# Patient Record
Sex: MALE | Race: WHITE | NOT HISPANIC OR LATINO | Employment: OTHER | URBAN - METROPOLITAN AREA
[De-identification: names, ages, dates, MRNs, and addresses within clinical notes are randomized per-mention and may not be internally consistent; named-entity substitution may affect disease eponyms.]

---

## 2019-06-15 ENCOUNTER — APPOINTMENT (EMERGENCY)
Dept: RADIOLOGY | Facility: HOSPITAL | Age: 74
End: 2019-06-15
Payer: OTHER GOVERNMENT

## 2019-06-15 ENCOUNTER — ANESTHESIA EVENT (OUTPATIENT)
Dept: SURGERY | Facility: HOSPITAL | Age: 74
End: 2019-06-15

## 2019-06-15 ENCOUNTER — HOSPITAL ENCOUNTER (EMERGENCY)
Dept: PERIOP | Facility: HOSPITAL | Age: 74
End: 2019-06-15
Attending: INTERNAL MEDICINE
Payer: OTHER GOVERNMENT

## 2019-06-15 ENCOUNTER — ANESTHESIA EVENT (EMERGENCY)
Dept: PERIOP | Facility: HOSPITAL | Age: 74
End: 2019-06-15
Payer: OTHER GOVERNMENT

## 2019-06-15 ENCOUNTER — HOSPITAL ENCOUNTER (EMERGENCY)
Facility: HOSPITAL | Age: 74
Discharge: HOME | End: 2019-06-15
Attending: EMERGENCY MEDICINE | Admitting: EMERGENCY MEDICINE
Payer: OTHER GOVERNMENT

## 2019-06-15 ENCOUNTER — ANESTHESIA (EMERGENCY)
Dept: PERIOP | Facility: HOSPITAL | Age: 74
End: 2019-06-15
Payer: OTHER GOVERNMENT

## 2019-06-15 ENCOUNTER — ANESTHESIA (OUTPATIENT)
Dept: SURGERY | Facility: HOSPITAL | Age: 74
End: 2019-06-15

## 2019-06-15 VITALS
SYSTOLIC BLOOD PRESSURE: 126 MMHG | HEART RATE: 80 BPM | TEMPERATURE: 98.2 F | DIASTOLIC BLOOD PRESSURE: 65 MMHG | OXYGEN SATURATION: 92 % | RESPIRATION RATE: 21 BRPM

## 2019-06-15 DIAGNOSIS — K22.2 ESOPHAGEAL OBSTRUCTION DUE TO FOOD IMPACTION: Primary | ICD-10-CM

## 2019-06-15 DIAGNOSIS — T18.128A FOOD IMPACTION OF ESOPHAGUS, INITIAL ENCOUNTER: ICD-10-CM

## 2019-06-15 DIAGNOSIS — T18.128A ESOPHAGEAL OBSTRUCTION DUE TO FOOD IMPACTION: Primary | ICD-10-CM

## 2019-06-15 LAB
ANION GAP SERPL CALCULATED.3IONS-SCNC: 7 MMOL/L (ref 4–13)
BASOPHILS # BLD AUTO: 0.03 THOUSANDS/ΜL (ref 0–0.1)
BASOPHILS NFR BLD AUTO: 0 % (ref 0–1)
BUN SERPL-MCNC: 20 MG/DL (ref 5–25)
CALCIUM SERPL-MCNC: 9.2 MG/DL (ref 8.3–10.1)
CHLORIDE SERPL-SCNC: 108 MMOL/L (ref 100–108)
CO2 SERPL-SCNC: 30 MMOL/L (ref 21–32)
CREAT SERPL-MCNC: 1.02 MG/DL (ref 0.6–1.3)
EOSINOPHIL # BLD AUTO: 0.38 THOUSAND/ΜL (ref 0–0.61)
EOSINOPHIL NFR BLD AUTO: 5 % (ref 0–6)
ERYTHROCYTE [DISTWIDTH] IN BLOOD BY AUTOMATED COUNT: 12.8 % (ref 11.6–15.1)
GFR SERPL CREATININE-BSD FRML MDRD: 71 ML/MIN/1.73SQ M
GLUCOSE SERPL-MCNC: 91 MG/DL (ref 65–140)
HCT VFR BLD AUTO: 48.2 % (ref 36.5–49.3)
HGB BLD-MCNC: 15.8 G/DL (ref 12–17)
IMM GRANULOCYTES # BLD AUTO: 0.02 THOUSAND/UL (ref 0–0.2)
IMM GRANULOCYTES NFR BLD AUTO: 0 % (ref 0–2)
LYMPHOCYTES # BLD AUTO: 2.76 THOUSANDS/ΜL (ref 0.6–4.47)
LYMPHOCYTES NFR BLD AUTO: 33 % (ref 14–44)
MCH RBC QN AUTO: 32.7 PG (ref 26.8–34.3)
MCHC RBC AUTO-ENTMCNC: 32.8 G/DL (ref 31.4–37.4)
MCV RBC AUTO: 100 FL (ref 82–98)
MONOCYTES # BLD AUTO: 0.65 THOUSAND/ΜL (ref 0.17–1.22)
MONOCYTES NFR BLD AUTO: 8 % (ref 4–12)
NEUTROPHILS # BLD AUTO: 4.43 THOUSANDS/ΜL (ref 1.85–7.62)
NEUTS SEG NFR BLD AUTO: 54 % (ref 43–75)
NRBC BLD AUTO-RTO: 0 /100 WBCS
PLATELET # BLD AUTO: 214 THOUSANDS/UL (ref 149–390)
PMV BLD AUTO: 10.2 FL (ref 8.9–12.7)
POTASSIUM SERPL-SCNC: 3.6 MMOL/L (ref 3.5–5.3)
RBC # BLD AUTO: 4.83 MILLION/UL (ref 3.88–5.62)
SODIUM SERPL-SCNC: 145 MMOL/L (ref 136–145)
WBC # BLD AUTO: 8.27 THOUSAND/UL (ref 4.31–10.16)

## 2019-06-15 PROCEDURE — 93005 ELECTROCARDIOGRAM TRACING: CPT

## 2019-06-15 PROCEDURE — C9113 INJ PANTOPRAZOLE SODIUM, VIA: HCPCS | Performed by: INTERNAL MEDICINE

## 2019-06-15 PROCEDURE — 71046 X-RAY EXAM CHEST 2 VIEWS: CPT

## 2019-06-15 PROCEDURE — 99285 EMERGENCY DEPT VISIT HI MDM: CPT

## 2019-06-15 PROCEDURE — 36415 COLL VENOUS BLD VENIPUNCTURE: CPT | Performed by: EMERGENCY MEDICINE

## 2019-06-15 PROCEDURE — 80048 BASIC METABOLIC PNL TOTAL CA: CPT | Performed by: EMERGENCY MEDICINE

## 2019-06-15 PROCEDURE — 96375 TX/PRO/DX INJ NEW DRUG ADDON: CPT

## 2019-06-15 PROCEDURE — 96361 HYDRATE IV INFUSION ADD-ON: CPT

## 2019-06-15 PROCEDURE — 99284 EMERGENCY DEPT VISIT MOD MDM: CPT | Performed by: EMERGENCY MEDICINE

## 2019-06-15 PROCEDURE — 85025 COMPLETE CBC W/AUTO DIFF WBC: CPT | Performed by: EMERGENCY MEDICINE

## 2019-06-15 PROCEDURE — 43235 EGD DIAGNOSTIC BRUSH WASH: CPT | Performed by: INTERNAL MEDICINE

## 2019-06-15 PROCEDURE — 93010 ELECTROCARDIOGRAM REPORT: CPT | Performed by: INTERNAL MEDICINE

## 2019-06-15 PROCEDURE — 96374 THER/PROPH/DIAG INJ IV PUSH: CPT

## 2019-06-15 PROCEDURE — 99243 OFF/OP CNSLTJ NEW/EST LOW 30: CPT | Performed by: INTERNAL MEDICINE

## 2019-06-15 RX ORDER — FENTANYL CITRATE/PF 50 MCG/ML
25 SYRINGE (ML) INJECTION
Status: DISCONTINUED | OUTPATIENT
Start: 2019-06-15 | End: 2019-06-15 | Stop reason: HOSPADM

## 2019-06-15 RX ORDER — PANTOPRAZOLE SODIUM 40 MG/1
40 INJECTION, POWDER, FOR SOLUTION INTRAVENOUS ONCE
Status: COMPLETED | OUTPATIENT
Start: 2019-06-15 | End: 2019-06-15

## 2019-06-15 RX ORDER — LABETALOL 20 MG/4 ML (5 MG/ML) INTRAVENOUS SYRINGE
5 AS NEEDED
Status: DISCONTINUED | OUTPATIENT
Start: 2019-06-15 | End: 2019-06-15 | Stop reason: HOSPADM

## 2019-06-15 RX ORDER — OMEPRAZOLE 20 MG/1
20 CAPSULE, DELAYED RELEASE ORAL DAILY
COMMUNITY

## 2019-06-15 RX ORDER — PROMETHAZINE HYDROCHLORIDE 25 MG/ML
6.25 INJECTION, SOLUTION INTRAMUSCULAR; INTRAVENOUS ONCE AS NEEDED
Status: DISCONTINUED | OUTPATIENT
Start: 2019-06-15 | End: 2019-06-15 | Stop reason: HOSPADM

## 2019-06-15 RX ORDER — METOCLOPRAMIDE HYDROCHLORIDE 5 MG/ML
10 INJECTION INTRAMUSCULAR; INTRAVENOUS ONCE AS NEEDED
Status: DISCONTINUED | OUTPATIENT
Start: 2019-06-15 | End: 2019-06-15 | Stop reason: HOSPADM

## 2019-06-15 RX ORDER — MEPERIDINE HYDROCHLORIDE 25 MG/ML
12.5 INJECTION INTRAMUSCULAR; INTRAVENOUS; SUBCUTANEOUS
Status: DISCONTINUED | OUTPATIENT
Start: 2019-06-15 | End: 2019-06-15 | Stop reason: HOSPADM

## 2019-06-15 RX ORDER — ONDANSETRON 2 MG/ML
INJECTION INTRAMUSCULAR; INTRAVENOUS AS NEEDED
Status: DISCONTINUED | OUTPATIENT
Start: 2019-06-15 | End: 2019-06-15 | Stop reason: SURG

## 2019-06-15 RX ORDER — HYDRALAZINE HYDROCHLORIDE 20 MG/ML
5 INJECTION INTRAMUSCULAR; INTRAVENOUS AS NEEDED
Status: DISCONTINUED | OUTPATIENT
Start: 2019-06-15 | End: 2019-06-15 | Stop reason: HOSPADM

## 2019-06-15 RX ORDER — ASPIRIN 81 MG/1
81 TABLET ORAL DAILY
COMMUNITY
End: 2021-02-11 | Stop reason: HOSPADM

## 2019-06-15 RX ORDER — SODIUM CHLORIDE, SODIUM LACTATE, POTASSIUM CHLORIDE, CALCIUM CHLORIDE 600; 310; 30; 20 MG/100ML; MG/100ML; MG/100ML; MG/100ML
INJECTION, SOLUTION INTRAVENOUS CONTINUOUS PRN
Status: DISCONTINUED | OUTPATIENT
Start: 2019-06-15 | End: 2019-06-15 | Stop reason: SURG

## 2019-06-15 RX ORDER — PROPOFOL 10 MG/ML
INJECTION, EMULSION INTRAVENOUS AS NEEDED
Status: DISCONTINUED | OUTPATIENT
Start: 2019-06-15 | End: 2019-06-15 | Stop reason: SURG

## 2019-06-15 RX ORDER — ESMOLOL HYDROCHLORIDE 10 MG/ML
INJECTION INTRAVENOUS AS NEEDED
Status: DISCONTINUED | OUTPATIENT
Start: 2019-06-15 | End: 2019-06-15 | Stop reason: SURG

## 2019-06-15 RX ORDER — FENTANYL CITRATE 50 UG/ML
INJECTION, SOLUTION INTRAMUSCULAR; INTRAVENOUS AS NEEDED
Status: DISCONTINUED | OUTPATIENT
Start: 2019-06-15 | End: 2019-06-15 | Stop reason: SURG

## 2019-06-15 RX ORDER — ROCURONIUM BROMIDE 10 MG/ML
INJECTION, SOLUTION INTRAVENOUS AS NEEDED
Status: DISCONTINUED | OUTPATIENT
Start: 2019-06-15 | End: 2019-06-15 | Stop reason: SURG

## 2019-06-15 RX ORDER — SUCCINYLCHOLINE/SOD CL,ISO/PF 100 MG/5ML
SYRINGE (ML) INTRAVENOUS AS NEEDED
Status: DISCONTINUED | OUTPATIENT
Start: 2019-06-15 | End: 2019-06-15 | Stop reason: SURG

## 2019-06-15 RX ORDER — ONDANSETRON 2 MG/ML
4 INJECTION INTRAMUSCULAR; INTRAVENOUS ONCE AS NEEDED
Status: DISCONTINUED | OUTPATIENT
Start: 2019-06-15 | End: 2019-06-15 | Stop reason: HOSPADM

## 2019-06-15 RX ADMIN — PROPOFOL 30 MG: 10 INJECTION, EMULSION INTRAVENOUS at 15:06

## 2019-06-15 RX ADMIN — PANTOPRAZOLE SODIUM 40 MG: 40 INJECTION, POWDER, FOR SOLUTION INTRAVENOUS at 13:34

## 2019-06-15 RX ADMIN — SODIUM CHLORIDE, SODIUM LACTATE, POTASSIUM CHLORIDE, AND CALCIUM CHLORIDE: .6; .31; .03; .02 INJECTION, SOLUTION INTRAVENOUS at 14:30

## 2019-06-15 RX ADMIN — ONDANSETRON 4 MG: 2 INJECTION INTRAMUSCULAR; INTRAVENOUS at 15:07

## 2019-06-15 RX ADMIN — ROCURONIUM BROMIDE 5 MG: 10 INJECTION, SOLUTION INTRAVENOUS at 14:52

## 2019-06-15 RX ADMIN — GLUCAGON HYDROCHLORIDE 2 MG: KIT at 08:42

## 2019-06-15 RX ADMIN — FENTANYL CITRATE 50 MCG: 50 INJECTION, SOLUTION INTRAMUSCULAR; INTRAVENOUS at 15:03

## 2019-06-15 RX ADMIN — ESMOLOL HYDROCHLORIDE 30 MG: 10 INJECTION, SOLUTION INTRAVENOUS at 14:57

## 2019-06-15 RX ADMIN — SODIUM CHLORIDE 1000 ML: 0.9 INJECTION, SOLUTION INTRAVENOUS at 08:42

## 2019-06-15 RX ADMIN — Medication 100 MG: at 14:57

## 2019-06-15 RX ADMIN — PROPOFOL 150 MG: 10 INJECTION, EMULSION INTRAVENOUS at 14:56

## 2019-06-17 LAB
ATRIAL RATE: 72 BPM
P AXIS: 62 DEGREES
PR INTERVAL: 172 MS
QRS AXIS: 71 DEGREES
QRSD INTERVAL: 150 MS
QT INTERVAL: 472 MS
QTC INTERVAL: 516 MS
T WAVE AXIS: 14 DEGREES
VENTRICULAR RATE: 72 BPM

## 2021-02-05 ENCOUNTER — APPOINTMENT (INPATIENT)
Dept: ULTRASOUND IMAGING | Facility: HOSPITAL | Age: 76
DRG: 854 | End: 2021-02-05
Payer: COMMERCIAL

## 2021-02-05 ENCOUNTER — APPOINTMENT (EMERGENCY)
Dept: CT IMAGING | Facility: HOSPITAL | Age: 76
DRG: 854 | End: 2021-02-05
Payer: COMMERCIAL

## 2021-02-05 ENCOUNTER — HOSPITAL ENCOUNTER (INPATIENT)
Facility: HOSPITAL | Age: 76
LOS: 6 days | Discharge: HOME | DRG: 854 | End: 2021-02-11
Attending: EMERGENCY MEDICINE | Admitting: INTERNAL MEDICINE
Payer: COMMERCIAL

## 2021-02-05 ENCOUNTER — APPOINTMENT (EMERGENCY)
Dept: RADIOLOGY | Facility: HOSPITAL | Age: 76
DRG: 854 | End: 2021-02-05
Payer: COMMERCIAL

## 2021-02-05 DIAGNOSIS — R10.9 ABDOMINAL PAIN: Primary | ICD-10-CM

## 2021-02-05 DIAGNOSIS — K82.9 GALLBLADDER DISEASE: ICD-10-CM

## 2021-02-05 DIAGNOSIS — N40.0 ENLARGED PROSTATE: ICD-10-CM

## 2021-02-05 DIAGNOSIS — R65.10 SIRS (SYSTEMIC INFLAMMATORY RESPONSE SYNDROME) (HCC): ICD-10-CM

## 2021-02-05 DIAGNOSIS — R10.11 RIGHT UPPER QUADRANT ABDOMINAL PAIN: ICD-10-CM

## 2021-02-05 DIAGNOSIS — I48.0 PAF (PAROXYSMAL ATRIAL FIBRILLATION) (HCC): ICD-10-CM

## 2021-02-05 DIAGNOSIS — R78.81 GRAM-NEGATIVE BACTEREMIA: ICD-10-CM

## 2021-02-05 DIAGNOSIS — I48.91 NEW ONSET ATRIAL FIBRILLATION (HCC): ICD-10-CM

## 2021-02-05 DIAGNOSIS — I10 HYPERTENSION, UNSPECIFIED TYPE: Chronic | ICD-10-CM

## 2021-02-05 DIAGNOSIS — R59.0 ABDOMINAL LYMPHADENOPATHY: ICD-10-CM

## 2021-02-05 LAB
ALBUMIN SERPL BCP-MCNC: 3.6 G/DL (ref 3.5–5)
ALP SERPL-CCNC: 74 U/L (ref 46–116)
ALT SERPL W P-5'-P-CCNC: 22 U/L (ref 12–78)
ANION GAP SERPL CALCULATED.3IONS-SCNC: 11 MMOL/L (ref 4–13)
APTT PPP: 36 SECONDS (ref 23–37)
APTT PPP: >210 SECONDS (ref 23–37)
AST SERPL W P-5'-P-CCNC: 19 U/L (ref 5–45)
ATRIAL RATE: 123 BPM
ATRIAL RATE: 144 BPM
ATRIAL RATE: 75 BPM
BACTERIA UR QL AUTO: NORMAL /HPF
BASOPHILS # BLD AUTO: 0.04 THOUSANDS/ΜL (ref 0–0.1)
BASOPHILS NFR BLD AUTO: 0 % (ref 0–1)
BILIRUB SERPL-MCNC: 2.9 MG/DL (ref 0.2–1)
BILIRUB UR QL STRIP: NEGATIVE
BUN SERPL-MCNC: 23 MG/DL (ref 5–25)
CALCIUM SERPL-MCNC: 8.9 MG/DL (ref 8.3–10.1)
CHLORIDE SERPL-SCNC: 96 MMOL/L (ref 100–108)
CLARITY UR: CLEAR
CO2 SERPL-SCNC: 25 MMOL/L (ref 21–32)
COLOR UR: YELLOW
CREAT SERPL-MCNC: 1.13 MG/DL (ref 0.6–1.3)
CRP SERPL QL: 229.8 MG/L
D DIMER PPP FEU-MCNC: 2.4 UG/ML FEU
EOSINOPHIL # BLD AUTO: 0.04 THOUSAND/ΜL (ref 0–0.61)
EOSINOPHIL NFR BLD AUTO: 0 % (ref 0–6)
ERYTHROCYTE [DISTWIDTH] IN BLOOD BY AUTOMATED COUNT: 13 % (ref 11.6–15.1)
FERRITIN SERPL-MCNC: 217 NG/ML (ref 8–388)
FLUAV RNA RESP QL NAA+PROBE: NEGATIVE
FLUBV RNA RESP QL NAA+PROBE: NEGATIVE
GFR SERPL CREATININE-BSD FRML MDRD: 62 ML/MIN/1.73SQ M
GLUCOSE SERPL-MCNC: 136 MG/DL (ref 65–140)
GLUCOSE UR STRIP-MCNC: NEGATIVE MG/DL
HCT VFR BLD AUTO: 49.2 % (ref 36.5–49.3)
HGB BLD-MCNC: 16.1 G/DL (ref 12–17)
HGB UR QL STRIP.AUTO: ABNORMAL
IMM GRANULOCYTES # BLD AUTO: 0.05 THOUSAND/UL (ref 0–0.2)
IMM GRANULOCYTES NFR BLD AUTO: 0 % (ref 0–2)
INR PPP: 1.19 (ref 0.84–1.19)
KETONES UR STRIP-MCNC: ABNORMAL MG/DL
LACTATE SERPL-SCNC: 1.6 MMOL/L (ref 0.5–2)
LEUKOCYTE ESTERASE UR QL STRIP: NEGATIVE
LYMPHOCYTES # BLD AUTO: 1.82 THOUSANDS/ΜL (ref 0.6–4.47)
LYMPHOCYTES NFR BLD AUTO: 13 % (ref 14–44)
MCH RBC QN AUTO: 32 PG (ref 26.8–34.3)
MCHC RBC AUTO-ENTMCNC: 32.7 G/DL (ref 31.4–37.4)
MCV RBC AUTO: 98 FL (ref 82–98)
MONOCYTES # BLD AUTO: 0.83 THOUSAND/ΜL (ref 0.17–1.22)
MONOCYTES NFR BLD AUTO: 6 % (ref 4–12)
NEUTROPHILS # BLD AUTO: 11.22 THOUSANDS/ΜL (ref 1.85–7.62)
NEUTS SEG NFR BLD AUTO: 81 % (ref 43–75)
NITRITE UR QL STRIP: NEGATIVE
NON-SQ EPI CELLS URNS QL MICRO: NORMAL /HPF
NRBC BLD AUTO-RTO: 0 /100 WBCS
NT-PROBNP SERPL-MCNC: 1311 PG/ML
P AXIS: 75 DEGREES
PH UR STRIP.AUTO: 5.5 [PH]
PLATELET # BLD AUTO: 186 THOUSANDS/UL (ref 149–390)
PMV BLD AUTO: 10.8 FL (ref 8.9–12.7)
POTASSIUM SERPL-SCNC: 3.7 MMOL/L (ref 3.5–5.3)
PR INTERVAL: 162 MS
PROCALCITONIN SERPL-MCNC: 2.33 NG/ML
PROT SERPL-MCNC: 7.8 G/DL (ref 6.4–8.2)
PROT UR STRIP-MCNC: NEGATIVE MG/DL
PROTHROMBIN TIME: 15.1 SECONDS (ref 11.6–14.5)
QRS AXIS: 116 DEGREES
QRS AXIS: 133 DEGREES
QRS AXIS: 136 DEGREES
QRSD INTERVAL: 144 MS
QRSD INTERVAL: 146 MS
QRSD INTERVAL: 148 MS
QT INTERVAL: 316 MS
QT INTERVAL: 336 MS
QT INTERVAL: 346 MS
QTC INTERVAL: 495 MS
QTC INTERVAL: 499 MS
QTC INTERVAL: 534 MS
RBC # BLD AUTO: 5.03 MILLION/UL (ref 3.88–5.62)
RBC #/AREA URNS AUTO: NORMAL /HPF
RSV RNA RESP QL NAA+PROBE: NEGATIVE
SARS-COV-2 RNA RESP QL NAA+PROBE: NEGATIVE
SODIUM SERPL-SCNC: 132 MMOL/L (ref 136–145)
SP GR UR STRIP.AUTO: <=1.005 (ref 1–1.03)
T WAVE AXIS: -10 DEGREES
T WAVE AXIS: -7 DEGREES
T WAVE AXIS: 24 DEGREES
TROPONIN I SERPL-MCNC: <0.02 NG/ML
TSH SERPL DL<=0.05 MIU/L-ACNC: 2.99 UIU/ML (ref 0.36–3.74)
UROBILINOGEN UR QL STRIP.AUTO: 0.2 E.U./DL
VENTRICULAR RATE: 123 BPM
VENTRICULAR RATE: 150 BPM
VENTRICULAR RATE: 152 BPM
WBC # BLD AUTO: 14 THOUSAND/UL (ref 4.31–10.16)
WBC #/AREA URNS AUTO: NORMAL /HPF

## 2021-02-05 PROCEDURE — 85025 COMPLETE CBC W/AUTO DIFF WBC: CPT | Performed by: PHYSICIAN ASSISTANT

## 2021-02-05 PROCEDURE — 76705 ECHO EXAM OF ABDOMEN: CPT

## 2021-02-05 PROCEDURE — 99223 1ST HOSP IP/OBS HIGH 75: CPT | Performed by: INTERNAL MEDICINE

## 2021-02-05 PROCEDURE — 84443 ASSAY THYROID STIM HORMONE: CPT | Performed by: NURSE PRACTITIONER

## 2021-02-05 PROCEDURE — 85379 FIBRIN DEGRADATION QUANT: CPT | Performed by: PHYSICIAN ASSISTANT

## 2021-02-05 PROCEDURE — 87077 CULTURE AEROBIC IDENTIFY: CPT | Performed by: PHYSICIAN ASSISTANT

## 2021-02-05 PROCEDURE — 94760 N-INVAS EAR/PLS OXIMETRY 1: CPT

## 2021-02-05 PROCEDURE — 83605 ASSAY OF LACTIC ACID: CPT | Performed by: PHYSICIAN ASSISTANT

## 2021-02-05 PROCEDURE — 71045 X-RAY EXAM CHEST 1 VIEW: CPT

## 2021-02-05 PROCEDURE — 0241U HB NFCT DS VIR RESP RNA 4 TRGT: CPT | Performed by: PHYSICIAN ASSISTANT

## 2021-02-05 PROCEDURE — 94664 DEMO&/EVAL PT USE INHALER: CPT

## 2021-02-05 PROCEDURE — 99285 EMERGENCY DEPT VISIT HI MDM: CPT | Performed by: PHYSICIAN ASSISTANT

## 2021-02-05 PROCEDURE — 85610 PROTHROMBIN TIME: CPT | Performed by: PHYSICIAN ASSISTANT

## 2021-02-05 PROCEDURE — 96361 HYDRATE IV INFUSION ADD-ON: CPT

## 2021-02-05 PROCEDURE — 87186 SC STD MICRODIL/AGAR DIL: CPT | Performed by: PHYSICIAN ASSISTANT

## 2021-02-05 PROCEDURE — 83880 ASSAY OF NATRIURETIC PEPTIDE: CPT | Performed by: PHYSICIAN ASSISTANT

## 2021-02-05 PROCEDURE — 84145 PROCALCITONIN (PCT): CPT | Performed by: PHYSICIAN ASSISTANT

## 2021-02-05 PROCEDURE — 96365 THER/PROPH/DIAG IV INF INIT: CPT

## 2021-02-05 PROCEDURE — 74177 CT ABD & PELVIS W/CONTRAST: CPT

## 2021-02-05 PROCEDURE — 93010 ELECTROCARDIOGRAM REPORT: CPT | Performed by: INTERNAL MEDICINE

## 2021-02-05 PROCEDURE — 81001 URINALYSIS AUTO W/SCOPE: CPT | Performed by: NURSE PRACTITIONER

## 2021-02-05 PROCEDURE — 99223 1ST HOSP IP/OBS HIGH 75: CPT | Performed by: NURSE PRACTITIONER

## 2021-02-05 PROCEDURE — 94640 AIRWAY INHALATION TREATMENT: CPT

## 2021-02-05 PROCEDURE — 87040 BLOOD CULTURE FOR BACTERIA: CPT | Performed by: PHYSICIAN ASSISTANT

## 2021-02-05 PROCEDURE — G1004 CDSM NDSC: HCPCS

## 2021-02-05 PROCEDURE — 36415 COLL VENOUS BLD VENIPUNCTURE: CPT | Performed by: PHYSICIAN ASSISTANT

## 2021-02-05 PROCEDURE — 85730 THROMBOPLASTIN TIME PARTIAL: CPT | Performed by: INTERNAL MEDICINE

## 2021-02-05 PROCEDURE — 85730 THROMBOPLASTIN TIME PARTIAL: CPT | Performed by: PHYSICIAN ASSISTANT

## 2021-02-05 PROCEDURE — 82728 ASSAY OF FERRITIN: CPT | Performed by: PHYSICIAN ASSISTANT

## 2021-02-05 PROCEDURE — 96375 TX/PRO/DX INJ NEW DRUG ADDON: CPT

## 2021-02-05 PROCEDURE — 99285 EMERGENCY DEPT VISIT HI MDM: CPT

## 2021-02-05 PROCEDURE — 86140 C-REACTIVE PROTEIN: CPT | Performed by: PHYSICIAN ASSISTANT

## 2021-02-05 PROCEDURE — 93005 ELECTROCARDIOGRAM TRACING: CPT

## 2021-02-05 PROCEDURE — 84484 ASSAY OF TROPONIN QUANT: CPT | Performed by: PHYSICIAN ASSISTANT

## 2021-02-05 PROCEDURE — 80053 COMPREHEN METABOLIC PANEL: CPT | Performed by: PHYSICIAN ASSISTANT

## 2021-02-05 RX ORDER — DILTIAZEM HYDROCHLORIDE 5 MG/ML
15 INJECTION INTRAVENOUS ONCE
Status: COMPLETED | OUTPATIENT
Start: 2021-02-05 | End: 2021-02-05

## 2021-02-05 RX ORDER — ACETAMINOPHEN 325 MG/1
650 TABLET ORAL ONCE
Status: COMPLETED | OUTPATIENT
Start: 2021-02-05 | End: 2021-02-05

## 2021-02-05 RX ORDER — IPRATROPIUM BROMIDE AND ALBUTEROL SULFATE 2.5; .5 MG/3ML; MG/3ML
3 SOLUTION RESPIRATORY (INHALATION) EVERY 6 HOURS PRN
Status: DISCONTINUED | OUTPATIENT
Start: 2021-02-05 | End: 2021-02-05

## 2021-02-05 RX ORDER — HEPARIN SODIUM 1000 [USP'U]/ML
3600 INJECTION, SOLUTION INTRAVENOUS; SUBCUTANEOUS
Status: ACTIVE | OUTPATIENT
Start: 2021-02-05 | End: 2021-02-07

## 2021-02-05 RX ORDER — SODIUM CHLORIDE FOR INHALATION 0.9 %
3 VIAL, NEBULIZER (ML) INHALATION EVERY 6 HOURS PRN
Status: DISCONTINUED | OUTPATIENT
Start: 2021-02-05 | End: 2021-02-11 | Stop reason: HOSPADM

## 2021-02-05 RX ORDER — METOPROLOL TARTRATE 5 MG/5ML
5 INJECTION INTRAVENOUS EVERY 6 HOURS PRN
Status: DISCONTINUED | OUTPATIENT
Start: 2021-02-05 | End: 2021-02-11 | Stop reason: HOSPADM

## 2021-02-05 RX ORDER — HEPARIN SODIUM 10000 [USP'U]/100ML
18 INJECTION, SOLUTION INTRAVENOUS
Status: DISPENSED | OUTPATIENT
Start: 2021-02-05 | End: 2021-02-07

## 2021-02-05 RX ORDER — HEPARIN SODIUM 1000 [USP'U]/ML
7200 INJECTION, SOLUTION INTRAVENOUS; SUBCUTANEOUS
Status: ACTIVE | OUTPATIENT
Start: 2021-02-05 | End: 2021-02-07

## 2021-02-05 RX ORDER — HEPARIN SODIUM 1000 [USP'U]/ML
7200 INJECTION, SOLUTION INTRAVENOUS; SUBCUTANEOUS ONCE
Status: COMPLETED | OUTPATIENT
Start: 2021-02-05 | End: 2021-02-05

## 2021-02-05 RX ORDER — ACETAMINOPHEN 325 MG/1
650 TABLET ORAL EVERY 6 HOURS PRN
Status: DISCONTINUED | OUTPATIENT
Start: 2021-02-05 | End: 2021-02-11 | Stop reason: HOSPADM

## 2021-02-05 RX ORDER — METOPROLOL TARTRATE 5 MG/5ML
5 INJECTION INTRAVENOUS ONCE
Status: COMPLETED | OUTPATIENT
Start: 2021-02-05 | End: 2021-02-05

## 2021-02-05 RX ORDER — LEVALBUTEROL 1.25 MG/.5ML
1.25 SOLUTION, CONCENTRATE RESPIRATORY (INHALATION) EVERY 6 HOURS PRN
Status: DISCONTINUED | OUTPATIENT
Start: 2021-02-05 | End: 2021-02-11 | Stop reason: HOSPADM

## 2021-02-05 RX ORDER — SODIUM CHLORIDE, SODIUM GLUCONATE, SODIUM ACETATE, POTASSIUM CHLORIDE, MAGNESIUM CHLORIDE, SODIUM PHOSPHATE, DIBASIC, AND POTASSIUM PHOSPHATE .53; .5; .37; .037; .03; .012; .00082 G/100ML; G/100ML; G/100ML; G/100ML; G/100ML; G/100ML; G/100ML
500 INJECTION, SOLUTION INTRAVENOUS ONCE
Status: COMPLETED | OUTPATIENT
Start: 2021-02-05 | End: 2021-02-05

## 2021-02-05 RX ORDER — MAGNESIUM SULFATE HEPTAHYDRATE 40 MG/ML
2 INJECTION, SOLUTION INTRAVENOUS ONCE
Status: COMPLETED | OUTPATIENT
Start: 2021-02-05 | End: 2021-02-05

## 2021-02-05 RX ADMIN — PIPERACILLIN AND TAZOBACTAM 3.38 G: 3; .375 INJECTION, POWDER, LYOPHILIZED, FOR SOLUTION INTRAVENOUS at 20:47

## 2021-02-05 RX ADMIN — SODIUM CHLORIDE 1000 ML: 0.9 INJECTION, SOLUTION INTRAVENOUS at 14:27

## 2021-02-05 RX ADMIN — HEPARIN SODIUM 18 UNITS/KG/HR: 10000 INJECTION, SOLUTION INTRAVENOUS at 16:26

## 2021-02-05 RX ADMIN — DILTIAZEM HYDROCHLORIDE 15 MG: 5 INJECTION INTRAVENOUS at 14:19

## 2021-02-05 RX ADMIN — IOHEXOL 100 ML: 350 INJECTION, SOLUTION INTRAVENOUS at 13:54

## 2021-02-05 RX ADMIN — SODIUM CHLORIDE 500 ML: 0.9 INJECTION, SOLUTION INTRAVENOUS at 13:02

## 2021-02-05 RX ADMIN — LEVALBUTEROL HYDROCHLORIDE 1.25 MG: 1.25 SOLUTION, CONCENTRATE RESPIRATORY (INHALATION) at 17:27

## 2021-02-05 RX ADMIN — METOROPROLOL TARTRATE 5 MG: 5 INJECTION, SOLUTION INTRAVENOUS at 17:28

## 2021-02-05 RX ADMIN — SODIUM CHLORIDE, SODIUM GLUCONATE, SODIUM ACETATE, POTASSIUM CHLORIDE, MAGNESIUM CHLORIDE, SODIUM PHOSPHATE, DIBASIC, AND POTASSIUM PHOSPHATE 500 ML: .53; .5; .37; .037; .03; .012; .00082 INJECTION, SOLUTION INTRAVENOUS at 21:29

## 2021-02-05 RX ADMIN — ISODIUM CHLORIDE 3 ML: 0.03 SOLUTION RESPIRATORY (INHALATION) at 17:27

## 2021-02-05 RX ADMIN — DILTIAZEM HYDROCHLORIDE 15 MG/HR: 5 INJECTION INTRAVENOUS at 18:38

## 2021-02-05 RX ADMIN — HEPARIN SODIUM 7200 UNITS: 1000 INJECTION INTRAVENOUS; SUBCUTANEOUS at 16:31

## 2021-02-05 RX ADMIN — DILTIAZEM HYDROCHLORIDE 5 MG/HR: 5 INJECTION INTRAVENOUS at 15:15

## 2021-02-05 RX ADMIN — DILTIAZEM HYDROCHLORIDE 15 MG: 5 INJECTION INTRAVENOUS at 16:14

## 2021-02-05 RX ADMIN — ACETAMINOPHEN 650 MG: 325 TABLET, FILM COATED ORAL at 17:20

## 2021-02-05 RX ADMIN — ACETAMINOPHEN 650 MG: 325 TABLET, FILM COATED ORAL at 12:24

## 2021-02-05 RX ADMIN — PIPERACILLIN AND TAZOBACTAM 3.38 G: 3; .375 INJECTION, POWDER, LYOPHILIZED, FOR SOLUTION INTRAVENOUS at 14:32

## 2021-02-05 RX ADMIN — MAGNESIUM SULFATE HEPTAHYDRATE 2 G: 40 INJECTION, SOLUTION INTRAVENOUS at 16:12

## 2021-02-05 NOTE — UTILIZATION REVIEW
Notification of Inpatient Admission/Inpatient Authorization Request   This is a Notification of Inpatient Admission for [address]  Be advised that this patient was admitted to our facility under Inpatient Status  Contact Samm Hernandez at [phone] for additional admission information  [facility] DEPT DEDICATED [place] [phone]  Patient Name:   Kristie Hayden   YOB: 1945       [address]   [address]:   [address]   Tax ID: [id number]  NPI: [id number] Attending Provider/NPI:  Phone:  Address: Aurea Gant [[id number]]  [phone]  Same as SHAYY/Karen Mehta 1106 of Service Code: 24     Place of Service Name:  [address] [place] [address]   Start Date: 2/5/21 1504 Discharge Date & Time: No discharge date for patient encounter  Type of Admission: Inpatient Status Discharge Disposition   (if discharged): Home/Self Care   Patient Diagnoses: SOB (shortness of breath) [R06 02]     Orders: Admission Orders (From admission, onward)     Ordered        02/05/21 1504  Inpatient Admission  Once                    Assigned Utilization Review Contact: Samm Hernandez  Utilization   Network Utilization Review Department  Phone: [phone]; Fax [phone]  Email: Brandon Krabbe [email or web address]   ATTENTION PAYERS: Please call the assigned Utilization  directly with any questions or concerns ALL voicemails in the department are confidential  Send all requests for admission clinical reviews, approved or denied determinations and any other requests to dedicated fax number belonging to the campus where the patient is receiving treatment

## 2021-02-05 NOTE — CONSULTS
Consultation - Cardiology Team One  Tommy John 66 y o  male MRN: [id number]  Unit/Bed#: ED 02 Encounter: [id number]    Consults    Physician Requesting Consult: Colt Frye MD  Reason for Consult / Principal Problem: afib with RVR    Assessment:    Afib with RVR  · Patient comes to ED with c/o upper abdominal/epigastric pain and chills  · EKG on admit by my review appears to be sinus tachycardia with frequent PVC's  · Around 1356 patient noted to go into rapid afib with rates in the 160's  · Patient is asymptomatic and denies any palpitations/SOB/chest discomfort  · IV Cardizem 15 mg bolus x 1 given in ED with minimal decrease in rate  · IV Cardizem drip started at 5mg/hr with no response, now up-titrated to 7 5 mg/hr  · Will give additional IV Cardizem 15 mg bolus now and continue to up-titrate Cardizem drip with goal rate 100 or less at rest  · Presumed new diagnosis for patient; patient is unaware of his past medical history  · K+ 3 7, mag and TSH not drawn; will order  · Will given 2 gm IV mag now   · [id number] score of 4 (based on what we know at this time); will start heparin drip for anticoagulation given findings of possible cholecystitis on CT of abdomen  · If no surgical intervention is needed then can start Eliquis 5 mg BID for [address] and d/c heparin drip at that time  ·  at this time would attempt to continue IV Cardizem drip for rate control and likely start PO CCB tomorrow based on response to IV drip  · Patient meets SIRS criteria on admit with evidence of distended gallbladder; consider possible infectious vs  Inflammatory etiology driving afib with RVR    SIRS  · Meets SIRS criteria on admit with fever 100 6, sinus tachycardia and leukocytosis  · CT scan showed distended gallbladder - right upper quadrant ultrasound pending  · Started on Zosyn in the ED  · Blood cultures pending  · Management per primary team  · mireyasathya driving afib with RVR    Hx of CAD  ·  patient states he had stents placed in 2018 in [place]  ·  he does not remember what vessel/s  ·  patient is unaware of past medical history; poor historian  ·  he states he takes 81 mg of aspirin daily  ·  he states he follows with a cardiologist within the [facility]    Essential HTN  ·  currently hemodynamically stable  ·  patient thinks that he takes metoprolol tartrate at home but he is unsure of dose  ·  would hold off on all antihypertensive medications at this time and continue IV Cardizem drip for rate control of Afib    Right upper quadrant pain  · Patient notes RUQ pain that began Wednesday   · Also notes fever/chills and fatigue today  · CT scan showed distended gallbladder - right upper quadrant ultrasound pending        Plan/Recommendations:  · will give additional IV Cardizem bolus 15 mg now  ·  continue IV Cardizem drip and up titrate as needed for rate control  ·  would begin heparin drip for anticoagulation at this time; once it is determined that surgical intervention is not necessary for gallbladder then can start Eliquis 5 mg b i d  for [address] and would DC heparin drip at that time  ·  will order echo        __________________________________________________________________________      History of Present Illness   HPI: Mitchell Crump is a 66y o  year old male with a PMH of CAD and HTN  The patient is a poor historian largely unaware of his past medical history and states that he usually sees the [facility] of which we do not have records  Patient states that he began to notice upper abdominal/epigastric pain on Wednesday afternoon into the evening states he took Tylenol without relief  He denies any type of nausea or vomiting at that time but states that he was fatigued  He states that he went to sleep woke up yesterday overall feeling pretty well yesterday  He states that he was able to help his significant other clean the house today and visit with her daughter    However, the patient states that when his significant other's daughter left today in the morning he began to feel extreme chills noted a fever excessive fatigue and continued abdominal pain  The patient states this prompted him coming to the ED for evaluation  At time of admission to the ED the patient was noted to be in sinus tachycardia with frequent PVCs  He was also normotensive  The patient tells me that the only cardiac history he is aware of is that he had stents placed somewhere in [place] in 2018  He states that he follows with a cardiologist within the [facility] in [place] but he does not know the doctor's name and states that he saw him early last year " and I was told everything was fine"  The patient states that he knows he takes high blood pressure medication and he takes 181 mg aspirin daily  The patient is unable to tell me if he has any other cardiac history other than stent placement  Cardiology is asked to evaluate the patient secondary to the fact that around [phone] the patient was noted to go in to Afib with RVR with rates in the 160's  IV Cardizem bolus 15 mg given once in the ED and drip started  The patient is largely asymptomatic stating that he has no complaints of shortness of breath at rest, palpitations, chest discomfort or orthopnea  Please see plan/ recommendation as listed above  EKG reviewed personally: afib with RVR        Telemetry reviewed personally: afib with RVR rates in the 160's      Review of Systems   Constitution: Positive for chills, fever and malaise/fatigue  HENT: Negative for congestion  Cardiovascular: Negative for chest pain, dyspnea on exertion, leg swelling, orthopnea and palpitations  Respiratory: Negative for cough, shortness of breath (no SOB at rest) and wheezing  Endocrine: Negative  Hematologic/Lymphatic: Negative  Skin: Negative  Musculoskeletal: Negative  Gastrointestinal: Positive for bloating and abdominal pain   Negative for nausea and vomiting  Genitourinary: Negative  Neurological: Negative for dizziness and light-headedness  Psychiatric/Behavioral: Negative  All other systems reviewed and are negative  Historical Information   Past Medical History:   Diagnosis Date    Dysphagia     Hypertension      Past Surgical History:   Procedure Laterality Date    CORONARY STENT PLACEMENT       Social History     Substance and Sexual Activity   Alcohol Use Yes    Comment: occasionally     Social History     Substance and Sexual Activity   Drug Use Never     Social History     Tobacco Use   Smoking Status Never Smoker   Smokeless Tobacco Never Used     Family History: History reviewed  No pertinent family history  Meds/Allergies   current meds:   Current Facility-Administered Medications   Medication Dose Route Frequency    diltiazem (CARDIZEM) injection 15 mg  15 mg Intravenous Once    enoxaparin (LOVENOX) subcutaneous injection 30 mg  30 mg Subcutaneous Daily    magnesium sulfate 2 g/50 mL IVPB (premix) 2 g  2 g Intravenous Once    piperacillin-tazobactam (ZOSYN) 3 375 g in sodium chloride 0 9 % 100 mL IVPB  3 375 g Intravenous Q6H     No current facility-administered medications for this encounter  Allergies   Allergen Reactions    Pollen Extract        Objective   Vitals: Blood pressure 139/65, pulse (!) 156, temperature (!) 100 6 °F (38 1 °C), temperature source Tympanic, resp  rate 22, height 5' 8" (1 727 m), weight 83 9 kg (185 lb), SpO2 93 %  ,     Body mass index is 28 13 kg/m²  ,     Systolic (95AVG), KQN:814 , Min:107 , RRP:436     Diastolic (66FYL), YQE:06, Min:65, Max:92    Wt Readings from Last 3 Encounters:   02/05/21 83 9 kg (185 lb)      Lab Results   Component Value Date    CREATININE 1 13 02/05/2021    CREATININE 1 02 06/15/2019             Intake/Output Summary (Last 24 hours) at 2/5/2021 1539  Last data filed at 2/5/2021 1527  Gross per 24 hour   Intake 1550 ml   Output --   Net 1550 ml Weight (last 2 days)     Date/Time   Weight    02/05/21 1122   83 9 (185)            Invasive Devices     Peripheral Intravenous Line            Peripheral IV 02/05/21 Left Antecubital less than 1 day    Peripheral IV 02/05/21 Right Antecubital less than 1 day                  Physical Exam  Vitals signs reviewed  Constitutional:       General: He is not in acute distress  Appearance: He is obese  HENT:      Head: Normocephalic and atraumatic  Mouth/Throat:      Mouth: Mucous membranes are dry  Neck:      Musculoskeletal: Normal range of motion and neck supple  Cardiovascular:      Rate and Rhythm: Tachycardia present  Rhythm irregularly irregular  Heart sounds: Normal heart sounds, S1 normal and S2 normal  No murmur  Pulmonary:      Effort: Pulmonary effort is normal  No respiratory distress  Breath sounds: Examination of the right-lower field reveals decreased breath sounds  Examination of the left-lower field reveals decreased breath sounds  Decreased breath sounds present  Abdominal:      General: Bowel sounds are normal  There is no distension  Palpations: Abdomen is soft  Musculoskeletal: Normal range of motion  Right lower leg: No edema  Left lower leg: No edema  Skin:     General: Skin is warm and dry  Neurological:      Mental Status: He is alert and oriented to person, place, and time     Psychiatric:         Mood and Affect: Mood normal            LABORATORY RESULTS:  Results from last 7 days   Lab Units 02/05/21  1223   TROPONIN I ng/mL <0 02     CBC with diff:   Results from last 7 days   Lab Units 02/05/21  1223   WBC Thousand/uL 14 00*   HEMOGLOBIN g/dL 16 1   HEMATOCRIT % 49 2   MCV fL 98   PLATELETS Thousands/uL 186   MCH pg 32 0   MCHC g/dL 32 7   RDW % 13 0   MPV fL 10 8   NRBC AUTO /100 WBCs 0       CMP:  Results from last 7 days   Lab Units 02/05/21  1223   POTASSIUM mmol/L 3 7   CHLORIDE mmol/L 96*   CO2 mmol/L 25   BUN mg/dL 23 CREATININE mg/dL 1 13   CALCIUM mg/dL 8 9   AST U/L 19   ALT U/L 22   ALK PHOS U/L 74   EGFR ml/min/1 73sq m 62       BMP:  Results from last 7 days   Lab Units 02/05/21  1223   POTASSIUM mmol/L 3 7   CHLORIDE mmol/L 96*   CO2 mmol/L 25   BUN mg/dL 23   CREATININE mg/dL 1 13   CALCIUM mg/dL 8 9          Lab Results   Component Value Date    NTBNP 1,311 (H) 02/05/2021                              Results from last 7 days   Lab Units 02/05/21  1223   INR  1 19     Lipid Profile:   No results found for: CHOL  No results found for: HDL  No results found for: LDLCALC  No results found for: TRIG      Cardiac testing:   No results found for this or any previous visit  No results found for this or any previous visit  No procedure found  No results found for this or any previous visit  Imaging: I have personally reviewed pertinent reports  Xr Chest Portable    Result Date: 2/5/2021  Narrative: CHEST INDICATION:   fatigue  Chills  Fever  Patient has suspected COVID-19  COMPARISON:  6/15/2019 EXAM PERFORMED/VIEWS:  XR CHEST PORTABLE FINDINGS: Cardiomediastinal silhouette appears unremarkable  There is minor bibasilar scarring unchanged from prior examination without evidence of acute infiltrate  No pulmonary edema, pneumothorax or pleural effusion Osseous structures appear within normal limits for patient age  Impression: No acute cardiopulmonary disease  Workstation performed: [id number]     Ct Abdomen Pelvis With Contrast    Result Date: 2/5/2021  Narrative: CT ABDOMEN AND PELVIS WITH IV CONTRAST INDICATION:   Abdominal pain, acute, nonlocalized right sided abdominal pain, hematuria  COMPARISON:  None TECHNIQUE:  CT examination of the abdomen and pelvis was performed  Axial, sagittal, and coronal 2D reformatted images were created from the source data and submitted for interpretation  Radiation dose length product (DLP) for this visit:  464 63 mGy-cm    This examination, like all CT scans performed in the [facility], was performed utilizing techniques to minimize radiation dose exposure, including the use of iterative reconstruction and automated exposure control  IV Contrast:  100 mL of iohexol (OMNIPAQUE) Enteric Contrast:  Enteric contrast was not administered  FINDINGS: ABDOMEN LOWER CHEST:  Mild basilar emphysema  LIVER/BILIARY TREE:  Subcentimeter sharply circumscribed caudate lobe hypodensity is too small to accurately characterize, but statistically most likely to represent subcentimeter cyst   No suspicious solid hepatic lesion is identified  Liver is mildly enlarged  Hepatic contours are normal   No biliary dilatation  GALLBLADDER: Gallbladder is distended with possible trace pericholecystic fluid  No calcified gallstones  No pericholecystic inflammatory change  SPLEEN:  Unremarkable  PANCREAS:  Moderate atrophy without acute findings  ADRENAL GLANDS:  Unremarkable  KIDNEYS/URETERS:  The right kidney is absent  The left kidney is unremarkable  Mild left perinephric edema, nonspecific  STOMACH AND BOWEL:  The stomach is grossly unremarkable accounting for degree of distention  The small bowel is normal caliber  Small duodenal diverticulum along the C-loop  Colonic diverticulosis without evidence of diverticulitis  APPENDIX:  A normal appendix is visualized  ABDOMINOPELVIC CAVITY:  No ascites  No pneumoperitoneum  1 1 x 2 3 cm portacaval lymph node series 2/27  8 mm right periaortic lymph node image 31  Shotty right lower quadrant mesenteric lymph nodes  Subcentimeter bilateral pelvic sidewall lymph nodes  VESSELS:  Unremarkable for patient's age  PELVIS REPRODUCTIVE ORGANS:  Mild prostatomegaly  URINARY BLADDER:  Unremarkable  ABDOMINAL WALL/INGUINAL REGIONS:  Small fat-containing supraumbilical ventral abdominal wall hernia, neck of the defect measures about 1 7 cm in transverse diameter  This is seen just above the level of a tiny fat-containing umbilical hernia   OSSEOUS STRUCTURES:  No acute fracture or destructive osseous lesion  Lumbar dextroscoliosis with multilevel degenerative changes  Impression: 1  Distended gallbladder with possible trace pericholecystic fluid but no gallstones  Given history, correlation with ultrasound recommended regarding possible cholecystitis  2  Colonic diverticulosis without evidence of diverticulitis  3  Solitary left kidney  4  Mild prostatomegaly  5  Small fat-containing supraumbilical ventral abdominal wall hernia  Additional incidental findings as above  Workstation performed: [id number]           Counseling / Coordination of Care  Total floor / unit time spent today 45 minutes  Greater than 50% of total time was spent with the patient and / or family counseling and / or coordination of care  A description of the counseling / coordination of care: Review of history, current assessment, development of a plan  Code Status: No Order    ** Please Note: Dragon 360 Dictation voice to text software may have been used in the creation of this document   **

## 2021-02-05 NOTE — RESPIRATORY THERAPY NOTE
RT Protocol Note  Jenny Frias 66 y o  male MRN: [id number]  Unit/Bed#: -01 Encounter: [id number]    Assessment    Principal Problem:    Right upper quadrant abdominal pain  Active Problems:    PAF (paroxysmal atrial fibrillation) (HCC)    SIRS (systemic inflammatory response syndrome) (HCC)    Hypertension      Home Pulmonary Medications:  none       Past Medical History:   Diagnosis Date    Dysphagia     Hypertension      Social History     Socioeconomic History    Marital status: Single     Spouse name: None    Number of children: None    Years of education: None    Highest education level: None   Occupational History    None   Social Needs    Financial resource strain: None    Food insecurity     Worry: None     Inability: None    Transportation needs     Medical: None     Non-medical: None   Tobacco Use    Smoking status: Never Smoker    Smokeless tobacco: Never Used   Substance and Sexual Activity    Alcohol use: Yes     Comment: occasionally    Drug use: Never    Sexual activity: Not Currently   Lifestyle    Physical activity     Days per week: None     Minutes per session: None    Stress: None   Relationships    Social connections     Talks on phone: None     Gets together: None     Attends Denominational service: None     Active member of club or organization: None     Attends meetings of clubs or organizations: None     Relationship status: None    Intimate partner violence     Fear of current or ex partner: None     Emotionally abused: None     Physically abused: None     Forced sexual activity: None   Other Topics Concern    None   Social History Narrative    None       Subjective         Objective    Physical Exam:   Assessment Type: Pre-treatment  General Appearance: Awake, Alert  Respiratory Pattern: Tachypneic, Labored  Chest Assessment: Chest expansion symmetrical  Bilateral Breath Sounds: Diminished  Cough: None  O2 Device: NC    Vitals:  Blood pressure 121/58, pulse (!) 146, temperature (!) 100 6 °F (38 1 °C), temperature source Tympanic, resp  rate 20, height 5' 8" (1 727 m), weight 92 kg (202 lb 13 2 oz), SpO2 98 %            Imaging and other studies: I have personally reviewed pertinent films in PACS    O2 Device: NC     Plan    Respiratory Plan: Mild Distress pathway        Resp Comments: NP ordered duoneb q6 prn/changed to xopenex q6 prn as per protocol/based on pt increased HR

## 2021-02-05 NOTE — ASSESSMENT & PLAN NOTE
· Presented with new onset rapid AFib with heart rates 160s to 170s  · Received Cardizem bolus and drip in the ER  · Patient takes Lopressor home - consider IV Lopressor  · Cardiology consult pending

## 2021-02-05 NOTE — ASSESSMENT & PLAN NOTE
· Patient presented with fever 100 6, tachycardia 148, WBC 14  · Suspected source gallbladder  · Received Zosyn in the ER - continue for now  · Procalcitonin, blood cultures pending  · CT scan showed distended gallbladder - right upper quadrant ultrasound pending

## 2021-02-05 NOTE — ED PROVIDER NOTES
History  Chief Complaint   Patient presents with    Fatigue     patient presents to the ED with co fatigue, chills, fevers, wife COVID + 2 weeks ago and patient just got his first vaccine on 1/22      Patient is a 67 y/o M with h/o HTN that presents to the ED with fevers, chills, fatigue, abdominal pain and hematuria that started 2 days ago  He states it all started with right sided abdominal pain after eating a pie  He states today he started with shaking chills and noticed a moderate amount of blood in his urine  He states his abdominal pain has improved  He states his wife tested positive for covid 2 weeks ago  He has chronic SOB and cough due to Emphysema  He denies chest pain or leg pain or swelling  History provided by:  Patient  Fatigue  Severity:  Moderate  Onset quality:  Sudden  Duration:  1 day  Timing:  Constant  Progression:  Unchanged  Chronicity:  New  Relieved by:  Nothing  Worsened by:  Nothing  Ineffective treatments:  None tried  Associated symptoms: abdominal pain, cough, fever and shortness of breath    Associated symptoms: no arthralgias, no ataxia, no chest pain, no diarrhea, no dizziness, no drooling, no dysuria, no falls, no frequency, no headaches, no nausea, no stroke symptoms, no vision change and no vomiting    Risk factors: no congestive heart failure and no coronary artery disease        Prior to Admission Medications   Prescriptions Last Dose Informant Patient Reported? Taking? METOPROLOL SUCCINATE PO   Yes No   Sig: Take by mouth   aspirin (ECOTRIN LOW STRENGTH) 81 mg EC tablet   Yes No   Sig: Take 81 mg by mouth daily   omeprazole (PriLOSEC) 20 mg delayed release capsule   Yes No   Sig: Take 20 mg by mouth daily      Facility-Administered Medications: None       Past Medical History:   Diagnosis Date    Dysphagia     Hypertension        Past Surgical History:   Procedure Laterality Date    CORONARY STENT PLACEMENT         History reviewed   No pertinent family history  I have reviewed and agree with the history as documented  E-Cigarette/Vaping     E-Cigarette/Vaping Substances     Social History     Tobacco Use    Smoking status: Never Smoker    Smokeless tobacco: Never Used   Substance Use Topics    Alcohol use: Yes     Comment: occasionally    Drug use: Never       Review of Systems   Constitutional: Positive for activity change, appetite change, chills, fatigue and fever  HENT: Negative for congestion, drooling and sore throat  Respiratory: Positive for cough and shortness of breath  Cardiovascular: Negative for chest pain and leg swelling  Gastrointestinal: Positive for abdominal pain and constipation  Negative for blood in stool, diarrhea, nausea and vomiting  Genitourinary: Positive for hematuria  Negative for dysuria and frequency  Musculoskeletal: Negative for arthralgias, falls and neck pain  Skin: Positive for pallor  Negative for color change and rash  Neurological: Negative for dizziness, weakness, light-headedness, numbness and headaches  Psychiatric/Behavioral: Negative for confusion  All other systems reviewed and are negative  Physical Exam  Physical Exam  Vitals signs and nursing note reviewed  Constitutional:       General: He is in acute distress  Appearance: Normal appearance  He is well-developed and well-groomed  He is ill-appearing  He is not diaphoretic  HENT:      Head: Normocephalic and atraumatic  Eyes:      Conjunctiva/sclera: Conjunctivae normal       Pupils: Pupils are equal    Neck:      Musculoskeletal: Normal range of motion and neck supple  Cardiovascular:      Rate and Rhythm: Regular rhythm  Tachycardia present  Heart sounds: Normal heart sounds  Pulmonary:      Effort: Pulmonary effort is normal       Breath sounds: Decreased breath sounds present  No wheezing or rhonchi  Abdominal:      General: Abdomen is flat  Bowel sounds are normal       Palpations: Abdomen is soft  Tenderness: There is abdominal tenderness in the right upper quadrant and right lower quadrant  There is no guarding or rebound  Musculoskeletal: Normal range of motion  Right lower leg: No edema  Left lower leg: No edema  Skin:     General: Skin is warm and dry  Coloration: Skin is pale  Findings: No rash  Neurological:      General: No focal deficit present  Mental Status: He is alert and oriented to person, place, and time  Motor: No weakness  Psychiatric:         Mood and Affect: Mood normal          Behavior: Behavior is cooperative           Vital Signs  ED Triage Vitals   Temperature Pulse Respirations Blood Pressure SpO2   02/05/21 1122 02/05/21 1128 02/05/21 1128 02/05/21 1129 02/05/21 1129   (!) 100 6 °F (38 1 °C) 60 20 124/83 94 %      Temp Source Heart Rate Source Patient Position - Orthostatic VS BP Location FiO2 (%)   02/05/21 1122 02/05/21 1128 -- -- --   Tympanic Monitor         Pain Score       02/05/21 1224       Med Not Given for Pain - for MAR use only           Vitals:    02/05/21 1129 02/05/21 1300 02/05/21 1400 02/05/21 1500   BP: 124/83 157/92 107/77 139/65   Pulse:  (!) 120 (!) 148 (!) 156         Visual Acuity      ED Medications  Medications   acetaminophen (TYLENOL) tablet 650 mg (650 mg Oral Given 2/5/21 1224)   sodium chloride 0 9 % bolus 500 mL (0 mL Intravenous Stopped 2/5/21 1418)   iohexol (OMNIPAQUE) 350 MG/ML injection (SINGLE-DOSE) 100 mL (100 mL Intravenous Given 2/5/21 1354)   piperacillin-tazobactam (ZOSYN) IVPB 3 375 g (0 g Intravenous Stopped 2/5/21 1515)   diltiazem (CARDIZEM) injection 15 mg (15 mg Intravenous Given 2/5/21 1419)   sodium chloride 0 9 % bolus 1,000 mL (0 mL Intravenous Stopped 2/5/21 1527)   diltiazem (CARDIZEM) 125 mg in sodium chloride 0 9 % 125 mL infusion (7 5 mg/hr Intravenous Rate/Dose Change 2/5/21 1530)       Diagnostic Studies  Results Reviewed     Procedure Component Value Units Date/Time    COVID19, Influenza A/B, RSV PCR, [facility] [[id number]]  (Normal) Collected: 02/05/21 1222    Lab Status: Final result Specimen: Nasopharyngeal Swab Updated: 02/05/21 1324     SARS-CoV-2 Negative     INFLUENZA A PCR Negative     INFLUENZA B PCR Negative     RSV PCR Negative    Narrative: This test has been authorized by FDA under an EUA (Emergency Use Assay) for use by authorized laboratories  Clinical caution and judgement should be used with the interpretation of these results with consideration of the clinical impression and other laboratory testing  Testing reported as "Positive" or "Negative" has been proven to be accurate according to standard laboratory validation requirements  All testing is performed with control materials showing appropriate reactivity at standard intervals  C-reactive protein [[id number]]  (Abnormal) Collected: 02/05/21 1223    Lab Status: Final result Specimen: Blood from Arm, Right Updated: 02/05/21 1308      8 mg/L     Lactic acid [[id number]]  (Normal) Collected: 02/05/21 1223    Lab Status: Final result Specimen: Blood from Arm, Right Updated: 02/05/21 1306     LACTIC ACID 1 6 mmol/L     Narrative:      Result may be elevated if tourniquet was used during collection      NT-BNP PRO [[id number]]  (Abnormal) Collected: 02/05/21 1223    Lab Status: Final result Specimen: Blood from Arm, Right Updated: 02/05/21 1306     NT-proBNP 1,311 pg/mL     Troponin I [[id number]]  (Normal) Collected: 02/05/21 1223    Lab Status: Final result Specimen: Blood from Arm, Right Updated: 02/05/21 1303     Troponin I <0 02 ng/mL     Comprehensive metabolic panel [[id number]]  (Abnormal) Collected: 02/05/21 1223    Lab Status: Final result Specimen: Blood from Arm, Right Updated: 02/05/21 1259     Sodium 132 mmol/L      Potassium 3 7 mmol/L      Chloride 96 mmol/L      CO2 25 mmol/L      ANION GAP 11 mmol/L      BUN 23 mg/dL      Creatinine 1 13 mg/dL      Glucose 136 mg/dL      Calcium 8 9 mg/dL      AST 19 U/L ALT 22 U/L      Alkaline Phosphatase 74 U/L      Total Protein 7 8 g/dL      Albumin 3 6 g/dL      Total Bilirubin 2 90 mg/dL      eGFR 62 ml/min/1 73sq m     Narrative:      [place] guidelines for Chronic Kidney Disease (CKD):     Stage 1 with normal or high GFR (GFR > 90 mL/min/1 73 square meters)    Stage 2 Mild CKD (GFR = 60-89 mL/min/1 73 square meters)    Stage 3A Moderate CKD (GFR = 45-59 mL/min/1 73 square meters)    Stage 3B Moderate CKD (GFR = 30-44 mL/min/1 73 square meters)    Stage 4 Severe CKD (GFR = 15-29 mL/min/1 73 square meters)    Stage 5 End Stage CKD (GFR <15 mL/min/1 73 square meters)  Note: GFR calculation is accurate only with a steady state creatinine    D-dimer, quantitative [[id number]]  (Abnormal) Collected: 02/05/21 1223    Lab Status: Final result Specimen: Blood from Arm, Right Updated: 02/05/21 1256     D-Dimer, Quant 2 40 ug/ml FEU     Protime-INR [[id number]]  (Abnormal) Collected: 02/05/21 1223    Lab Status: Final result Specimen: Blood from Arm, Right Updated: 02/05/21 1256     Protime 15 1 seconds      INR 1 19    APTT [[id number]]  (Normal) Collected: 02/05/21 1223    Lab Status: Final result Specimen: Blood from Arm, Right Updated: 02/05/21 1256     PTT 36 seconds     CBC and differential [[id number]]  (Abnormal) Collected: 02/05/21 1223    Lab Status: Final result Specimen: Blood from Arm, Right Updated: 02/05/21 1237     WBC 14 00 Thousand/uL      RBC 5 03 Million/uL      Hemoglobin 16 1 g/dL      Hematocrit 49 2 %      MCV 98 fL      MCH 32 0 pg      MCHC 32 7 g/dL      RDW 13 0 %      MPV 10 8 fL      Platelets 619 Thousands/uL      nRBC 0 /100 WBCs      Neutrophils Relative 81 %      Immat GRANS % 0 %      Lymphocytes Relative 13 %      Monocytes Relative 6 %      Eosinophils Relative 0 %      Basophils Relative 0 %      Neutrophils Absolute 11 22 Thousands/µL      Immature Grans Absolute 0 05 Thousand/uL      Lymphocytes Absolute 1 82 Thousands/µL      Monocytes Absolute 0 83 Thousand/µL      Eosinophils Absolute 0 04 Thousand/µL      Basophils Absolute 0 04 Thousands/µL     Blood culture #1 [[id number]] Collected: 02/05/21 1223    Lab Status: In process Specimen: Blood from Arm, Right Updated: 02/05/21 1236    Blood culture #2 [[id number]] Collected: 02/05/21 1223    Lab Status: In process Specimen: Blood from Arm, Left Updated: 02/05/21 1233    Procalcitonin with AM Reflex [[id number]] Collected: 02/05/21 1223    Lab Status: In process Specimen: Blood from Arm, Right Updated: 02/05/21 1231    Ferritin [[id number]] Collected: 02/05/21 1223    Lab Status: In process Specimen: Blood from Arm, Right Updated: 02/05/21 1231    UA w Reflex to Microscopic w Reflex to Culture [[id number]]     Lab Status: No result Specimen: Urine, Clean Catch                  CT abdomen pelvis with contrast   Final Result by Aleida Hester DO (02/05 1424)      1  Distended gallbladder with possible trace pericholecystic fluid but no gallstones  Given history, correlation with ultrasound recommended regarding possible cholecystitis  2  Colonic diverticulosis without evidence of diverticulitis  3  Solitary left kidney  4  Mild prostatomegaly  5  Small fat-containing supraumbilical ventral abdominal wall hernia  Additional incidental findings as above  Workstation performed: [id number]         XR chest portable   Final Result by Aziza Escobedo MD (02/05 1320)      No acute cardiopulmonary disease  Workstation performed: [id number]         US gallbladder    (Results Pending)              Procedures  ECG 12 Lead Documentation Only    Date/Time: 2/5/2021 12:00 PM  Performed by: Marilu Villalba PA-C  Authorized by: Marilu Villalba PA-C     Indications / Diagnosis:  Tachycardia  Patient location:  ED  Previous ECG:     Previous ECG:  Compared to current    Comparison ECG info:   Tachycardia now present    Similarity:  Changes noted  Rate:     ECG rate:  123    ECG rate assessment: tachycardic    Rhythm:     Rhythm: sinus tachycardia    Ectopy:     Ectopy: PVCs      PVCs:  Infrequent  Conduction:     Conduction: abnormal      Abnormal conduction: complete RBBB               ED Course  ED Course as of Feb 05 1538   Fri Feb 05, 2021   1411 Patient now in a-fib, will order cardizem  1451 Abnormal CT scan, will order Ultrasound  [address] [facility] paged about admission  [address]  [facility] in room to admit patient  Initial Sepsis Screening     Row Name 02/05/21 1311                Is the patient's history suggestive of a new or worsening infection? (!) Yes (Proceed)  -CD        Suspected source of infection  suspect infection, source unknown  -CD        Are two or more of the following signs & symptoms of infection both present and new to the patient? (!) Yes (Proceed)  -CD        Indicate SIRS criteria  Tachycardia > 90 bpm;Leukocytosis (WBC > 95524 IJL)  -CD        If the answer is yes to both questions, suspicion of sepsis is present  --        If severe sepsis is present AND tissue hypoperfusion perists in the hour after fluid resuscitation or lactate > 4, the patient meets criteria for SEPTIC SHOCK  --        Are any of the following organ dysfunction criteria present within 6 hours of suspected infection and SIRS criteria that are NOT considered to be chronic conditions?   No  -CD        Organ dysfunction  --        Date of presentation of severe sepsis  --        Time of presentation of severe sepsis  --        Tissue hypoperfusion persists in the hour after crystalloid fluid administration, evidenced, by either:  --        Was hypotension present within one hour of the conclusion of crystalloid fluid administration?  --        Date of presentation of septic shock  --        Time of presentation of septic shock  --          User Key  (r) = Recorded By, (t) = Taken By, (c) = Cosigned By    Initials Name Provider Type    EUSEBIO Izaguirre PA-C Physician Assistant          SBIRT 22yo+      Most Recent Value   SBIRT (25 yo +)   In order to provide better care to our patients, we are screening all of our patients for alcohol and drug use  Would it be okay to ask you these screening questions? Yes Filed at: 02/05/2021 1154   Initial Alcohol Screen: US AUDIT-C    1  How often do you have a drink containing alcohol?  0 Filed at: 02/05/2021 1154   2  How many drinks containing alcohol do you have on a typical day you are drinking? 0 Filed at: 02/05/2021 1154   3a  Male UNDER 65: How often do you have five or more drinks on one occasion? 0 Filed at: 02/05/2021 1154   3b  FEMALE Any Age, or MALE 65+: How often do you have 4 or more drinks on one occassion? 0 Filed at: 02/05/2021 1154   Audit-C Score  0 Filed at: 02/05/2021 1154   EFREM: How many times in the past year have you    Used an illegal drug or used a prescription medication for non-medical reasons? Never Filed at: 02/05/2021 1154                    MDM  Number of Diagnoses or Management Options  Abdominal lymphadenopathy: new and requires workup  Abdominal pain: new and requires workup  Enlarged prostate: established and worsening  Gallbladder disease: new and requires workup  New onset atrial fibrillation [facility]): new and requires workup  Diagnosis management comments: Patient with abdominal pain, fevers, wife with covid, will order labs, CT scan to r/o colitis, cholecystitis, pancreatitis  Patient developed new onset afib while in the ER, cardizem started  CT suspicious for gallbladder disease, will order u/s and admit  Patient with tachycardia, fever, lactic normal, does not appear in severe sepsis  Bili elevated due to gallbladder disease, will start fluids and zosyn          Amount and/or Complexity of Data Reviewed  Clinical lab tests: ordered and reviewed  Tests in the radiology section of CPT®: ordered and reviewed  Discuss the patient with other providers: yes (Dr Azul Hall)    Patient Progress  Patient progress: stable      Disposition  Final diagnoses:   Abdominal pain   Gallbladder disease   Abdominal lymphadenopathy   New onset atrial fibrillation ([address] )   Enlarged prostate     Time reflects when diagnosis was documented in both MDM as applicable and the Disposition within this note     Time User Action Codes Description Comment    2/5/2021  3:03 PM Llana Barthel Add [R10 9] Abdominal pain     2/5/2021  3:03 PM Llana Barthel Add [K82 9] Gallbladder disease     2/5/2021  3:03 PM Llana Barthel Add [R59 0] Abdominal lymphadenopathy     2/5/2021  3:04 PM Llana Barthel Add [I48 91] New onset atrial fibrillation ([address] )     2/5/2021  3:19 PM Llana Barthel Add [N40 0] Enlarged prostate       ED Disposition     ED Disposition Condition Date/Time Comment    Admit Stable Fri Feb 5, 2021  3:04 PM Case was discussed with Antony Mahoney and the patient's admission status was agreed to be Admission Status: inpatient status to the service of Dr Robert Kevin   Follow-up Information    None         Patient's Medications   Discharge Prescriptions    No medications on file     No discharge procedures on file      PDMP Review     None          ED Provider  Electronically Signed by           Yolette Childs PA-C  02/05/21 7506

## 2021-02-05 NOTE — H&P
H&P- Irina Henning 1945, 66 y o  male MRN: [id number]    Unit/Bed#: ED 02 Encounter: [id number]    Primary Care Provider: No primary care provider on file  Date and time admitted to hospital: 2/5/2021 11:40 AM        * Right upper quadrant abdominal pain  Assessment & Plan  · Presented to the ER on 02/05 with fevers, chills, abdominal pain x2 days  · WBC count 14 on arrival  · CT scan showed distended gallbladder - right upper quadrant ultrasound pending  · Keep NPO    PAF (paroxysmal atrial fibrillation) ([facility])  Assessment & Plan  · Presented with new onset rapid AFib with heart rates 160s to 170s  · Received Cardizem bolus and drip in the ER  · Patient takes Lopressor home - consider IV Lopressor  · Cardiology consult pending    SIRS (systemic inflammatory response syndrome) ([facility] [address] )  Assessment & Plan  · Patient presented with fever 100 6, tachycardia 148, WBC 14  · Suspected source gallbladder  · Received Zosyn in the ER - continue for now  · Procalcitonin, blood cultures pending  · CT scan showed distended gallbladder - right upper quadrant ultrasound pending    Hypertension  Assessment & Plan  · Currently normotensive  · Hold home dose Lopressor        VTE Prophylaxis: Enoxaparin (Lovenox)  / sequential compression device   Code Status:  Full code  POLST: POLST form is not discussed and not completed at this time  Anticipated Length of Stay:  Patient will be admitted on an Inpatient basis with an anticipated length of stay of  greater than 2 midnights  Justification for Hospital Stay:  Rapid AFib, abdominal pain    Total Time for Visit, including Counseling / Coordination of Care: 30 minutes  Greater than 50% of this total time spent on direct patient counseling and coordination of care      Chief Complaint:   Abdominal pain    History of Present Illness:    Irina Henning is a 66 y o  male who presents with a past medical history of hypertension a who presented to the ER on 02/05/2021 with fevers, chills, fatigue, abdominal pain and hematuria started 2 days ago  He states it is mostly right upper quadrant and he also noted some shaking, fevers and chills  Of note, his wife tested positive for COVID-19 2 weeks ago however the patient is on room air and currently denies any shortness of breath  He denies any chest pain, leg pain, swelling, but that palpitations, or dizziness  In the ER, the patient was noted to be in rapid AFib with heart rates 160s to 170s  He was normotensive and stated his abdominal pain was 3/10  WBC count 14, D-dimer 2 4, troponin negative, COVID-19 negative, lactic 1 6  CT scan showed distended gallbladder patient is pending for right upper quadrant ultrasound  The patient received IV Zosyn in the ER and cultures are pending  Patient will be admitted to the step-down to unit for further monitoring/workup will require greater than 2 midnight stay  Review of Systems:    Review of Systems   Constitutional: Positive for fatigue and fever  HENT: Negative  Eyes: Negative  Respiratory: Negative  Cardiovascular: Negative  Gastrointestinal: Positive for abdominal distention and abdominal pain  Endocrine: Negative  Genitourinary: Negative  Musculoskeletal: Negative  Skin: Negative  Allergic/Immunologic: Negative  Neurological: Positive for weakness  Hematological: Negative  Psychiatric/Behavioral: Negative  Past Medical and Surgical History:     Past Medical History:   Diagnosis Date    Dysphagia     Hypertension        Past Surgical History:   Procedure Laterality Date    CORONARY STENT PLACEMENT         Meds/Allergies:    Prior to Admission medications    Medication Sig Start Date End Date Taking?  Authorizing Provider   aspirin (ECOTRIN LOW STRENGTH) 81 mg EC tablet Take 81 mg by mouth daily    Historical Provider, MD   METOPROLOL SUCCINATE PO Take by mouth    Historical Provider, MD   omeprazole (PriLOSEC) 20 mg delayed release capsule Take 20 mg by mouth daily    Historical Provider, MD     I have reviewed home medications with patient personally  Allergies: Allergies   Allergen Reactions    Pollen Extract        Social History:     Marital Status: Single     Substance Use History:   Social History     Substance and Sexual Activity   Alcohol Use Yes    Comment: occasionally     Social History     Tobacco Use   Smoking Status Never Smoker   Smokeless Tobacco Never Used     Social History     Substance and Sexual Activity   Drug Use Never       Family History:    non-contributory    Physical Exam:     Vitals:   Blood Pressure: 107/77 (02/05/21 1400)  Pulse: (!) 148 (02/05/21 1400)  Temperature: (!) 100 6 °F (38 1 °C) (02/05/21 1122)  Temp Source: Tympanic (02/05/21 1122)  Respirations: 20 (02/05/21 1400)  Height: 5' 8" (172 7 cm) (02/05/21 1122)  Weight - Scale: 83 9 kg (185 lb) (02/05/21 1122)  SpO2: 94 % (02/05/21 1400)    Physical Exam  Vitals signs and nursing note reviewed  Constitutional:       Appearance: He is obese  HENT:      Head: Normocephalic and atraumatic  Right Ear: Tympanic membrane, ear canal and external ear normal       Left Ear: Tympanic membrane, ear canal and external ear normal       Nose: Nose normal       Mouth/Throat:      Mouth: Mucous membranes are dry  Eyes:      Extraocular Movements: Extraocular movements intact  Conjunctiva/sclera: Conjunctivae normal       Pupils: Pupils are equal, round, and reactive to light  Neck:      Musculoskeletal: Normal range of motion and neck supple  Cardiovascular:      Rate and Rhythm: Rhythm irregular  Pulses: Normal pulses  Heart sounds: Normal heart sounds  Comments: Atrial fibrillation  Pulmonary:      Comments: Bilateral breath sounds diminished  On room air  Abdominal:      Palpations: Abdomen is soft  Comments:  Bowel sounds hypoactive  Abdomen tender on palpation   Genitourinary:     Comments: Voids appropriately in the urinal  Musculoskeletal:      Comments: Generalized weakness   Skin:     General: Skin is warm and dry  Capillary Refill: Capillary refill takes less than 2 seconds  Neurological:      Mental Status: He is alert and oriented to person, place, and time  Mental status is at baseline  Psychiatric:         Mood and Affect: Mood normal          Behavior: Behavior normal            Additional Data:     Lab Results: I have personally reviewed pertinent reports  Results from last 7 days   Lab Units 02/05/21  1223   WBC Thousand/uL 14 00*   HEMOGLOBIN g/dL 16 1   HEMATOCRIT % 49 2   PLATELETS Thousands/uL 186   NEUTROS PCT % 81*   LYMPHS PCT % 13*   MONOS PCT % 6   EOS PCT % 0     Results from last 7 days   Lab Units 02/05/21  1223   SODIUM mmol/L 132*   POTASSIUM mmol/L 3 7   CHLORIDE mmol/L 96*   CO2 mmol/L 25   BUN mg/dL 23   CREATININE mg/dL 1 13   ANION GAP mmol/L 11   CALCIUM mg/dL 8 9   ALBUMIN g/dL 3 6   TOTAL BILIRUBIN mg/dL 2 90*   ALK PHOS U/L 74   ALT U/L 22   AST U/L 19   GLUCOSE RANDOM mg/dL 136     Results from last 7 days   Lab Units 02/05/21  1223   INR  1 19             Results from last 7 days   Lab Units 02/05/21  1223   LACTIC ACID mmol/L 1 6       Imaging: I have personally reviewed pertinent reports  CT abdomen pelvis with contrast   Final Result by Wyvonnia Baumgarten, DO (02/05 1424)      1  Distended gallbladder with possible trace pericholecystic fluid but no gallstones  Given history, correlation with ultrasound recommended regarding possible cholecystitis  2  Colonic diverticulosis without evidence of diverticulitis  3  Solitary left kidney  4  Mild prostatomegaly  5  Small fat-containing supraumbilical ventral abdominal wall hernia  Additional incidental findings as above  Workstation performed: [id number]         XR chest portable   Final Result by Mikki Sibley MD (02/05 1320)      No acute cardiopulmonary disease                    Workstation performed: [id number]         US gallbladder    (Results Pending)       EKG, Pathology, and Other Studies Reviewed on Admission:   · EKG:  Rapid AFib    Allscripts / Epic Records Reviewed: Yes     ** Please Note: This note has been constructed using a voice recognition syste

## 2021-02-05 NOTE — ASSESSMENT & PLAN NOTE
· Presented to the ER on 02/05 with fevers, chills, abdominal pain x2 days  · WBC count 14 on arrival  · CT scan showed distended gallbladder - right upper quadrant ultrasound pending  · Keep NPO

## 2021-02-06 PROBLEM — R78.81 GRAM-NEGATIVE BACTEREMIA: Status: ACTIVE | Noted: 2021-02-06

## 2021-02-06 PROBLEM — R13.10 DYSPHAGIA: Status: ACTIVE | Noted: 2021-02-06

## 2021-02-06 PROBLEM — E87.1 HYPONATREMIA: Status: ACTIVE | Noted: 2021-02-06

## 2021-02-06 LAB
ALBUMIN SERPL BCP-MCNC: 2.7 G/DL (ref 3.5–5)
ALP SERPL-CCNC: 64 U/L (ref 46–116)
ALT SERPL W P-5'-P-CCNC: 23 U/L (ref 12–78)
ANION GAP SERPL CALCULATED.3IONS-SCNC: 11 MMOL/L (ref 4–13)
APTT PPP: 117 SECONDS (ref 23–37)
APTT PPP: 194 SECONDS (ref 23–37)
APTT PPP: 77 SECONDS (ref 23–37)
AST SERPL W P-5'-P-CCNC: 20 U/L (ref 5–45)
BASOPHILS # BLD AUTO: 0.05 THOUSANDS/ΜL (ref 0–0.1)
BASOPHILS NFR BLD AUTO: 0 % (ref 0–1)
BILIRUB SERPL-MCNC: 2.1 MG/DL (ref 0.2–1)
BUN SERPL-MCNC: 21 MG/DL (ref 5–25)
CALCIUM ALBUM COR SERPL-MCNC: 9.1 MG/DL (ref 8.3–10.1)
CALCIUM SERPL-MCNC: 8.1 MG/DL (ref 8.3–10.1)
CHLORIDE SERPL-SCNC: 102 MMOL/L (ref 100–108)
CHOLEST SERPL-MCNC: 131 MG/DL (ref 50–200)
CO2 SERPL-SCNC: 21 MMOL/L (ref 21–32)
CREAT SERPL-MCNC: 1.12 MG/DL (ref 0.6–1.3)
EOSINOPHIL # BLD AUTO: 0.03 THOUSAND/ΜL (ref 0–0.61)
EOSINOPHIL NFR BLD AUTO: 0 % (ref 0–6)
ERYTHROCYTE [DISTWIDTH] IN BLOOD BY AUTOMATED COUNT: 13.2 % (ref 11.6–15.1)
GFR SERPL CREATININE-BSD FRML MDRD: 63 ML/MIN/1.73SQ M
GLUCOSE SERPL-MCNC: 111 MG/DL (ref 65–140)
HCT VFR BLD AUTO: 44.4 % (ref 36.5–49.3)
HDLC SERPL-MCNC: 47 MG/DL
HGB BLD-MCNC: 14 G/DL (ref 12–17)
IMM GRANULOCYTES # BLD AUTO: 0.04 THOUSAND/UL (ref 0–0.2)
IMM GRANULOCYTES NFR BLD AUTO: 0 % (ref 0–2)
LDLC SERPL CALC-MCNC: 62 MG/DL (ref 0–100)
LIPASE SERPL-CCNC: 42 U/L (ref 73–393)
LYMPHOCYTES # BLD AUTO: 2.05 THOUSANDS/ΜL (ref 0.6–4.47)
LYMPHOCYTES NFR BLD AUTO: 17 % (ref 14–44)
MAGNESIUM SERPL-MCNC: 2.4 MG/DL (ref 1.6–2.6)
MCH RBC QN AUTO: 32.3 PG (ref 26.8–34.3)
MCHC RBC AUTO-ENTMCNC: 31.5 G/DL (ref 31.4–37.4)
MCV RBC AUTO: 103 FL (ref 82–98)
MONOCYTES # BLD AUTO: 1.05 THOUSAND/ΜL (ref 0.17–1.22)
MONOCYTES NFR BLD AUTO: 9 % (ref 4–12)
NEUTROPHILS # BLD AUTO: 9.14 THOUSANDS/ΜL (ref 1.85–7.62)
NEUTS SEG NFR BLD AUTO: 74 % (ref 43–75)
NONHDLC SERPL-MCNC: 84 MG/DL
NRBC BLD AUTO-RTO: 0 /100 WBCS
PLATELET # BLD AUTO: 159 THOUSANDS/UL (ref 149–390)
PMV BLD AUTO: 10.5 FL (ref 8.9–12.7)
POTASSIUM SERPL-SCNC: 3.6 MMOL/L (ref 3.5–5.3)
PROCALCITONIN SERPL-MCNC: 5.34 NG/ML
PROT SERPL-MCNC: 6.4 G/DL (ref 6.4–8.2)
RBC # BLD AUTO: 4.33 MILLION/UL (ref 3.88–5.62)
SODIUM SERPL-SCNC: 134 MMOL/L (ref 136–145)
TRIGL SERPL-MCNC: 110 MG/DL
WBC # BLD AUTO: 12.36 THOUSAND/UL (ref 4.31–10.16)

## 2021-02-06 PROCEDURE — 85025 COMPLETE CBC W/AUTO DIFF WBC: CPT | Performed by: NURSE PRACTITIONER

## 2021-02-06 PROCEDURE — 84145 PROCALCITONIN (PCT): CPT | Performed by: PHYSICIAN ASSISTANT

## 2021-02-06 PROCEDURE — 99223 1ST HOSP IP/OBS HIGH 75: CPT | Performed by: SURGERY

## 2021-02-06 PROCEDURE — 80053 COMPREHEN METABOLIC PANEL: CPT | Performed by: NURSE PRACTITIONER

## 2021-02-06 PROCEDURE — 85730 THROMBOPLASTIN TIME PARTIAL: CPT | Performed by: NURSE PRACTITIONER

## 2021-02-06 PROCEDURE — 87040 BLOOD CULTURE FOR BACTERIA: CPT | Performed by: INTERNAL MEDICINE

## 2021-02-06 PROCEDURE — 80061 LIPID PANEL: CPT | Performed by: INTERNAL MEDICINE

## 2021-02-06 PROCEDURE — 94760 N-INVAS EAR/PLS OXIMETRY 1: CPT

## 2021-02-06 PROCEDURE — 99233 SBSQ HOSP IP/OBS HIGH 50: CPT | Performed by: NURSE PRACTITIONER

## 2021-02-06 PROCEDURE — 85730 THROMBOPLASTIN TIME PARTIAL: CPT | Performed by: INTERNAL MEDICINE

## 2021-02-06 PROCEDURE — 99232 SBSQ HOSP IP/OBS MODERATE 35: CPT | Performed by: INTERNAL MEDICINE

## 2021-02-06 PROCEDURE — 83690 ASSAY OF LIPASE: CPT | Performed by: INTERNAL MEDICINE

## 2021-02-06 PROCEDURE — 93005 ELECTROCARDIOGRAM TRACING: CPT

## 2021-02-06 PROCEDURE — 83735 ASSAY OF MAGNESIUM: CPT | Performed by: NURSE PRACTITIONER

## 2021-02-06 RX ORDER — SODIUM CHLORIDE 9 MG/ML
75 INJECTION, SOLUTION INTRAVENOUS CONTINUOUS
Status: DISCONTINUED | OUTPATIENT
Start: 2021-02-06 | End: 2021-02-08

## 2021-02-06 RX ORDER — METOPROLOL TARTRATE 50 MG/1
50 TABLET, FILM COATED ORAL EVERY 12 HOURS SCHEDULED
Status: DISCONTINUED | OUTPATIENT
Start: 2021-02-06 | End: 2021-02-07

## 2021-02-06 RX ADMIN — METOPROLOL TARTRATE 50 MG: 50 TABLET, FILM COATED ORAL at 21:42

## 2021-02-06 RX ADMIN — PIPERACILLIN AND TAZOBACTAM 3.38 G: 3; .375 INJECTION, POWDER, LYOPHILIZED, FOR SOLUTION INTRAVENOUS at 04:47

## 2021-02-06 RX ADMIN — PIPERACILLIN AND TAZOBACTAM 3.38 G: 3; .375 INJECTION, POWDER, LYOPHILIZED, FOR SOLUTION INTRAVENOUS at 13:54

## 2021-02-06 RX ADMIN — ACETAMINOPHEN 650 MG: 325 TABLET, FILM COATED ORAL at 11:13

## 2021-02-06 RX ADMIN — PIPERACILLIN AND TAZOBACTAM 3.38 G: 3; .375 INJECTION, POWDER, LYOPHILIZED, FOR SOLUTION INTRAVENOUS at 20:02

## 2021-02-06 RX ADMIN — HEPARIN SODIUM 12 UNITS/KG/HR: 10000 INJECTION, SOLUTION INTRAVENOUS at 11:09

## 2021-02-06 RX ADMIN — SODIUM CHLORIDE 75 ML/HR: 0.9 INJECTION, SOLUTION INTRAVENOUS at 10:25

## 2021-02-06 RX ADMIN — METOPROLOL TARTRATE 25 MG: 25 TABLET, FILM COATED ORAL at 10:25

## 2021-02-06 RX ADMIN — PIPERACILLIN AND TAZOBACTAM 3.38 G: 3; .375 INJECTION, POWDER, LYOPHILIZED, FOR SOLUTION INTRAVENOUS at 07:48

## 2021-02-06 NOTE — ASSESSMENT & PLAN NOTE
· Presented with new onset rapid AFib with heart rates 160s to 170s  · Received Cardizem bolus and drip in the ER  · Patient takes Lopressor home - consider IV Lopressor  · Cardiology consult pending  · Given gentle 500ml Isolyte bolus 2/2 continued tachycardia   · BNP 1,311

## 2021-02-06 NOTE — ASSESSMENT & PLAN NOTE
· Patient presented with fever 100 6, tachycardia 148, WBC 14, procall 2 33  · Suspected source gallbladder  · Received Zosyn in the ER - continue for now  · Procalcitonin, blood cultures pending  · CT scan showed distended gallbladder - right upper quadrant ultrasound pending  · RUQ US as above  · Surgery consult as above  · Trend WBC/fever curve  · Watch for [facility] return

## 2021-02-06 NOTE — PROGRESS NOTES
Progress Note - Jenny Charter 1945, 66 y o  male MRN: [id number]    Unit/Bed#: -01 Encounter: [id number]    Primary Care Provider: No primary care provider on file  Date and time admitted to hospital: 2/5/2021 11:40 AM        * Right upper quadrant abdominal pain  Assessment & Plan  · Presented to the ER on 02/05 with fevers, chills, abdominal pain x2 days  · WBC count 14 on arrival  · CT scan showed distended gallbladder  · RUQ US "Sludge in the gallbladder which is distended and demonstrates wall thickening"  · Keep NPO  · Surgery consulted, continue zosyn    PAF (paroxysmal atrial fibrillation) ([facility] [address] )  Assessment & Plan  · Presented with new onset rapid AFib with heart rates 160s to 170s  · Received Cardizem bolus and drip in the ER  · Patient takes Lopressor home - consider IV Lopressor  · Cardiology consult pending  · Given gentle 500ml Isolyte bolus 2/2 continued tachycardia   · BNP 1,311    [facility]  Assessment & Plan  · Patient presented with fever 100 6, tachycardia 148, WBC 14, procall 2 33  · Suspected source gallbladder  · Received Zosyn in the ER - continue for now  · Procalcitonin, blood cultures pending  · CT scan showed distended gallbladder - right upper quadrant ultrasound pending  · RUQ US as above  · Surgery consult as above  · Trend WBC/fever curve  · Watch for [facility] return    Hypertension  Assessment & Plan  · Currently normotensive  · Hold home dose Lopressor     Hyponatremia  Assessment & Plan  · sNa 132 on admission  · Mild, trend    Dysphagia  Assessment & Plan  · Hx of esophageal stricture requiring esophageal dilation      VTE Pharmacologic Prophylaxis:   Pharmacologic: Heparin Drip  Mechanical VTE Prophylaxis in Place: Yes    Patient Centered Rounds: I have performed bedside rounds with nursing staff today      Discussions with Specialists or Other Care Team Provider: Cardiology    Education and Discussions with Family / Patient: Discussed with patient    Time Spent for Care: 15 minutes  More than 50% of total time spent on counseling and coordination of care as described above  Current Length of Stay: 1 day(s)    Current Patient Status: Inpatient   Certification Statement: The patient will continue to require additional inpatient hospital stay due to SIRS, atrial fibrillation, hyponatremia    Discharge Plan: Pending further evaluation by surgical team    Code Status: Level 1 - Full Code      Subjective:   "I am feeling ok, I got some rest"    Objective:     Vitals:   Temp (24hrs), Av °F (37 2 °C), Min:98 °F (36 7 °C), Max:100 6 °F (38 1 °C)    Temp:  [98 °F (36 7 °C)-100 6 °F (38 1 °C)] 98 7 °F (37 1 °C)  HR:  [] 108  Resp:  [17-34] 20  BP: ()/(48-93) 105/66  SpO2:  [92 %-99 %] 97 %  Body mass index is 30 84 kg/m²  Input and Output Summary (last 24 hours): Intake/Output Summary (Last 24 hours) at 2021 0226  Last data filed at 2021 2306  Gross per 24 hour   Intake 1817 97 ml   Output 900 ml   Net 917 97 ml       Physical Exam:     Physical Exam  Vitals signs and nursing note reviewed  Constitutional:       General: He is not in acute distress  Appearance: He is not ill-appearing, toxic-appearing or diaphoretic  HENT:      Head: Normocephalic and atraumatic  Mouth/Throat:      Mouth: Mucous membranes are dry  Neck:      Musculoskeletal: Neck supple  Cardiovascular:      Rate and Rhythm: Rhythm irregularly irregular  Pulmonary:      Effort: Pulmonary effort is normal       Breath sounds: Normal breath sounds and air entry  Abdominal:      General: Abdomen is flat  There is no distension  Palpations: Abdomen is soft  Tenderness: There is abdominal tenderness in the right upper quadrant  There is no guarding or rebound  Negative signs include Tamayo's sign  Musculoskeletal:      Right lower leg: No edema  Left lower leg: No edema     Neurological:      General: No focal deficit present  Mental Status: He is alert and oriented to person, place, and time  GCS: GCS eye subscore is 4  GCS verbal subscore is 5  GCS motor subscore is 6  Additional Data:     Labs:    Results from last 7 days   Lab Units 02/05/21  1223   WBC Thousand/uL 14 00*   HEMOGLOBIN g/dL 16 1   HEMATOCRIT % 49 2   PLATELETS Thousands/uL 186   NEUTROS PCT % 81*   LYMPHS PCT % 13*   MONOS PCT % 6   EOS PCT % 0     Results from last 7 days   Lab Units 02/05/21  1223   SODIUM mmol/L 132*   POTASSIUM mmol/L 3 7   CHLORIDE mmol/L 96*   CO2 mmol/L 25   BUN mg/dL 23   CREATININE mg/dL 1 13   ANION GAP mmol/L 11   CALCIUM mg/dL 8 9   ALBUMIN g/dL 3 6   TOTAL BILIRUBIN mg/dL 2 90*   ALK PHOS U/L 74   ALT U/L 22   AST U/L 19   GLUCOSE RANDOM mg/dL 136     Results from last 7 days   Lab Units 02/05/21  1223   INR  1 19             Results from last 7 days   Lab Units 02/05/21  1223   LACTIC ACID mmol/L 1 6   PROCALCITONIN ng/ml 2 33*           * I Have Reviewed All Lab Data Listed Above  * Additional Pertinent Lab Tests Reviewed: All Labs Within Last 24 Hours Reviewed    Imaging:    Imaging Reports Reviewed Today Include: CT abd and pelvis, RUQ US  Imaging Personally Reviewed by Myself Includes:  None    Recent Cultures (last 7 days):     Results from last 7 days   Lab Units 02/05/21  1223   BLOOD CULTURE  Received in Microbiology Lab  Culture in Progress  Received in Microbiology Lab  Culture in Progress         Last 24 Hours Medication List:   Current Facility-Administered Medications   Medication Dose Route Frequency Provider Last Rate    acetaminophen  650 mg Oral Q6H PRN OLAF Arizmendi      diltiazem  1-15 mg/hr Intravenous Titrated Dee Ponce MD 15 mg/hr (02/05/21 1838)    heparin (porcine)  18 Units/kg/hr (Order-Specific) Intravenous Titrated OLAF Arizmendi 15 Units/kg/hr (02/06/21 0019)    heparin (porcine)  3,600 Units Intravenous Q1H PRN OLAF Arizmendi      heparin (porcine) 7,200 Units Intravenous Q1H PRN OLAF Weeks      levalbuterol  1 25 mg Nebulization Q6H PRN Erven Anna, MD      metoprolol  5 mg Intravenous Q6H PRN Erven Mail, MD      piperacillin-tazobactam  3 375 g Intravenous Q6H OLAF Weeks Stopped (02/05/21 2117)    sodium chloride  3 mL Nebulization Q6H PRN Erven Mail, MD          Today, Patient Was Seen By: OLAF Simmons    ** Please Note: Dictation voice to text software may have been used in the creation of this document   **

## 2021-02-06 NOTE — CONSULTS
Consultation - General Surgery   Leonardo Payan 66 y o  male MRN: [id number]  Unit/Bed#: -01 Encounter: [id number]      Assessment/ Plan:    Epigastric/RUQ abdominal pain  · CT AP 2/5/2021 with distended gallbladder and trace pericholecystic fluid  · US gallbladder 2/5/2021 with sludge in distended gallbladder measuring 9 3cm, gallbladder wall thickening, no stone  · Likely acute cholecystitis   · Given patient's new onset afib with rvr and sepsis will wait until tomorrow for decision on lap ivon vs  Perc ivon tube   · Pain is improving today though  to palpation in RUQ/epigastrum  · CLD today, NPO at [place]  · IVF per primary   · Continue Zosyn   · Appreciate cardiology recommendations and clearance for possible OR      CAD  · History of CAD with stents in 2018, patient follows with [place] cardiologist   · Troponin x1 on admission negative    Afib with RVR  · New onset, on heparin drip per cardiology to be transitioned to eliquis if no surgical intervention needed  · NSR this morning with controlled rate  Frequent PVCs noted on telemetry  · Appreciate cardiology recommendations    SIRS  · Continue Zosyn   · Tmax  100 7 this morning at 0742  · Blood cultures pending, gram negative rods on stain  · Management per primary team       __________________________________________________________  Physician Requesting Consult: Domonique El MD    Additional consultants: Cardiology    Reason for Consult / Principal Problem: Abdominal pain     HPI: Leonardo Payan is a 66y o  year old male with CAD s/p stents in 2018, HTN, who presented to the ED 2/5/2021 with 2 day history of RUQ/epigastric abdominal pain and fever/chills  On presentation to the ED he was found to be in new onset afib with RVR and septic  He reports abdominal pain after eating hotdogs and having pie on Wednesday evening  He denies radiation of the pain or associated n/v/d   He had a similar episode after drinking eggnog in 2018 and went to the hospital in [place]  He does not recall being told there was a gallbladder problem but pain resolved with conservative management  He denies constipation and bowel movement yesterday was normal for him  He does admit teresa colored urine in the last few days  Historical Information   Past Medical History:   Diagnosis Date    Dysphagia     Hypertension      Past Surgical History:   Procedure Laterality Date    CORONARY STENT PLACEMENT       Social History   Social History     Substance and Sexual Activity   Alcohol Use Yes    Comment: occasionally     Social History     Substance and Sexual Activity   Drug Use Never     Social History     Tobacco Use   Smoking Status Never Smoker   Smokeless Tobacco Never Used     Family History: non-contributory}    Meds/Allergies   Home meds:   Prior to Admission medications    Medication Sig Start Date End Date Taking?  Authorizing Provider   aspirin (ECOTRIN LOW STRENGTH) 81 mg EC tablet Take 81 mg by mouth daily    Historical Provider, MD   METOPROLOL SUCCINATE PO Take by mouth    Historical Provider, MD   omeprazole (PriLOSEC) 20 mg delayed release capsule Take 20 mg by mouth daily    Historical Provider, MD     Scheduled Meds:  Current Facility-Administered Medications   Medication Dose Route Frequency Provider Last Rate    acetaminophen  650 mg Oral Q6H PRN OLAF Arizmendi      diltiazem  1-15 mg/hr Intravenous Titrated Dee Ponce MD Stopped (02/06/21 0600)    heparin (porcine)  18 Units/kg/hr (Order-Specific) Intravenous Titrated RAJWINDER ArizmendiNP 12 Units/kg/hr (02/06/21 0741)    heparin (porcine)  3,600 Units Intravenous Q1H PRN RAJWINDER ArizmendiNP      heparin (porcine)  7,200 Units Intravenous Q1H PRN RAJWINDER ArizmendiNP      levalbuterol  1 25 mg Nebulization Q6H PRN Dee Ponce MD      metoprolol  5 mg Intravenous Q6H PRN Dee Ponce MD      piperacillin-tazobactam  3 375 g Intravenous Q6H OLAF Arizmendi Stopped (02/06/21 0818)   Jayla Navarrete sodium chloride  3 mL Nebulization Q6H PRN Cecelia White MD       Continuous Infusions:diltiazem, 1-15 mg/hr, Last Rate: Stopped (02/06/21 0600)  heparin (porcine), 18 Units/kg/hr (Order-Specific), Last Rate: 12 Units/kg/hr (02/06/21 0741)      PRN Meds:    acetaminophen    heparin (porcine)    heparin (porcine)    levalbuterol    metoprolol    sodium chloride    ALLERGIES:   Allergies   Allergen Reactions    Pollen Extract        Review of Systems:  General: positive for  - chills and fever  Cardiovascular: no chest pain or dyspnea on exertion  Admits history of emphysema but quit smoking 40 years ago  Respiratory: no cough, shortness of breath, or wheezing  Gastrointestinal: positive for - abdominal pain  Genitourinary: no dysuria, trouble voiding, or hematuria  Musculoskeletal: negative  Neurological: no TIA or stroke symptoms  Hematological and Lymphatic: negative  Dermatological : negative  Psychological: negative  Ophthalmic: negative  ENT: negative      Objective   Vitals:  Blood pressure 152/63, pulse 92, temperature (!) 100 7 °F (38 2 °C), temperature source Axillary, resp  rate 22, height 5' 8" (1 727 m), weight 92 kg (202 lb 13 2 oz), SpO2 96 %  Body mass index is 30 84 kg/m²  SpO2: SpO2: 96 %    I/Os:  I/O       02/04 0701 - 02/05 0700 02/05 0701 - 02/06 0700 02/06 0701 - 02/07 0700    P  O   0     I V  (mL/kg)  824 4 (9) 42 1 (0 5)    IV Piggyback  1700 100    Total Intake(mL/kg)  2524 4 (27 4) 142 1 (1 5)    Urine (mL/kg/hr)  1150 200 (0 9)    Total Output  1150 200    Net  +1374 4 -57 9                 Invasive Lines/Tubes:  Invasive Devices     Peripheral Intravenous Line            Peripheral IV 02/05/21 Left Antecubital less than 1 day    Peripheral IV 02/05/21 Right Antecubital less than 1 day    Peripheral IV 02/05/21 Right Hand less than 1 day                Physical Exam  Constitutional:       Appearance: Normal appearance  HENT:      Head: Normocephalic and atraumatic  Nose: Nose normal       Mouth/Throat:      Mouth: Mucous membranes are moist    Eyes:      General: No scleral icterus  Extraocular Movements: Extraocular movements intact  Pupils: Pupils are equal, round, and reactive to light  Neck:      Musculoskeletal: Neck supple  Cardiovascular:      Rate and Rhythm: Normal rate  Pulmonary:      Effort: Pulmonary effort is normal    Abdominal:      General: Abdomen is flat  Bowel sounds are normal  There is no distension  Palpations: Abdomen is soft  There is no mass  Tenderness: There is abdominal tenderness  There is no guarding  Hernia: A hernia is present  Comments: Reducible non-tender supraumbilical hernia   RUQ and epigastric tenderness to light palpation      Skin:     General: Skin is warm and dry  Neurological:      Mental Status: He is alert and oriented to person, place, and time     Psychiatric:         Mood and Affect: Mood normal          Behavior: Behavior normal       Comments: poor historian           Lab Results and Cultures:   CBC:   Results from last 7 days   Lab Units 02/06/21  0604 02/05/21  1223   WBC Thousand/uL 12 36* 14 00*   HEMOGLOBIN g/dL 14 0 16 1   HEMATOCRIT % 44 4 49 2   PLATELETS Thousands/uL 159 186     BMP/CMP:  Results from last 7 days   Lab Units 02/06/21  0604 02/05/21  1223   POTASSIUM mmol/L 3 6 3 7   CHLORIDE mmol/L 102 96*   CO2 mmol/L 21 25   BUN mg/dL 21 23   CREATININE mg/dL 1 12 1 13   CALCIUM mg/dL 8 1* 8 9     Coags:   Results from last 7 days   Lab Units 02/06/21  0604 02/05/21  2228 02/05/21  1223   INR   --   --  1 19   PTT seconds 194* >210* 36     Lipid panel:   Results from last 7 days   Lab Units 02/06/21  0604   TRIGLYCERIDES mg/dL 110   HDL mg/dL 47     HgbA1c: No results found for: HGBA1C    Urinalysis:   Lab Results   Component Value Date    COLORU Yellow 02/05/2021    CLARITYU Clear 02/05/2021    SPECGRAV <=1 005 02/05/2021    PHUR 5 5 02/05/2021    LEUKOCYTESUR Negative 02/05/2021    NITRITE Negative 02/05/2021    GLUCOSEU Negative 02/05/2021    KETONESU 15 (1+) (A) 02/05/2021    BILIRUBINUR Negative 02/05/2021    BLOODU Small (A) 02/05/2021   ,   Urine Culture: No results found for: URINECX  Wound Culure: No results found for: WOUNDCULT  Blood Culture:   Lab Results   Component Value Date    BLOODCX Received in Microbiology Lab  Culture in Progress  02/05/2021       Imaging Studies: I have personally reviewed pertinent reports  EKG, Pathology, and Other Studies: I have personally reviewed pertinent reports  VTE Prophylaxis: Heparin     Code Status: Level 1 - Full Code    Counseling / Coordination of Care  Counseling/Coordination of Care: Total floor / unit time spent today 30 minutes  Greater than 50% of total time was spent with the patient and / or family counseling and / or coordination of care   A description of the counseling / coordination of care: discussion with nursing and updating patient's significant other        Shruthi Heard PA-C  2/6/2021

## 2021-02-06 NOTE — PROGRESS NOTES
Cardiology Progress Note - Bradley Salinas 66 y o  male MRN: [id number]    Unit/Bed#: -01 Encounter: [id number]      Assessment:  Principal Problem:    Right upper quadrant abdominal pain  Active Problems:    PAF (paroxysmal atrial fibrillation) (HCC)    SIRS (systemic inflammatory response syndrome) (HCC)    Hypertension    Dysphagia    Hyponatremia      Plan:    1  Atrial fibrillation with rapid ventricular response - New onset and has since converted back to sinus rhythm  Since he does have room and his blood pressure we will up titrate metoprolol to 50 mg twice daily  Continue to follow telemetry  Once stable from a surgical perspective, start oral anticoagulation with Eliquis  Currently on IV heparin  We will review his echocardiogram     2   Preoperative cardiovascular risk assessment - Overall low to intermediate risk from a cardiovascular standpoint  Can proceed with cholecystectomy  Follow telemetry closely for any recurrence of atrial fibrillation perioperatively  Continue beta-blocker perioperatively  3   History of CAD - This was diagnosed in [place], which he received prior stents  An echocardiogram has been ordered we will review this  He follows with a cardiologist at the [place] in [facility]  Subjective:   Patient seen and examined  No significant events overnight  Patient converted back to sinus rhythm  Asymptomatic from a cardiac standpoint  Still has some abdominal pain  Asked to evaluate from a preoperative risk standpoint, as patient is going to have a cholecystectomy  Objective:     Vitals: Blood pressure 135/63, pulse 89, temperature (!) 97 4 °F (36 3 °C), temperature source Oral, resp  rate 20, height 5' 8" (1 727 m), weight 92 kg (202 lb 13 2 oz), SpO2 99 %  , Body mass index is 30 84 kg/m² ,   Orthostatic Blood Pressures      Most Recent Value   Blood Pressure  135/63 filed at 02/06/2021 1518   Patient Position - Orthostatic VS  Sitting filed at 02/06/2021 1762 Intake/Output Summary (Last 24 hours) at 2/6/2021 1734  Last data filed at 2/6/2021 1646  Gross per 24 hour   Intake 2749 07 ml   Output 1650 ml   Net 1099 07 ml       No significant arrhythmias seen on telemetry review  Sinus rhythm        Physical Exam:    GEN: Jenny Frias appears well, alert and oriented x 3, pleasant and cooperative   HEENT: pupils equal, round, and reactive to light; extraocular muscles intact  NECK: supple, no carotid bruits   HEART: regular rhythm, normal S1 and S2, no murmurs, clicks, gallops or rubs   LUNGS: clear to auscultation bilaterally; no wheezes, rales, or rhonchi   ABDOMEN: normal bowel sounds, soft, + tenderness  EXTREMITIES: peripheral pulses normal; no clubbing, cyanosis, or edema  NEURO: no focal findings   SKIN: normal without suspicious lesions on exposed skin    Medications:      Current Facility-Administered Medications:     acetaminophen (TYLENOL) tablet 650 mg, 650 mg, Oral, Q6H PRN, Carrolyn Merchant, CRNP, 650 mg at 02/06/21 1113    diltiazem (CARDIZEM) 125 mg in sodium chloride 0 9 % 125 mL infusion, 1-15 mg/hr, Intravenous, Titrated, Kavin Manzo MD, Stopped at 02/06/21 0600    heparin (porcine) 25,000 units in 0 45% NaCl 250 mL infusion (premix), 18 Units/kg/hr (Order-Specific), Intravenous, Titrated, Carrolyn Merchant, CRNP, Last Rate: 8 1 mL/hr at 02/06/21 1552, 9 Units/kg/hr at 02/06/21 1552    heparin (porcine) injection 3,600 Units, 3,600 Units, Intravenous, Q1H PRN, Carrolyn Merchant, CRNP    heparin (porcine) injection 7,200 Units, 7,200 Units, Intravenous, Q1H PRN, Carrolyn Merchant, CRNP    levalbuterol [facility]) inhalation solution 1 25 mg, 1 25 mg, Nebulization, Q6H PRN, Kavin Manzo MD, 1 25 mg at 02/05/21 1727    metoprolol (LOPRESSOR) injection 5 mg, 5 mg, Intravenous, Q6H PRN, Kavin Manzo MD    metoprolol tartrate (LOPRESSOR) tablet 25 mg, 25 mg, Oral, Q12H [address], Kavin Manzo MD, 25 mg at 02/06/21 1025    morphine injection 2 mg, 2 mg, Intravenous, Q4H PRN, Angelika Wu MD    piperacillin-tazobactam (ZOSYN) 3 375 g in sodium chloride 0 9 % 100 mL IVPB, 3 375 g, Intravenous, Q6H, OLAF Marie, Stopped at 02/06/21 1424    sodium chloride 0 9 % infusion, 75 mL/hr, Intravenous, Continuous, Angelika Wu MD, Last Rate: 75 mL/hr at 02/06/21 1025, 75 mL/hr at 02/06/21 1025    sodium chloride 0 9 % inhalation solution 3 mL, 3 mL, Nebulization, Q6H PRN, Angelika Wu MD, 3 mL at 02/05/21 1727     Labs & Results:    Results from last 7 days   Lab Units 02/05/21  1223   TROPONIN I ng/mL <0 02     Results from last 7 days   Lab Units 02/06/21  0604 02/05/21  1223   WBC Thousand/uL 12 36* 14 00*   HEMOGLOBIN g/dL 14 0 16 1   HEMATOCRIT % 44 4 49 2   PLATELETS Thousands/uL 159 186     Results from last 7 days   Lab Units 02/06/21  0604   TRIGLYCERIDES mg/dL 110   HDL mg/dL 47     Results from last 7 days   Lab Units 02/06/21  0604 02/05/21  1223   POTASSIUM mmol/L 3 6 3 7   CHLORIDE mmol/L 102 96*   CO2 mmol/L 21 25   BUN mg/dL 21 23   CREATININE mg/dL 1 12 1 13   CALCIUM mg/dL 8 1* 8 9   ALK PHOS U/L 64 74   ALT U/L 23 22   AST U/L 20 19     Results from last 7 days   Lab Units 02/06/21  1351 02/06/21  0604 02/05/21  2228 02/05/21  1223   INR   --   --   --  1 19   PTT seconds 117* 194* >210* 36     Results from last 7 days   Lab Units 02/06/21  0604   MAGNESIUM mg/dL 2 4         EKG personally reviewed by Nonnie Landau, MD   Atrial fibrillation with rapid ventricular response and right bundle branch block  Most recent ECG shows sinus rhythm with right bundle branch block  Counseling / Coordination of Care  Total floor / unit time spent today 25 minutes  Greater than 50% of total time was spent with the patient and / or family counseling and / or coordination of care

## 2021-02-06 NOTE — ASSESSMENT & PLAN NOTE
· Presented to the ER on 02/05 with fevers, chills, abdominal pain x2 days  · WBC count 14 on arrival  · CT scan showed distended gallbladder  · RUQ US "Sludge in the gallbladder which is distended and demonstrates wall thickening"  · Keep NPO  · Surgery consulted, continue zosyn

## 2021-02-07 LAB
ALBUMIN SERPL BCP-MCNC: 2.3 G/DL (ref 3.5–5)
ALP SERPL-CCNC: 64 U/L (ref 46–116)
ALT SERPL W P-5'-P-CCNC: 31 U/L (ref 12–78)
ANION GAP SERPL CALCULATED.3IONS-SCNC: 9 MMOL/L (ref 4–13)
APTT PPP: 79 SECONDS (ref 23–37)
AST SERPL W P-5'-P-CCNC: 30 U/L (ref 5–45)
ATRIAL RATE: 80 BPM
BASOPHILS # BLD AUTO: 0.03 THOUSANDS/ΜL (ref 0–0.1)
BASOPHILS NFR BLD AUTO: 0 % (ref 0–1)
BILIRUB SERPL-MCNC: 1.8 MG/DL (ref 0.2–1)
BUN SERPL-MCNC: 17 MG/DL (ref 5–25)
CALCIUM ALBUM COR SERPL-MCNC: 9.2 MG/DL (ref 8.3–10.1)
CALCIUM SERPL-MCNC: 7.8 MG/DL (ref 8.3–10.1)
CHLORIDE SERPL-SCNC: 103 MMOL/L (ref 100–108)
CO2 SERPL-SCNC: 22 MMOL/L (ref 21–32)
CREAT SERPL-MCNC: 0.91 MG/DL (ref 0.6–1.3)
EOSINOPHIL # BLD AUTO: 0.14 THOUSAND/ΜL (ref 0–0.61)
EOSINOPHIL NFR BLD AUTO: 2 % (ref 0–6)
ERYTHROCYTE [DISTWIDTH] IN BLOOD BY AUTOMATED COUNT: 13.2 % (ref 11.6–15.1)
GFR SERPL CREATININE-BSD FRML MDRD: 81 ML/MIN/1.73SQ M
GLUCOSE SERPL-MCNC: 107 MG/DL (ref 65–140)
HCT VFR BLD AUTO: 39.3 % (ref 36.5–49.3)
HGB BLD-MCNC: 12.9 G/DL (ref 12–17)
IMM GRANULOCYTES # BLD AUTO: 0.02 THOUSAND/UL (ref 0–0.2)
IMM GRANULOCYTES NFR BLD AUTO: 0 % (ref 0–2)
LYMPHOCYTES # BLD AUTO: 2.49 THOUSANDS/ΜL (ref 0.6–4.47)
LYMPHOCYTES NFR BLD AUTO: 30 % (ref 14–44)
MCH RBC QN AUTO: 32.4 PG (ref 26.8–34.3)
MCHC RBC AUTO-ENTMCNC: 32.8 G/DL (ref 31.4–37.4)
MCV RBC AUTO: 99 FL (ref 82–98)
MONOCYTES # BLD AUTO: 1.16 THOUSAND/ΜL (ref 0.17–1.22)
MONOCYTES NFR BLD AUTO: 14 % (ref 4–12)
NEUTROPHILS # BLD AUTO: 4.6 THOUSANDS/ΜL (ref 1.85–7.62)
NEUTS SEG NFR BLD AUTO: 54 % (ref 43–75)
NRBC BLD AUTO-RTO: 0 /100 WBCS
P AXIS: 66 DEGREES
PLATELET # BLD AUTO: 155 THOUSANDS/UL (ref 149–390)
PMV BLD AUTO: 10.7 FL (ref 8.9–12.7)
POTASSIUM SERPL-SCNC: 3.9 MMOL/L (ref 3.5–5.3)
PR INTERVAL: 168 MS
PROT SERPL-MCNC: 6 G/DL (ref 6.4–8.2)
QRS AXIS: 93 DEGREES
QRSD INTERVAL: 146 MS
QT INTERVAL: 466 MS
QTC INTERVAL: 537 MS
RBC # BLD AUTO: 3.98 MILLION/UL (ref 3.88–5.62)
SODIUM SERPL-SCNC: 134 MMOL/L (ref 136–145)
T WAVE AXIS: -45 DEGREES
VENTRICULAR RATE: 80 BPM
WBC # BLD AUTO: 8.44 THOUSAND/UL (ref 4.31–10.16)

## 2021-02-07 PROCEDURE — 85730 THROMBOPLASTIN TIME PARTIAL: CPT | Performed by: INTERNAL MEDICINE

## 2021-02-07 PROCEDURE — 93010 ELECTROCARDIOGRAM REPORT: CPT | Performed by: INTERNAL MEDICINE

## 2021-02-07 PROCEDURE — 80053 COMPREHEN METABOLIC PANEL: CPT | Performed by: INTERNAL MEDICINE

## 2021-02-07 PROCEDURE — 94760 N-INVAS EAR/PLS OXIMETRY 1: CPT

## 2021-02-07 PROCEDURE — 85025 COMPLETE CBC W/AUTO DIFF WBC: CPT | Performed by: INTERNAL MEDICINE

## 2021-02-07 PROCEDURE — 99233 SBSQ HOSP IP/OBS HIGH 50: CPT | Performed by: SURGERY

## 2021-02-07 PROCEDURE — 99233 SBSQ HOSP IP/OBS HIGH 50: CPT | Performed by: INTERNAL MEDICINE

## 2021-02-07 RX ORDER — POTASSIUM CHLORIDE 20 MEQ/1
40 TABLET, EXTENDED RELEASE ORAL ONCE
Status: COMPLETED | OUTPATIENT
Start: 2021-02-07 | End: 2021-02-07

## 2021-02-07 RX ADMIN — HEPARIN SODIUM 9 UNITS/KG/HR: 10000 INJECTION, SOLUTION INTRAVENOUS at 17:46

## 2021-02-07 RX ADMIN — METOROPROLOL TARTRATE 5 MG: 5 INJECTION, SOLUTION INTRAVENOUS at 05:05

## 2021-02-07 RX ADMIN — METOPROLOL TARTRATE 75 MG: 25 TABLET ORAL at 20:10

## 2021-02-07 RX ADMIN — METOPROLOL TARTRATE 50 MG: 50 TABLET, FILM COATED ORAL at 08:41

## 2021-02-07 RX ADMIN — SODIUM CHLORIDE 75 ML/HR: 0.9 INJECTION, SOLUTION INTRAVENOUS at 00:37

## 2021-02-07 RX ADMIN — PIPERACILLIN AND TAZOBACTAM 3.38 G: 3; .375 INJECTION, POWDER, LYOPHILIZED, FOR SOLUTION INTRAVENOUS at 13:53

## 2021-02-07 RX ADMIN — POTASSIUM CHLORIDE 40 MEQ: 1500 TABLET, EXTENDED RELEASE ORAL at 01:37

## 2021-02-07 RX ADMIN — PIPERACILLIN AND TAZOBACTAM 3.38 G: 3; .375 INJECTION, POWDER, LYOPHILIZED, FOR SOLUTION INTRAVENOUS at 01:37

## 2021-02-07 RX ADMIN — PIPERACILLIN AND TAZOBACTAM 3.38 G: 3; .375 INJECTION, POWDER, LYOPHILIZED, FOR SOLUTION INTRAVENOUS at 08:41

## 2021-02-07 RX ADMIN — SODIUM CHLORIDE 75 ML/HR: 0.9 INJECTION, SOLUTION INTRAVENOUS at 13:54

## 2021-02-07 RX ADMIN — PIPERACILLIN AND TAZOBACTAM 3.38 G: 3; .375 INJECTION, POWDER, LYOPHILIZED, FOR SOLUTION INTRAVENOUS at 20:10

## 2021-02-07 NOTE — PROGRESS NOTES
Progress Note - Esmer Verduzco 1945, 66 y o  male MRN: [id number]    Unit/Bed#: -01 Encounter: [id number]    Primary Care Provider: No primary care provider on file     Date and time admitted to hospital: 2/5/2021 11:40 AM        * Right upper quadrant abdominal pain  Assessment & Plan  · Likely acute cholecystitis  · Presented to the ER on 02/05 with fevers, chills, abdominal pain x2 days  · WBC count 14 on arrival  · CT scan showed distended gallbladder  · RUQ US "Sludge in the gallbladder which is distended and demonstrates wall thickening"  · Surgery consulted - NPO at midnight possible lab ivon vs perc ivon tube today 2/7  · Continue zosyn    PAF (paroxysmal atrial fibrillation) ([facility] [address] )  Assessment & Plan  · Presented with new onset rapid AFib with heart rates 160s to 170s  · Received Cardizem bolus and drip in the ER as well as IVF bolus  · Patient takes Lopressor home  · Cardiology consult pending - Metoprolol increased to 50 mg Q12  · Cont Heparin gtt - transition to Eliquis once stable from surgical perspective  · Converted to NSR 2/6  · Overnight 2/6 freq PAC's and PVC's    SIRS (systemic inflammatory response syndrome) ([facility] )  Assessment & Plan  · Patient presented with fever 100 6, tachycardia 148, WBC 14, procall 2 33  · Suspected source gallbladder  · Received Zosyn in the ER  · Procalcitonin 2 3 > 5 3  · Blood cultures - 1/2 GNR  · Repeat BC pending  · CT scan showed distended gallbladder - right upper quadrant ultrasound pending  · RUQ US as above  · Surgery consult as above  · Trend WBC/fever curve  · Cont Zosyn  · Will need source control prior to determining length of ABX coverage    Hypertension  Assessment & Plan  · Currently normotensive  · Hold Lopressor     Hyponatremia  Assessment & Plan  · Na 132 on admission  · Improved post IVF  · Mild, trend     Dysphagia  Assessment & Plan  · Hx of esophageal stricture requiring esophageal dilation  · Cont to monitor    Gram-negative bacteremia  Assessment & Plan  · POA - likely due to acute cholecystitis  · BC 1/2 GNR, await further speciation before narrowing ABX  · Repeat BC pending  · Cont Zosyn  · Will need source control    VTE Pharmacologic Prophylaxis:   Pharmacologic: Heparin Drip  Mechanical VTE Prophylaxis in Place: Yes    Patient Centered Rounds: I have performed bedside rounds with nursing staff today  Discussions with Specialists or Other Care Team Provider: general surgery    Education and Discussions with Family / Patient: Afib and anticoagulation, surgical intervention for gallbladder    Time Spent for Care: 30 minutes  More than 50% of total time spent on counseling and coordination of care as described above  Current Length of Stay: 2 day(s)    Current Patient Status: Inpatient   Certification Statement: The patient will continue to require additional inpatient hospital stay due to acute cholecystitis surgery vs per drain today    Discharge Plan: not stable at this time    Code Status: Level 1 - Full Code      Subjective:   "My stomach hurts a little bit  Yeah I think I'm prob bloated  I trust everyone here to do whatever to get me better "    Objective:     Vitals:   Temp (24hrs), Av °F (37 2 °C), Min:97 4 °F (36 3 °C), Max:100 7 °F (38 2 °C)    Temp:  [97 4 °F (36 3 °C)-100 7 °F (38 2 °C)] 98 °F (36 7 °C)  HR:  [] 92  Resp:  [20-26] 20  BP: (128-152)/(59-77) 148/70  SpO2:  [93 %-99 %] 95 %  Body mass index is 30 84 kg/m²  Input and Output Summary (last 24 hours): Intake/Output Summary (Last 24 hours) at 2021 0428  Last data filed at 2021 0207  Gross per 24 hour   Intake 2978 94 ml   Output 900 ml   Net 2078 94 ml       Physical Exam:     Physical Exam  Constitutional:       General: He is awake  Appearance: He is well-developed  He is ill-appearing  Cardiovascular:      Rate and Rhythm: Normal rate and regular rhythm  Pulses: Normal pulses  Heart sounds: No murmur   No friction rub  No gallop  Pulmonary:      Effort: Pulmonary effort is normal  No respiratory distress  Breath sounds: No wheezing, rhonchi or rales  Abdominal:      General: Abdomen is flat  Bowel sounds are normal  There is distension  Palpations: Abdomen is soft  Tenderness: There is abdominal tenderness  Musculoskeletal:      Right lower leg: No edema  Left lower leg: No edema  Skin:     General: Skin is warm and dry  Capillary Refill: Capillary refill takes less than 2 seconds  Neurological:      General: No focal deficit present  Mental Status: He is alert and oriented to person, place, and time  Additional Data:     Labs:    Results from last 7 days   Lab Units 02/07/21  0344   WBC Thousand/uL 8 44   HEMOGLOBIN g/dL 12 9   HEMATOCRIT % 39 3   PLATELETS Thousands/uL 155   NEUTROS PCT % 54   LYMPHS PCT % 30   MONOS PCT % 14*   EOS PCT % 2     Results from last 7 days   Lab Units 02/07/21  0344   SODIUM mmol/L 134*   POTASSIUM mmol/L 3 9   CHLORIDE mmol/L 103   CO2 mmol/L 22   BUN mg/dL 17   CREATININE mg/dL 0 91   ANION GAP mmol/L 9   CALCIUM mg/dL 7 8*   ALBUMIN g/dL 2 3*   TOTAL BILIRUBIN mg/dL 1 80*   ALK PHOS U/L 64   ALT U/L 31   AST U/L 30   GLUCOSE RANDOM mg/dL 107     Results from last 7 days   Lab Units 02/05/21  1223   INR  1 19             Results from last 7 days   Lab Units 02/06/21  0604 02/05/21  1223   LACTIC ACID mmol/L  --  1 6   PROCALCITONIN ng/ml 5 34* 2 33*           * I Have Reviewed All Lab Data Listed Above  * Additional Pertinent Lab Tests Reviewed: All Labs Within Last 24 Hours Reviewed    Imaging:    Imaging Reports Reviewed Today Include: CT a/p, US gallbladder  Imaging Personally Reviewed by Myself Includes:  none    Recent Cultures (last 7 days):     Results from last 7 days   Lab Units 02/06/21  1103 02/06/21  1057 02/05/21  1223   BLOOD CULTURE  Received in Microbiology Lab  Culture in Progress  Received in Microbiology Lab  Culture in Progress  No Growth at 24 hrs  GRAM STAIN RESULT   --   --  Gram negative rods*       Last 24 Hours Medication List:   Current Facility-Administered Medications   Medication Dose Route Frequency Provider Last Rate    acetaminophen  650 mg Oral Q6H PRN OLAF Davis      heparin (porcine)  18 Units/kg/hr (Order-Specific) Intravenous Titrated OLAF Davis 9 Units/kg/hr (02/06/21 1552)    heparin (porcine)  3,600 Units Intravenous Q1H PRN OLAF Davis      heparin (porcine)  7,200 Units Intravenous Q1H PRN OLAF Davis      levalbuterol  1 25 mg Nebulization Q6H PRN Matilda Slaughter MD      metoprolol  5 mg Intravenous Q6H PRN Matilda Slaughter MD      metoprolol tartrate  50 mg Oral Q12H [address] Osiel Coreas MD      morphine injection  2 mg Intravenous Q4H PRN Matilda Slaughter MD      piperacillin-tazobactam  3 375 g Intravenous Q6H OLAF Davis Stopped (02/07/21 1292)    sodium chloride  75 mL/hr Intravenous Continuous Matilda Slaughter MD 75 mL/hr (02/07/21 0037)    sodium chloride  3 mL Nebulization Q6H PRN Matilda Slaughter MD          Today, Patient Was Seen By: OLAF Addison    ** Please Note: Dictation voice to text software may have been used in the creation of this document   **

## 2021-02-07 NOTE — ASSESSMENT & PLAN NOTE
· Presented with new onset rapid AFib with heart rates 160s to 170s  · Received Cardizem bolus and drip in the ER as well as IVF bolus  · Patient takes Lopressor home  · Cardiology consult pending - Metoprolol increased to 50 mg Q12  · Cont Heparin gtt - transition to Eliquis once stable from surgical perspective  · Converted to NSR 2/6  · Overnight 2/6 freq PAC's and PVC's

## 2021-02-07 NOTE — ASSESSMENT & PLAN NOTE
· Patient presented with fever 100 6, tachycardia 148, WBC 14, procall 2 33  · Suspected source gallbladder  · Received Zosyn in the ER  · Procalcitonin 2 3 > 5 3  · Blood cultures - 1/2 GNR  · Repeat BC pending  · CT scan showed distended gallbladder - right upper quadrant ultrasound pending  · RUQ US as above  · Surgery consult as above  · Trend WBC/fever curve  · Cont Zosyn  · Will need source control prior to determining length of ABX coverage

## 2021-02-07 NOTE — ASSESSMENT & PLAN NOTE
· Likely acute cholecystitis  · Presented to the ER on 02/05 with fevers, chills, abdominal pain x2 days  · WBC count 14 on arrival  · CT scan showed distended gallbladder  · RUQ US "Sludge in the gallbladder which is distended and demonstrates wall thickening"  · Surgery consulted - NPO at midnight possible lab ivon vs perc ivon tube today 2/7  · Continue zosyn

## 2021-02-07 NOTE — ASSESSMENT & PLAN NOTE
· POA - likely due to acute cholecystitis  · BC 1/2 GNR, await further speciation before narrowing ABX  · Repeat BC pending  · Cont Zosyn  · Will need source control

## 2021-02-08 ENCOUNTER — ANESTHESIA EVENT (INPATIENT)
Dept: PERIOP | Facility: HOSPITAL | Age: 76
DRG: 854 | End: 2021-02-08
Payer: COMMERCIAL

## 2021-02-08 ENCOUNTER — APPOINTMENT (INPATIENT)
Dept: NON INVASIVE DIAGNOSTICS | Facility: HOSPITAL | Age: 76
DRG: 854 | End: 2021-02-08
Payer: COMMERCIAL

## 2021-02-08 ENCOUNTER — ANESTHESIA (INPATIENT)
Dept: PERIOP | Facility: HOSPITAL | Age: 76
DRG: 854 | End: 2021-02-08
Payer: COMMERCIAL

## 2021-02-08 VITALS — HEART RATE: 78 BPM

## 2021-02-08 PROBLEM — A41.51 SEPSIS DUE TO ESCHERICHIA COLI WITHOUT ACUTE ORGAN DYSFUNCTION (HCC): Status: ACTIVE | Noted: 2021-02-05

## 2021-02-08 PROBLEM — K81.0 ACUTE CHOLECYSTITIS: Status: ACTIVE | Noted: 2021-02-05

## 2021-02-08 PROBLEM — I10 HYPERTENSION: Chronic | Status: ACTIVE | Noted: 2021-02-05

## 2021-02-08 PROBLEM — E87.1 HYPONATREMIA: Status: RESOLVED | Noted: 2021-02-06 | Resolved: 2021-02-08

## 2021-02-08 LAB
ALBUMIN SERPL BCP-MCNC: 2.3 G/DL (ref 3.5–5)
ALP SERPL-CCNC: 69 U/L (ref 46–116)
ALT SERPL W P-5'-P-CCNC: 36 U/L (ref 12–78)
AMYLASE FLD QL: 10 U/L
ANION GAP SERPL CALCULATED.3IONS-SCNC: 12 MMOL/L (ref 4–13)
APTT PPP: 42 SECONDS (ref 23–37)
AST SERPL W P-5'-P-CCNC: 41 U/L (ref 5–45)
BASOPHILS # BLD AUTO: 0.02 THOUSANDS/ΜL (ref 0–0.1)
BASOPHILS NFR BLD AUTO: 0 % (ref 0–1)
BILIRUB SERPL-MCNC: 0.7 MG/DL (ref 0.2–1)
BUN SERPL-MCNC: 13 MG/DL (ref 5–25)
CALCIUM ALBUM COR SERPL-MCNC: 9 MG/DL (ref 8.3–10.1)
CALCIUM SERPL-MCNC: 7.6 MG/DL (ref 8.3–10.1)
CHLORIDE SERPL-SCNC: 106 MMOL/L (ref 100–108)
CO2 SERPL-SCNC: 20 MMOL/L (ref 21–32)
CREAT SERPL-MCNC: 0.87 MG/DL (ref 0.6–1.3)
EOSINOPHIL # BLD AUTO: 0.23 THOUSAND/ΜL (ref 0–0.61)
EOSINOPHIL NFR BLD AUTO: 4 % (ref 0–6)
ERYTHROCYTE [DISTWIDTH] IN BLOOD BY AUTOMATED COUNT: 13.1 % (ref 11.6–15.1)
GFR SERPL CREATININE-BSD FRML MDRD: 83 ML/MIN/1.73SQ M
GLUCOSE SERPL-MCNC: 87 MG/DL (ref 65–140)
HCT VFR BLD AUTO: 37.1 % (ref 36.5–49.3)
HGB BLD-MCNC: 12.2 G/DL (ref 12–17)
IMM GRANULOCYTES # BLD AUTO: 0.02 THOUSAND/UL (ref 0–0.2)
IMM GRANULOCYTES NFR BLD AUTO: 0 % (ref 0–2)
LYMPHOCYTES # BLD AUTO: 2.5 THOUSANDS/ΜL (ref 0.6–4.47)
LYMPHOCYTES NFR BLD AUTO: 40 % (ref 14–44)
MAGNESIUM SERPL-MCNC: 1.7 MG/DL (ref 1.6–2.6)
MCH RBC QN AUTO: 32.4 PG (ref 26.8–34.3)
MCHC RBC AUTO-ENTMCNC: 32.9 G/DL (ref 31.4–37.4)
MCV RBC AUTO: 99 FL (ref 82–98)
MONOCYTES # BLD AUTO: 0.71 THOUSAND/ΜL (ref 0.17–1.22)
MONOCYTES NFR BLD AUTO: 11 % (ref 4–12)
NEUTROPHILS # BLD AUTO: 2.74 THOUSANDS/ΜL (ref 1.85–7.62)
NEUTS SEG NFR BLD AUTO: 45 % (ref 43–75)
NRBC BLD AUTO-RTO: 0 /100 WBCS
PLATELET # BLD AUTO: 171 THOUSANDS/UL (ref 149–390)
PMV BLD AUTO: 10.6 FL (ref 8.9–12.7)
POTASSIUM SERPL-SCNC: 3.8 MMOL/L (ref 3.5–5.3)
PROT SERPL-MCNC: 5.6 G/DL (ref 6.4–8.2)
RBC # BLD AUTO: 3.76 MILLION/UL (ref 3.88–5.62)
SODIUM SERPL-SCNC: 138 MMOL/L (ref 136–145)
WBC # BLD AUTO: 6.22 THOUSAND/UL (ref 4.31–10.16)

## 2021-02-08 PROCEDURE — 85730 THROMBOPLASTIN TIME PARTIAL: CPT | Performed by: INTERNAL MEDICINE

## 2021-02-08 PROCEDURE — 80053 COMPREHEN METABOLIC PANEL: CPT | Performed by: INTERNAL MEDICINE

## 2021-02-08 PROCEDURE — 93306 TTE W/DOPPLER COMPLETE: CPT | Performed by: INTERNAL MEDICINE

## 2021-02-08 PROCEDURE — 99024 POSTOP FOLLOW-UP VISIT: CPT | Performed by: SURGERY

## 2021-02-08 PROCEDURE — C8929 TTE W OR WO FOL WCON,DOPPLER: HCPCS

## 2021-02-08 PROCEDURE — 87070 CULTURE OTHR SPECIMN AEROBIC: CPT | Performed by: SURGERY

## 2021-02-08 PROCEDURE — 85025 COMPLETE CBC W/AUTO DIFF WBC: CPT | Performed by: INTERNAL MEDICINE

## 2021-02-08 PROCEDURE — 83735 ASSAY OF MAGNESIUM: CPT | Performed by: INTERNAL MEDICINE

## 2021-02-08 PROCEDURE — 87205 SMEAR GRAM STAIN: CPT | Performed by: SURGERY

## 2021-02-08 PROCEDURE — 99232 SBSQ HOSP IP/OBS MODERATE 35: CPT | Performed by: INTERNAL MEDICINE

## 2021-02-08 PROCEDURE — 87075 CULTR BACTERIA EXCEPT BLOOD: CPT | Performed by: SURGERY

## 2021-02-08 PROCEDURE — 0FT44ZZ RESECTION OF GALLBLADDER, PERCUTANEOUS ENDOSCOPIC APPROACH: ICD-10-PCS | Performed by: SURGERY

## 2021-02-08 PROCEDURE — 88304 TISSUE EXAM BY PATHOLOGIST: CPT | Performed by: CLINICAL MEDICAL LABORATORY

## 2021-02-08 PROCEDURE — 82150 ASSAY OF AMYLASE: CPT | Performed by: SURGERY

## 2021-02-08 PROCEDURE — 47562 LAPAROSCOPIC CHOLECYSTECTOMY: CPT | Performed by: PHYSICIAN ASSISTANT

## 2021-02-08 PROCEDURE — 47562 LAPAROSCOPIC CHOLECYSTECTOMY: CPT | Performed by: SURGERY

## 2021-02-08 PROCEDURE — 87186 SC STD MICRODIL/AGAR DIL: CPT | Performed by: SURGERY

## 2021-02-08 PROCEDURE — 87077 CULTURE AEROBIC IDENTIFY: CPT | Performed by: SURGERY

## 2021-02-08 RX ORDER — BUPIVACAINE HYDROCHLORIDE AND EPINEPHRINE 5; 5 MG/ML; UG/ML
INJECTION, SOLUTION EPIDURAL; INTRACAUDAL; PERINEURAL AS NEEDED
Status: DISCONTINUED | OUTPATIENT
Start: 2021-02-08 | End: 2021-02-08 | Stop reason: HOSPADM

## 2021-02-08 RX ORDER — ONDANSETRON 2 MG/ML
4 INJECTION INTRAMUSCULAR; INTRAVENOUS ONCE AS NEEDED
Status: DISCONTINUED | OUTPATIENT
Start: 2021-02-08 | End: 2021-02-08 | Stop reason: HOSPADM

## 2021-02-08 RX ORDER — PROPOFOL 10 MG/ML
INJECTION, EMULSION INTRAVENOUS AS NEEDED
Status: DISCONTINUED | OUTPATIENT
Start: 2021-02-08 | End: 2021-02-08

## 2021-02-08 RX ORDER — LIDOCAINE HYDROCHLORIDE 10 MG/ML
INJECTION, SOLUTION EPIDURAL; INFILTRATION; INTRACAUDAL; PERINEURAL AS NEEDED
Status: DISCONTINUED | OUTPATIENT
Start: 2021-02-08 | End: 2021-02-08

## 2021-02-08 RX ORDER — FENTANYL CITRATE 50 UG/ML
INJECTION, SOLUTION INTRAMUSCULAR; INTRAVENOUS AS NEEDED
Status: DISCONTINUED | OUTPATIENT
Start: 2021-02-08 | End: 2021-02-08

## 2021-02-08 RX ORDER — DEXAMETHASONE SODIUM PHOSPHATE 4 MG/ML
INJECTION, SOLUTION INTRA-ARTICULAR; INTRALESIONAL; INTRAMUSCULAR; INTRAVENOUS; SOFT TISSUE AS NEEDED
Status: DISCONTINUED | OUTPATIENT
Start: 2021-02-08 | End: 2021-02-08

## 2021-02-08 RX ORDER — SODIUM CHLORIDE 9 MG/ML
75 INJECTION, SOLUTION INTRAVENOUS CONTINUOUS
Status: DISCONTINUED | OUTPATIENT
Start: 2021-02-08 | End: 2021-02-09

## 2021-02-08 RX ORDER — SODIUM CHLORIDE, SODIUM LACTATE, POTASSIUM CHLORIDE, CALCIUM CHLORIDE 600; 310; 30; 20 MG/100ML; MG/100ML; MG/100ML; MG/100ML
INJECTION, SOLUTION INTRAVENOUS CONTINUOUS PRN
Status: DISCONTINUED | OUTPATIENT
Start: 2021-02-08 | End: 2021-02-08

## 2021-02-08 RX ORDER — ESMOLOL HYDROCHLORIDE 10 MG/ML
INJECTION INTRAVENOUS AS NEEDED
Status: DISCONTINUED | OUTPATIENT
Start: 2021-02-08 | End: 2021-02-08

## 2021-02-08 RX ORDER — HYDROMORPHONE HCL/PF 1 MG/ML
0.5 SYRINGE (ML) INJECTION
Status: DISCONTINUED | OUTPATIENT
Start: 2021-02-08 | End: 2021-02-08 | Stop reason: HOSPADM

## 2021-02-08 RX ORDER — HYDROMORPHONE HCL/PF 1 MG/ML
0.5 SYRINGE (ML) INJECTION
Status: DISCONTINUED | OUTPATIENT
Start: 2021-02-08 | End: 2021-02-11 | Stop reason: HOSPADM

## 2021-02-08 RX ORDER — FENTANYL CITRATE/PF 50 MCG/ML
25 SYRINGE (ML) INJECTION
Status: DISCONTINUED | OUTPATIENT
Start: 2021-02-08 | End: 2021-02-08 | Stop reason: HOSPADM

## 2021-02-08 RX ORDER — OXYCODONE HYDROCHLORIDE 5 MG/1
10 TABLET ORAL EVERY 6 HOURS PRN
Status: DISCONTINUED | OUTPATIENT
Start: 2021-02-08 | End: 2021-02-11 | Stop reason: HOSPADM

## 2021-02-08 RX ORDER — ROCURONIUM BROMIDE 10 MG/ML
INJECTION, SOLUTION INTRAVENOUS AS NEEDED
Status: DISCONTINUED | OUTPATIENT
Start: 2021-02-08 | End: 2021-02-08

## 2021-02-08 RX ORDER — ONDANSETRON 2 MG/ML
INJECTION INTRAMUSCULAR; INTRAVENOUS AS NEEDED
Status: DISCONTINUED | OUTPATIENT
Start: 2021-02-08 | End: 2021-02-08

## 2021-02-08 RX ORDER — NEOSTIGMINE METHYLSULFATE 1 MG/ML
INJECTION INTRAVENOUS AS NEEDED
Status: DISCONTINUED | OUTPATIENT
Start: 2021-02-08 | End: 2021-02-08

## 2021-02-08 RX ORDER — OXYCODONE HYDROCHLORIDE 5 MG/1
5 TABLET ORAL EVERY 4 HOURS PRN
Status: DISCONTINUED | OUTPATIENT
Start: 2021-02-08 | End: 2021-02-11 | Stop reason: HOSPADM

## 2021-02-08 RX ORDER — LISINOPRIL 5 MG/1
5 TABLET ORAL DAILY
Status: DISCONTINUED | OUTPATIENT
Start: 2021-02-09 | End: 2021-02-09

## 2021-02-08 RX ORDER — KETOROLAC TROMETHAMINE 30 MG/ML
INJECTION, SOLUTION INTRAMUSCULAR; INTRAVENOUS AS NEEDED
Status: DISCONTINUED | OUTPATIENT
Start: 2021-02-08 | End: 2021-02-08

## 2021-02-08 RX ORDER — LABETALOL 20 MG/4 ML (5 MG/ML) INTRAVENOUS SYRINGE
AS NEEDED
Status: DISCONTINUED | OUTPATIENT
Start: 2021-02-08 | End: 2021-02-08

## 2021-02-08 RX ORDER — PANTOPRAZOLE SODIUM 40 MG/1
40 TABLET, DELAYED RELEASE ORAL
Status: DISCONTINUED | OUTPATIENT
Start: 2021-02-09 | End: 2021-02-09

## 2021-02-08 RX ORDER — GLYCOPYRROLATE 0.2 MG/ML
INJECTION INTRAMUSCULAR; INTRAVENOUS AS NEEDED
Status: DISCONTINUED | OUTPATIENT
Start: 2021-02-08 | End: 2021-02-08

## 2021-02-08 RX ORDER — SODIUM CHLORIDE, SODIUM LACTATE, POTASSIUM CHLORIDE, CALCIUM CHLORIDE 600; 310; 30; 20 MG/100ML; MG/100ML; MG/100ML; MG/100ML
75 INJECTION, SOLUTION INTRAVENOUS CONTINUOUS
Status: DISCONTINUED | OUTPATIENT
Start: 2021-02-08 | End: 2021-02-08

## 2021-02-08 RX ADMIN — PROPOFOL 130 MG: 10 INJECTION, EMULSION INTRAVENOUS at 10:14

## 2021-02-08 RX ADMIN — ESMOLOL HYDROCHLORIDE 5 MG: 10 INJECTION, SOLUTION INTRAVENOUS at 11:36

## 2021-02-08 RX ADMIN — METOPROLOL TARTRATE 75 MG: 25 TABLET ORAL at 20:53

## 2021-02-08 RX ADMIN — NEOSTIGMINE METHYLSULFATE 2.5 MG: 1 INJECTION INTRAVENOUS at 11:36

## 2021-02-08 RX ADMIN — FENTANYL CITRATE 25 MCG: 50 INJECTION, SOLUTION INTRAMUSCULAR; INTRAVENOUS at 12:27

## 2021-02-08 RX ADMIN — ONDANSETRON 4 MG: 2 INJECTION INTRAMUSCULAR; INTRAVENOUS at 11:23

## 2021-02-08 RX ADMIN — PIPERACILLIN AND TAZOBACTAM 3.38 G: 4; .5 INJECTION, POWDER, FOR SOLUTION INTRAVENOUS at 10:10

## 2021-02-08 RX ADMIN — SODIUM CHLORIDE, SODIUM LACTATE, POTASSIUM CHLORIDE, AND CALCIUM CHLORIDE: .6; .31; .03; .02 INJECTION, SOLUTION INTRAVENOUS at 09:57

## 2021-02-08 RX ADMIN — PIPERACILLIN AND TAZOBACTAM 3.38 G: 3; .375 INJECTION, POWDER, LYOPHILIZED, FOR SOLUTION INTRAVENOUS at 02:20

## 2021-02-08 RX ADMIN — ESMOLOL HYDROCHLORIDE 2.5 MG: 10 INJECTION, SOLUTION INTRAVENOUS at 10:36

## 2021-02-08 RX ADMIN — LIDOCAINE HYDROCHLORIDE 15 MG: 10 INJECTION, SOLUTION EPIDURAL; INFILTRATION; INTRACAUDAL; PERINEURAL at 10:14

## 2021-02-08 RX ADMIN — OXYCODONE HYDROCHLORIDE 10 MG: 5 TABLET ORAL at 13:50

## 2021-02-08 RX ADMIN — DEXAMETHASONE SODIUM PHOSPHATE 4 MG: 4 INJECTION, SOLUTION INTRAMUSCULAR; INTRAVENOUS at 10:44

## 2021-02-08 RX ADMIN — FENTANYL CITRATE 25 MCG: 50 INJECTION, SOLUTION INTRAMUSCULAR; INTRAVENOUS at 12:16

## 2021-02-08 RX ADMIN — METOPROLOL TARTRATE 75 MG: 25 TABLET ORAL at 08:41

## 2021-02-08 RX ADMIN — FENTANYL CITRATE 25 MCG: 50 INJECTION, SOLUTION INTRAMUSCULAR; INTRAVENOUS at 11:36

## 2021-02-08 RX ADMIN — ROCURONIUM BROMIDE 7.5 MG: 10 INJECTION, SOLUTION INTRAVENOUS at 10:59

## 2021-02-08 RX ADMIN — LABETALOL 20 MG/4 ML (5 MG/ML) INTRAVENOUS SYRINGE 2.5 MG: at 10:43

## 2021-02-08 RX ADMIN — KETOROLAC TROMETHAMINE 15 MG: 30 INJECTION, SOLUTION INTRAMUSCULAR; INTRAVENOUS at 11:23

## 2021-02-08 RX ADMIN — GLYCOPYRROLATE 0.1 MG: 0.2 INJECTION, SOLUTION INTRAMUSCULAR; INTRAVENOUS at 10:13

## 2021-02-08 RX ADMIN — PROPOFOL 25 MG: 10 INJECTION, EMULSION INTRAVENOUS at 11:38

## 2021-02-08 RX ADMIN — PIPERACILLIN AND TAZOBACTAM 3.38 G: 3; .375 INJECTION, POWDER, LYOPHILIZED, FOR SOLUTION INTRAVENOUS at 20:53

## 2021-02-08 RX ADMIN — GLYCOPYRROLATE 0.3 MG: 0.2 INJECTION, SOLUTION INTRAMUSCULAR; INTRAVENOUS at 11:36

## 2021-02-08 RX ADMIN — FENTANYL CITRATE 25 MCG: 50 INJECTION, SOLUTION INTRAMUSCULAR; INTRAVENOUS at 10:36

## 2021-02-08 RX ADMIN — PERFLUTREN 0.8 ML/MIN: 6.52 INJECTION, SUSPENSION INTRAVENOUS at 09:06

## 2021-02-08 RX ADMIN — FENTANYL CITRATE 25 MCG: 50 INJECTION, SOLUTION INTRAMUSCULAR; INTRAVENOUS at 10:29

## 2021-02-08 RX ADMIN — ROCURONIUM BROMIDE 35 MG: 10 INJECTION, SOLUTION INTRAVENOUS at 10:15

## 2021-02-08 RX ADMIN — PIPERACILLIN AND TAZOBACTAM 3.38 G: 3; .375 INJECTION, POWDER, LYOPHILIZED, FOR SOLUTION INTRAVENOUS at 13:50

## 2021-02-08 RX ADMIN — FENTANYL CITRATE 25 MCG: 50 INJECTION, SOLUTION INTRAMUSCULAR; INTRAVENOUS at 10:12

## 2021-02-08 RX ADMIN — PROPOFOL 30 MG: 10 INJECTION, EMULSION INTRAVENOUS at 10:29

## 2021-02-08 RX ADMIN — PIPERACILLIN AND TAZOBACTAM 3.38 G: 3; .375 INJECTION, POWDER, LYOPHILIZED, FOR SOLUTION INTRAVENOUS at 08:30

## 2021-02-08 RX ADMIN — LABETALOL 20 MG/4 ML (5 MG/ML) INTRAVENOUS SYRINGE 2.5 MG: at 10:45

## 2021-02-08 RX ADMIN — LABETALOL 20 MG/4 ML (5 MG/ML) INTRAVENOUS SYRINGE 2.5 MG: at 11:01

## 2021-02-08 NOTE — PROGRESS NOTES
Progress Note - Toribio Nguyễn 1945, 66 y o  male MRN: [id number]    Unit/Bed#: OR POOL Encounter: [id number]    Primary Care Provider: No primary care provider on file     Date and time admitted to hospital: 2/5/2021 11:40 AM        Sepsis due to Escherichia coli without acute organ dysfunction (HCC)  Assessment & Plan  · Sepsis, POA, as evidenced by fever 100 6, tachycardia 148, WBC 14, procal 2 33 secondary to acute cholecystitis  · On IV Zosyn  · Procalcitonin 2 3 > 5 3  · Blood cultures - 1/2 E Coli  · Repeat BC - NG till date  · CT scan showed distended gallbladder - right upper quadrant ultrasound pending  · Surgery on board - for surgery today      Gram-negative bacteremia  Assessment & Plan  · E coli bacteremia, POA, secondary to acute cholecystitis  · BC 1/2 E Coli  · Repeat BC Negative till date  · Cont Zosyn  · Hopeful for source control following lap ivon today, determine ABX length following surgery  · For surgery today    Hyponatremia  Assessment & Plan  · Na 132 on admission  · Resolved with IVFs  · Na 138    Dysphagia  Assessment & Plan  · Hx of esophageal stricture requiring esophageal dilation  · Cont to monitor     Hypertension  Assessment & Plan  · Currently normotensive  · On Lopressor      PAF (paroxysmal atrial fibrillation) ([facility], [place] [address] )  Assessment & Plan  · Presented with new onset rapid AFib with heart rates 160s to 170s  · Received Cardizem bolus and drip in the ER as well as IVF bolus  · Patient takes Lopressor home  · Cardiology consult - Metoprolol increased to 50 mg Q12 on 2/6  · Freq PAC's and PVC's at times on tele despite increase in home Metoprolol, increased to 75 mg Q12 on 2/7  · Cont Heparin gtt - on hold for the OR today  · transition to Eliquis once stable from surgical perspective  · Converted to NSR 2/6  · Follow ECHO results    * Right upper quadrant abdominal pain  Assessment & Plan  · Secondary to acute cholecystitis  · Presented to the ER on 02/05 with fevers, chills, abdominal pain x2 days  · WBC count 14 on arrival  · CT scan showed distended gallbladder  · RUQ US "Sludge in the gallbladder which is distended and demonstrates wall thickening"  · Surgery consulted - NPO at midnight for lab ivon   · Continue zosyn      VTE Pharmacologic Prophylaxis:   Pharmacologic: Pharmacologic VTE Prophylaxis contraindicated due to for surgery today  Mechanical VTE Prophylaxis in Place: Yes    Patient Centered Rounds: I have performed bedside rounds with nursing staff today  Discussions with Specialists or Other Care Team Provider: Cm    Education and Discussions with Family / Patient: patient declined family update    Time Spent for Care: 30 minutes  More than 50% of total time spent on counseling and coordination of care as described above  Current Length of Stay: 3 day(s)    Current Patient Status: Inpatient   Certification Statement: The patient will continue to require additional inpatient hospital stay due to as above    Discharge Plan: 2-3 days    Code Status: Level 1 - Full Code      Subjective:   No overnight events, no new complaints - still with RUQ abd pain    Objective:     Vitals:   Temp (24hrs), Av °F (36 7 °C), Min:97 6 °F (36 4 °C), Max:98 9 °F (37 2 °C)    Temp:  [97 6 °F (36 4 °C)-98 9 °F (37 2 °C)] 97 9 °F (36 6 °C)  HR:  [] 77  Resp:  [17-22] 18  BP: (135-173)/(64-94) 165/94  SpO2:  [95 %-99 %] 97 %  Body mass index is 30 84 kg/m²  Input and Output Summary (last 24 hours): Intake/Output Summary (Last 24 hours) at 2021 1136  Last data filed at 2021 1124  Gross per 24 hour   Intake 4141 18 ml   Output 1575 ml   Net 2566 18 ml       Physical Exam:     Physical Exam  Vitals signs and nursing note reviewed  Constitutional:       General: He is not in acute distress  Appearance: Normal appearance  He is obese  He is not ill-appearing or toxic-appearing  Cardiovascular:      Rate and Rhythm: Normal rate and regular rhythm  Pulses: Normal pulses  Heart sounds: No murmur  Pulmonary:      Effort: Pulmonary effort is normal  No respiratory distress  Breath sounds: Normal breath sounds  No wheezing, rhonchi or rales  Chest:      Chest wall: No tenderness  Abdominal:      General: There is no distension  Palpations: Abdomen is soft  Tenderness: There is abdominal tenderness (RUQ)  There is no right CVA tenderness, left CVA tenderness or guarding  Musculoskeletal: Normal range of motion  General: No swelling, tenderness or deformity  Right lower leg: No edema  Left lower leg: No edema  Skin:     General: Skin is warm and dry  Capillary Refill: Capillary refill takes less than 2 seconds  Coloration: Skin is not jaundiced or pale  Findings: No bruising or lesion  Neurological:      General: No focal deficit present  Mental Status: He is alert  Mental status is at baseline  Cranial Nerves: No cranial nerve deficit  Psychiatric:         Mood and Affect: Mood normal          Thought Content: Thought content normal            Additional Data:     Labs:    Results from last 7 days   Lab Units 02/08/21  0226   WBC Thousand/uL 6 22   HEMOGLOBIN g/dL 12 2   HEMATOCRIT % 37 1   PLATELETS Thousands/uL 171   NEUTROS PCT % 45   LYMPHS PCT % 40   MONOS PCT % 11   EOS PCT % 4     Results from last 7 days   Lab Units 02/08/21  0226   SODIUM mmol/L 138   POTASSIUM mmol/L 3 8   CHLORIDE mmol/L 106   CO2 mmol/L 20*   BUN mg/dL 13   CREATININE mg/dL 0 87   ANION GAP mmol/L 12   CALCIUM mg/dL 7 6*   ALBUMIN g/dL 2 3*   TOTAL BILIRUBIN mg/dL 0 70   ALK PHOS U/L 69   ALT U/L 36   AST U/L 41   GLUCOSE RANDOM mg/dL 87     Results from last 7 days   Lab Units 02/05/21  1223   INR  1 19             Results from last 7 days   Lab Units 02/06/21  0604 02/05/21  1223   LACTIC ACID mmol/L  --  1 6   PROCALCITONIN ng/ml 5 34* 2 33*           * I Have Reviewed All Lab Data Listed Above    * Additional Pertinent Lab Tests Reviewed: All Labs Within Last 24 Hours Reviewed    Imaging:    Imaging Reports Reviewed Today Include:   Imaging Personally Reviewed by Myself Includes:      Recent Cultures (last 7 days):     Results from last 7 days   Lab Units 02/06/21  1103 02/06/21  1057 02/05/21  1223   BLOOD CULTURE  No Growth at 24 hrs  No Growth at 24 hrs  No Growth at 48 hrs    Escherichia coli*   GRAM STAIN RESULT   --   --  Gram negative rods*       Last 24 Hours Medication List:   Current Facility-Administered Medications   Medication Dose Route Frequency Provider Last Rate    [MAR Hold] acetaminophen  650 mg Oral Q6H PRN OLAF Lindsey      bupivacaine-epinephrine (PF)    PRN Heath Martinez MD      [facility] Hold] levalbuterol  1 25 mg Nebulization Q6H PRN Kera Caldwell MD      [facility] Hold] metoprolol  5 mg Intravenous Q6H PRN Kera Caldwell MD      [facility] Hold] metoprolol tartrate  75 mg Oral Q12H [address] OLAF Garcia      [facility] Hold] morphine injection  2 mg Intravenous Q4H PRN Kera Caldwell MD      [facility] Hold] piperacillin-tazobactam  3 375 g Intravenous Q6H OLAF Lindsey Stopped (02/08/21 0250)    sodium chloride  75 mL/hr Intravenous Continuous Kera Caldwell MD 75 mL/hr (02/07/21 1354)   Anju Kelsey [[facility] Hold] sodium chloride  3 mL Nebulization Q6H PRN Kera Caldwell MD       Facility-Administered Medications Ordered in Other Encounters   Medication Dose Route Frequency Provider Last Rate    dexamethasone    PRN Orient Plate, CRNA      esmolol    PRN Orient Plate, CRNA      fentanyl citrate (PF)   Intravenous PRN Orient Plate, CRNA      glycopyrrolate   Intravenous PRN Robert Plate, CRNA      ketorolac    PRN Robert Plate, CRNA      Labetalol HCl   Intravenous PRN Robert Plate, CRNA      lactated ringers    Continuous PRN Norfolk Settler, DO      lidocaine (PF)    PRN Robert Plate, CRNA      ondansetron    PRN Robert Plate, CRNA      piperacillin-tazobactam (ZOSYN) 4 5 g in sodium chloride 0 9% 100 mL IVPB    Continuous PRN Neal Osmar, CRNA      propofol   Intravenous PRN Neal Osmar, CRNA      ROCuronium    PRN Ángel Prasad, CRNA          Today, Patient Was Seen By: Andrey Soto MD    ** Please Note: Dictation voice to text software may have been used in the creation of this document   **

## 2021-02-08 NOTE — CASE MANAGEMENT
LOS: 3 days  Pt is not a documented bundle  Pt is not a 30 day readmission  Unplanned readmission score is 9 and green  Met with Pt  Pt presents AA&Ox3  Discussed role of , discharge planning, identifying help at home and discharge preference  Pt reports he lives with his SO [place] [address]  Pt reports he is independent with adls and ambulation  Pt [facility], has prescription plan and able to afford medications  Pt reports he also uses [employer] mail order for medications  Pt reports his older sister(Yumi) is POA and he has living will  Pt reports he drives and goes grocery shopping  Pt denies hx of VNA and SNF  Pt reports drug and alcohol use over 30 years ago  Pt reports his SO will help him at home if needed  Pt reports his PCP is Filomena Holt PA-C for [facility]  Pt reports his SO will transport him home and declines discharge needs  CM to follow

## 2021-02-08 NOTE — ASSESSMENT & PLAN NOTE
· Secondary to acute cholecystitis  · Presented to the ER on 02/05 with fevers, chills, abdominal pain x2 days  · WBC count 14 on arrival  · CT scan showed distended gallbladder  · RUQ US "Sludge in the gallbladder which is distended and demonstrates wall thickening"  · Surgery consulted - NPO at midnight for lab ivon 2/8  · Continue zosyn

## 2021-02-08 NOTE — PLAN OF CARE
Problem: Potential for Falls  Goal: Patient will remain free of falls  Description: INTERVENTIONS:  - Assess patient frequently for physical needs  -  Identify cognitive and physical deficits and behaviors that affect risk of falls    -  [place] fall precautions as indicated by assessment   - Educate patient/family on patient safety including physical limitations  - Instruct patient to call for assistance with activity based on assessment  - Modify environment to reduce risk of injury  - Consider OT/PT consult to assist with strengthening/mobility  Outcome: Progressing     Problem: CARDIOVASCULAR - ADULT  Goal: Maintains optimal cardiac output and hemodynamic stability  Description: INTERVENTIONS:  - Monitor I/O, vital signs and rhythm  - Monitor for S/S and trends of decreased cardiac output  - Administer and titrate ordered vasoactive medications to optimize hemodynamic stability  - Assess quality of pulses, skin color and temperature  - Assess for signs of decreased coronary artery perfusion  - Instruct patient to report change in severity of symptoms  Outcome: Progressing  Goal: Absence of cardiac dysrhythmias or at baseline rhythm  Description: INTERVENTIONS:  - Continuous cardiac monitoring, vital signs, obtain 12 lead EKG if ordered  - Administer antiarrhythmic and heart rate control medications as ordered  - Monitor electrolytes and administer replacement therapy as ordered  Outcome: Progressing     Problem: RESPIRATORY - ADULT  Goal: Achieves optimal ventilation and oxygenation  Description: INTERVENTIONS:  - Assess for changes in respiratory status  - Assess for changes in mentation and behavior  - Position to facilitate oxygenation and minimize respiratory effort  - Oxygen administered by appropriate delivery if ordered  - Initiate smoking cessation education as indicated  - Encourage broncho-pulmonary hygiene including cough, deep breathe, Incentive Spirometry  - Assess the need for suctioning and aspirate as needed  - Assess and instruct to report SOB or any respiratory difficulty  - Respiratory Therapy support as indicated  Outcome: Progressing     Problem: METABOLIC, FLUID AND ELECTROLYTES - ADULT  Goal: Electrolytes maintained within normal limits  Description: INTERVENTIONS:  - Monitor labs and assess patient for signs and symptoms of electrolyte imbalances  - Administer electrolyte replacement as ordered  - Monitor response to electrolyte replacements, including repeat lab results as appropriate  - Instruct patient on fluid and nutrition as appropriate  Outcome: Progressing  Goal: Fluid balance maintained  Description: INTERVENTIONS:  - Monitor labs   - Monitor I/O and WT  - Instruct patient on fluid and nutrition as appropriate  - Assess for signs & symptoms of volume excess or deficit  Outcome: Progressing     Problem: SAFETY ADULT  Goal: Maintain or return to baseline ADL function  Description: INTERVENTIONS:  -  Assess patient's ability to carry out ADLs; assess patient's baseline for ADL function and identify physical deficits which impact ability to perform ADLs (bathing, care of mouth/teeth, toileting, grooming, dressing, etc )  - Assess/evaluate cause of self-care deficits   - Assess range of motion  - Assess patient's mobility; develop plan if impaired  - Assess patient's need for assistive devices and provide as appropriate  - Encourage maximum independence but intervene and supervise when necessary  - Involve family in performance of ADLs  - Assess for home care needs following discharge   - Consider OT consult to assist with ADL evaluation and planning for discharge  - Provide patient education as appropriate  Outcome: Progressing     Problem: Prexisting or High Potential for Compromised Skin Integrity  Goal: Skin integrity is maintained or improved  Description: INTERVENTIONS:  - Identify patients at risk for skin breakdown  - Assess and monitor skin integrity  - Assess and monitor nutrition and hydration status  - Monitor labs   - Assess for incontinence   - Turn and reposition patient  - Assist with mobility/ambulation  - Relieve pressure over bony prominences  - Avoid friction and shearing  - Provide appropriate hygiene as needed including keeping skin clean and dry  - Evaluate need for skin moisturizer/barrier cream  - Collaborate with interdisciplinary team   - Patient/family teaching  - Consider wound care consult   Outcome: Progressing

## 2021-02-08 NOTE — ASSESSMENT & PLAN NOTE
· Sepsis, POA, as evidenced by fever 100 6, tachycardia 148, WBC 14, procal 2 33 secondary to acute cholecystitis  · On IV Zosyn  · Procalcitonin 2 3 > 5 3  · Blood cultures - 1/2 E Coli  · Repeat BC - NG till date  · CT scan showed distended gallbladder - right upper quadrant ultrasound pending  · Surgery on board - for surgery today

## 2021-02-08 NOTE — ASSESSMENT & PLAN NOTE
· Presented with new onset rapid AFib with heart rates 160s to 170s  · Received Cardizem bolus and drip in the ER as well as IVF bolus  · Patient takes Lopressor home  · Cardiology consult - Metoprolol increased to 50 mg Q12 on 2/6  · Freq PAC's and PVC's at times on tele despite increase in home Metoprolol, increased to 75 mg Q12 on 2/7  · Cont Heparin gtt - on hold for the OR today  · transition to Eliquis once stable from surgical perspective  · Converted to NSR 2/6  · Follow ECHO results

## 2021-02-08 NOTE — ANESTHESIA PREPROCEDURE EVALUATION
Procedure:  CHOLECYSTECTOMY LAPAROSCOPIC (N/A Abdomen)    Relevant Problems   CARDIO   (+) Hypertension   (+) PAF (paroxysmal atrial fibrillation) (HCC)      GI/HEPATIC   (+) Dysphagia        Physical Exam    Airway    Mallampati score: I  TM Distance: >3 FB  Neck ROM: full     Dental   upper dentures and lower dentures,     Cardiovascular  Cardiovascular exam normal    Pulmonary  Pulmonary exam normal     Other Findings        Anesthesia Plan  ASA Score- 3     Anesthesia Type- general with ASA Monitors  Additional Monitors:   Airway Plan: ETT  Plan Factors-    Chart reviewed  EKG reviewed  Existing labs reviewed  Patient summary reviewed  Patient is not a current smoker  Induction- intravenous  Postoperative Plan- Plan for postoperative opioid use  Informed Consent- Anesthetic plan and risks discussed with patient

## 2021-02-08 NOTE — ASSESSMENT & PLAN NOTE
· E coli bacteremia, POA, secondary to acute cholecystitis  · BC 1/2 E Coli  · Repeat BC Negative till date  · Cont Zosyn  · Hopeful for source control following lap ivon today, determine ABX length following surgery  · For surgery today

## 2021-02-08 NOTE — PROGRESS NOTES
Progress Note - General Surgery   Yasmany Mathis 66 y o  male MRN: [id number]  Unit/Bed#: -01 Encounter: [id number]    Assessment:  65 y/o male admitted with bacteremia and new onset afib probable cholecystitis with sludge, probable colangitis   · With significant improvement in abdominal pain this morning  · Tolerated clears yesterday , npo since midnight  · Blood culture from 2/6 shows no growth (1/4 positive, but negative since 2/5)  · Leukocytosis resolved  · Tbili improved 0 70    Plan:  · Patient is low-intermediate risk for cholecystectomy  Continue perioperative telemetry to monitor for afib  Echo to be done this am prior to surgery  · Continue Zosyn   · Plan for laparoscopic cholecystectomy today  · Hold heparin drip at midnight prior to OR       Subjective/Objective   Chief Complaint: I am feeling better     Subjective: No acute overnight events  Pt feels much better this am  No cp, nausea, vomiting    Objective:     Blood pressure (!) 173/64, pulse 81, temperature 97 9 °F (36 6 °C), temperature source Oral, resp  rate 20, height 5' 8" (1 727 m), weight 92 kg (202 lb 13 2 oz), SpO2 96 %  ,Body mass index is 30 84 kg/m²  Intake/Output Summary (Last 24 hours) at 2/8/2021 0911  Last data filed at 2/8/2021 1931  Gross per 24 hour   Intake 3992 43 ml   Output 1350 ml   Net 2642 43 ml       Invasive Devices     Peripheral Intravenous Line            Peripheral IV 02/05/21 Left Antecubital 2 days    Peripheral IV 02/05/21 Right Antecubital 2 days    Peripheral IV 02/05/21 Right Hand 2 days                Physical Exam  Constitutional:       Appearance: Normal appearance  HENT:      Head: Normocephalic and atraumatic  Nose: Nose normal       Mouth/Throat:      Mouth: Mucous membranes are moist    Eyes:      Pupils: Pupils are equal, round, and reactive to light  Neck:      Musculoskeletal: Neck supple  Cardiovascular:      Rate and Rhythm: Normal rate  Rhythm irregular        Comments: , NSR with frequent ectopy on telemetry   Pulmonary:      Effort: Pulmonary effort is normal    Abdominal:      General: Abdomen is flat  Bowel sounds are normal  There is no distension  Palpations: Abdomen is soft  There is no mass  Tenderness: There is no guarding  Hernia: A hernia is present  Comments: Reducible, non-tender supraumbilical hernia   Minimal ttp   Skin:     General: Skin is warm and dry  Neurological:      Mental Status: He is alert and oriented to person, place, and time     Psychiatric:         Mood and Affect: Mood normal          Behavior: Behavior normal          Lab, Imaging and other studies:  CBC:   Lab Results   Component Value Date    WBC 6 22 02/08/2021    HGB 12 2 02/08/2021    HCT 37 1 02/08/2021    MCV 99 (H) 02/08/2021     02/08/2021    MCH 32 4 02/08/2021    MCHC 32 9 02/08/2021    RDW 13 1 02/08/2021    MPV 10 6 02/08/2021    NRBC 0 02/08/2021   , CMP:   Lab Results   Component Value Date    SODIUM 138 02/08/2021    K 3 8 02/08/2021     02/08/2021    CO2 20 (L) 02/08/2021    BUN 13 02/08/2021    CREATININE 0 87 02/08/2021    CALCIUM 7 6 (L) 02/08/2021    AST 41 02/08/2021    ALT 36 02/08/2021    ALKPHOS 69 02/08/2021    EGFR 83 02/08/2021     VTE Pharmacologic Prophylaxis: Heparin  VTE Mechanical Prophylaxis: sequential compression device

## 2021-02-08 NOTE — PROGRESS NOTES
General Cardiology   Progress Note -  [facility]   Khadar Mills 66 y o  male MRN: [id number]    Unit/Bed#: -01 Encounter: [id number]    Assessment:      Afib with RVR  · afib with RVR present on admit to ED 2/4/2021; new diagnosis for patient  · Rate control with IV Cardizem bolus and drip  · Converted to NSR 2/6 at 0400 and IV Cardizem d/c'd  · [id number] score of 5; heparin drip for anticoagulation given findings of possible cholecystitis on CT of abdomen and potential need for surgery  · Once OK'd by surgery, would change heparin drip to Eliquis 5 mg BID for [address]   · Tele reveals: sinus rhythm with PVC's and PAC'S  · Metoprolol tartrate 50 mg BID    cardiomyopathy  · TTE from today; EF 40% with moderate diffuse hypokinesis;  Wall thickness was moderately increased; grade 2 DD; RV systolic pressure was moderately increased; moderate TR  · Tachy induced vs  Ischemic etiology; patient with hx of CAD in 2018 but no recent ischemic workup  · Follows with a Cardiologist at the [facility] in [place] [address]  · GDMT: metoprolol tartrate 50 mg BID and will add lisinopril 5 mg QD     SIRS/gram negative bacteremia  · Meets SIRS criteria on admit with fever 100 6, sinus tachycardia and leukocytosis  · CT scan showed distended gallbladder - right upper quadrant ultrasound pending  · Zosyn per primary team   · Blood cultures 1/2 ;positive for Ecoli  · Management per primary team  · likley driving afib with RVR     Hx of CAD  ·  patient states he had stents placed in 2018 in [place]  ·  he does not remember what vessel/s  ·  patient is unaware of past medical history; poor historian  ·  he states he takes 81 mg of aspirin daily  ·  he states he follows with a cardiologist within the [facility] in [facility]     Essential HTN  ·  's-130's  · metoprolol tartrate 50 mg BID       Right upper quadrant pain  · Patient notes RUQ pain that began Wednesday   · Also notes fever/chills and fatigue today  · CT scan showed distended gallbladder - right upper quadrant ultrasound pending  · S/p lab ivon today        Plan/Recommendations:  · [id number] score of 5; heparin drip for anticoagulation given findings of possible cholecystitis on CT of abdomen and potential need for surgery  · Once OK'd by surgery, would change heparin drip to Eliquis 5 mg BID for OAC   · Continue metoprolol tartrate 50 mg BID  · Add lisinopril 5 mg QD for GDMT for cardiomyopathy  __________________________________________________________________________    Subjective  Patient notes abdominal tenderness as well as some mild nausea but offers no acute cardiac complaints  Review of Systems   Constitution: Negative for chills, fever and malaise/fatigue  HENT: Negative for congestion  Cardiovascular: Negative for chest pain, dyspnea on exertion, leg swelling, orthopnea and palpitations  Respiratory: Negative for cough, shortness of breath (no SOB at rest) and wheezing  Endocrine: Negative  Hematologic/Lymphatic: Negative  Skin: Negative  Musculoskeletal: Negative  Gastrointestinal: Positive for abdominal pain and nausea  Negative for bloating and vomiting  Genitourinary: Negative  Neurological: Negative for dizziness and light-headedness  Psychiatric/Behavioral: Negative  All other systems reviewed and are negative  Objective:   Vitals: Blood pressure 133/61, pulse 67, temperature 97 6 °F (36 4 °C), temperature source Oral, resp  rate 20, height 5' 8" (1 727 m), weight 92 kg (202 lb 13 2 oz), SpO2 95 %  ,     Wt Readings from Last 3 Encounters:   21 92 kg (202 lb 13 2 oz)        Lab Results   Component Value Date    CREATININE 0 87 2021    CREATININE 0 91 2021    CREATININE 1 12 2021         Body mass index is 30 84 kg/m²  ,     Systolic (30VNF), DFJ:641 , Min:108 , XF     Diastolic (02TVF), JXP:80, Min:50, Max:94          Intake/Output Summary (Last 24 hours) at 2021 1422  Last data filed at 2/8/2021 1137  Gross per 24 hour   Intake 3607 12 ml   Output 1575 ml   Net 2032 12 ml     Weight (last 2 days)     None            Telemetry Review: sinus rhythm with rates in the 70's and occasional PVC's/PAC's        Physical Exam  Vitals signs reviewed  Constitutional:       General: He is not in acute distress  HENT:      Head: Normocephalic and atraumatic  Mouth/Throat:      Mouth: Mucous membranes are moist    Neck:      Musculoskeletal: Normal range of motion and neck supple  Cardiovascular:      Rate and Rhythm: Normal rate and regular rhythm  Occasional extrasystoles are present  Heart sounds: Normal heart sounds, S1 normal and S2 normal  No murmur  Pulmonary:      Effort: Pulmonary effort is normal  No respiratory distress  Breath sounds: Normal breath sounds  Abdominal:      General: Bowel sounds are normal  There is no distension  Palpations: Abdomen is soft  Tenderness: There is abdominal tenderness  Musculoskeletal: Normal range of motion  Right lower leg: No edema  Left lower leg: No edema  Skin:     General: Skin is warm and dry  Neurological:      Mental Status: He is alert and oriented to person, place, and time     Psychiatric:         Mood and Affect: Mood normal          LABORATORY RESULTS  Results from last 7 days   Lab Units 02/05/21  1223   TROPONIN I ng/mL <0 02     CBC with diff:   Results from last 7 days   Lab Units 02/08/21 0226 02/07/21  0344 02/06/21  0604 02/05/21  1223   WBC Thousand/uL 6 22 8 44 12 36* 14 00*   HEMOGLOBIN g/dL 12 2 12 9 14 0 16 1   HEMATOCRIT % 37 1 39 3 44 4 49 2   MCV fL 99* 99* 103* 98   PLATELETS Thousands/uL 171 155 159 186   MCH pg 32 4 32 4 32 3 32 0   MCHC g/dL 32 9 32 8 31 5 32 7   RDW % 13 1 13 2 13 2 13 0   MPV fL 10 6 10 7 10 5 10 8   NRBC AUTO /100 WBCs 0 0 0 0       CMP:  Results from last 7 days   Lab Units 02/08/21 0226 02/07/21  0344 02/06/21  0604 02/05/21  1223   POTASSIUM mmol/L 3 8 3 9 3 6 3  7   CHLORIDE mmol/L 106 103 102 96*   CO2 mmol/L 20* 22 21 25   BUN mg/dL 13 17 21 23   CREATININE mg/dL 0 87 0 91 1 12 1 13   CALCIUM mg/dL 7 6* 7 8* 8 1* 8 9   AST U/L 41 30 20 19   ALT U/L 36 31 23 22   ALK PHOS U/L 69 64 64 74   EGFR ml/min/1 73sq m 83 81 63 62       BMP:  Results from last 7 days   Lab Units 21  0226 21  0344 21  0604 21  1223   POTASSIUM mmol/L 3 8 3 9 3 6 3 7   CHLORIDE mmol/L 106 103 102 96*   CO2 mmol/L 20* 22 21 25   BUN mg/dL 13 17 21 23   CREATININE mg/dL 0 87 0 91 1 12 1 13   CALCIUM mg/dL 7 6* 7 8* 8 1* 8 9       Lab Results   Component Value Date    NTBNP 1,311 (H) 2021             Results from last 7 days   Lab Units 21  0226 21  0604   MAGNESIUM mg/dL 1 7 2 4                 Results from last 7 days   Lab Units 21  1223   TSH 3RD GENERATON uIU/mL 2 993       Results from last 7 days   Lab Units 21  1223   INR  1 19       Lipid Profile:   No results found for: CHOL  Lab Results   Component Value Date    HDL 47 2021     Lab Results   Component Value Date    LDLCALC 62 2021     Lab Results   Component Value Date    TRIG 110 2021       Cardiac testing:   Results for orders placed during the hospital encounter of 21   Echo complete with contrast if indicated    [address]    Transthoracic Echocardiogram  2D, M-mode, Doppler, and Color Doppler    Study date:  2021    Patient: Aamir Horan  MR number: [id number]  Account number: [de-identified]  : 1945  Age: 66 years  Gender: Male  Status: Inpatient  Location: [address] [place] [address]  Height: 68 in  Weight: 201 5 lb  BP: 140/ 71 mmHg    Indications: Atrial Fibrillation    Diagnoses: I48 0 - Atrial fibrillation    Sonographer:  Pablito Arzola RDCS  Referring Physician:  Fatou Elliott MD  Group:  [facility]  Interpreting Physician:  Yolande Lees, MD    SUMMARY    LEFT VENTRICLE:  Systolic function was moderately reduced by visual assessment  Ejection fraction was estimated to be 40 %  There was moderate diffuse hypokinesis  Wall thickness was moderately increased  Features were consistent with a pseudonormal left ventricular filling pattern, with concomitant abnormal relaxation and increased filling pressure (grade 2 diastolic dysfunction)  RIGHT VENTRICLE:  Systolic pressure was moderately increased  Estimated peak pressure was 50 mmHg  MITRAL VALVE:  There was mild regurgitation  TRICUSPID VALVE:  There was mild to moderate regurgitation  IVC, HEPATIC VEINS:  The inferior vena cava was mildly dilated  HISTORY: PRIOR HISTORY: Hypertension    PROCEDURE: The study was performed in the [facility]  This was a routine study  The transthoracic approach was used  The study included complete 2D imaging, M-mode, complete spectral Doppler, and color Doppler  The heart rate was  104 bpm, at the start of the study  Images were obtained from the parasternal, apical, subcostal, and suprasternal notch acoustic windows  Intravenous contrast (  8 ml Definity in NSS) was administered to opacify the left ventricle  Echocardiographic views were limited due to decreased penetration and lung interference  This was a technically difficult study  LEFT VENTRICLE: Size was normal  Systolic function was moderately reduced by visual assessment  Ejection fraction was estimated to be 40 %  There was moderate diffuse hypokinesis  Wall thickness was moderately increased  DOPPLER: The ratio  of early ventricular filling to atrial contraction velocities was within the normal range  Features were consistent with a pseudonormal left ventricular filling pattern, with concomitant abnormal relaxation and increased filling pressure  (grade 2 diastolic dysfunction)      RIGHT VENTRICLE: The size was normal  Systolic function was normal  DOPPLER: Systolic pressure was moderately increased  Estimated peak pressure was 50 mmHg  LEFT ATRIUM: Size was normal     RIGHT ATRIUM: Size was normal     MITRAL VALVE: Valve structure was normal  There was normal leaflet separation  DOPPLER: The transmitral velocity was within the normal range  There was no evidence for stenosis  There was mild regurgitation  AORTIC VALVE: The valve was trileaflet  Leaflets exhibited normal thickness, mild calcification, and normal cuspal separation  DOPPLER: Transaortic velocity was within the normal range  There was no evidence for stenosis  There was no  regurgitation  TRICUSPID VALVE: The valve structure was normal  There was normal leaflet separation  DOPPLER: The transtricuspid velocity was within the normal range  There was no evidence for stenosis  There was mild to moderate regurgitation  PULMONIC VALVE: Leaflets exhibited normal thickness, no calcification, and normal cuspal separation  DOPPLER: The transpulmonic velocity was within the normal range  There was no regurgitation  PERICARDIUM: There was no pericardial effusion  AORTA: The root exhibited normal size  SYSTEMIC VEINS: IVC: The inferior vena cava was mildly dilated      SYSTEM MEASUREMENT TABLES    2D  %FS: 32 53 %  Ao Diam: 2 77 cm  Ao asc: 3 15 cm  EDV(Teich): 119 87 ml  EF(Teich): 60 62 %  ESV(Teich): 47 21 ml  IVC: 22 31 mm  IVSd: 1 49 cm  LA Area: 16 04 cm2  LA Diam: 3 97 cm  LVEDV MOD A4C: 88 92 ml  LVEF MOD A4C: 45 34 %  LVESV MOD A4C: 48 6 ml  LVIDd: 5 03 cm  LVIDs: 3 39 cm  LVLd A4C: 7 19 cm  LVLs A4C: 6 3 cm  LVOT Diam: 2 09 cm  LVPWd: 1 35 cm  RA Area: 16 08 cm2  RVIDd: 3 82 cm  SV MOD A4C: 40 32 ml  SV(Teich): 72 66 ml    CW  MR VTI: 187 93 cm  MR Vmax: 5 79 m/s  MR Vmean: 4 22 m/s  MR maxP 97 mmHg  MR meanP 78 mmHg  TR Vmax: 3 47 m/s  TR maxP 1 mmHg    MM  TAPSE: 1 61 cm    PW  E' Sept: 0 11 m/s  E/E' Sept: 7 41  MV A [place]: 0 5 m/s  MV Dec [place]: 3 8 m/s2  MV DecT: 207 04 ms  MV E Cain: 0 79 m/s  MV E/A Ratio: 1 57  MV PHT: 60 04 ms  MVA By PHT: 3 66 cm2    [facility]    Prepared and electronically signed by    Kim Gamez MD  Signed 08-Feb-2021 09:34:10       No results found for this or any previous visit  No results found for this or any previous visit  No procedure found  No results found for this or any previous visit  Meds/Allergies   current meds:   Current Facility-Administered Medications   Medication Dose Route Frequency    acetaminophen (TYLENOL) tablet 650 mg  650 mg Oral Q6H PRN    HYDROmorphone (DILAUDID) injection 0 5 mg  0 5 mg Intravenous Q3H PRN    levalbuterol (XOPENEX) inhalation solution 1 25 mg  1 25 mg Nebulization Q6H PRN    metoprolol (LOPRESSOR) injection 5 mg  5 mg Intravenous Q6H PRN    metoprolol tartrate (LOPRESSOR) tablet 75 mg  75 mg Oral Q12H [address]    oxyCODONE (ROXICODONE) IR tablet 10 mg  10 mg Oral Q6H PRN    oxyCODONE (ROXICODONE) IR tablet 5 mg  5 mg Oral Q4H PRN    [START ON 2/9/2021] pantoprazole (PROTONIX) EC tablet 40 mg  40 mg Oral Early Morning    piperacillin-tazobactam (ZOSYN) 3 375 g in sodium chloride 0 9 % 100 mL IVPB  3 375 g Intravenous Q6H    sodium chloride 0 9 % infusion  75 mL/hr Intravenous Continuous    sodium chloride 0 9 % inhalation solution 3 mL  3 mL Nebulization Q6H PRN     Medications Prior to Admission   Medication    aspirin (ECOTRIN LOW STRENGTH) 81 mg EC tablet    METOPROLOL SUCCINATE PO    omeprazole (PriLOSEC) 20 mg delayed release capsule       sodium chloride, 75 mL/hr        Counseling / Coordination of Care  Total floor / unit time spent today 20 minutes  Greater than 50% of total time was spent with the patient and / or family counseling and / or coordination of care  ** Please Note: Dragon 360 Dictation voice to text software may have been used in the creation of this document   **

## 2021-02-08 NOTE — ANESTHESIA POSTPROCEDURE EVALUATION
Post-Op Assessment Note    CV Status:  Stable  Pain Score: 0    Pain management: adequate     Mental Status:  Alert, awake and sleepy   Hydration Status:  Euvolemic   PONV Controlled:  Controlled   Airway Patency:  Patent      Post Op Vitals Reviewed: Yes      Staff: Anesthesiologist, CRNA   Comments: left hearing aid remains in        No complications documented      BP      Temp      Pulse     Resp      SpO2

## 2021-02-08 NOTE — INTERIM OP NOTE
CHOLECYSTECTOMY LAPAROSCOPIC  Postoperative Note  PATIENT NAME: Kena Moscoso  : 1945  MRN: [id number]  [facility] OR ROOM 01    Surgery Date: 2021    Preop Diagnosis:  Gallbladder disease [K82 9]    Post-Op Diagnosis Codes:     * Gallbladder disease [K82 9]    Procedure(s) (LRB):  CHOLECYSTECTOMY LAPAROSCOPIC (N/A)    Surgeon(s) and Role:     * Agustina Lyon MD - Primary     * Mitzi Browne PA-C - Assisting     * Petra Herndon PA-C - Assisting    Specimens:  ID Type Source Tests Collected by Time Destination   1 :  Tissue Gallbladder TISSUE Roxene Sox Agustina Lyon MD 2021 1132    A :  Body Fluid Gallbladder ANAEROBIC CULTURE AND GRAM STAIN Agustina Lyon MD 2021 1055    B :  Body Fluid Gallbladder BODY FLUID CULTURE AND GRAM STAIN Agustina Lyon MD 2021 1055    C :  Body Fluid Gallbladder AMYLASE, BODY FLUID Agustina Lyon MD 2021 1056        Estimated Blood Loss:   25 mL    Anesthesia Type:   General ETA    Findings:    Thick walled distended gallbladder  gallbladder decompressed with findings of bilious mildly purulent drainage, sent for culture  Oozing from liver bed  recommend holding heparin till tomorrow a m , may restart low-dose with no bolus  Okay to restart diet    Complications:   None    SIGNATURE: Mitzi Browne PA-C   DATE: 2021   TIME: 11:52 AM

## 2021-02-09 PROBLEM — I50.40 COMBINED SYSTOLIC AND DIASTOLIC CONGESTIVE HEART FAILURE (HCC): Status: ACTIVE | Noted: 2021-02-09

## 2021-02-09 LAB
ANION GAP SERPL CALCULATED.3IONS-SCNC: 8 MMOL/L (ref 4–13)
APTT PPP: 114 SECONDS (ref 23–37)
APTT PPP: 46 SECONDS (ref 23–37)
BUN SERPL-MCNC: 18 MG/DL (ref 5–25)
CALCIUM SERPL-MCNC: 8.2 MG/DL (ref 8.3–10.1)
CHLORIDE SERPL-SCNC: 105 MMOL/L (ref 100–108)
CO2 SERPL-SCNC: 24 MMOL/L (ref 21–32)
CREAT SERPL-MCNC: 0.88 MG/DL (ref 0.6–1.3)
ERYTHROCYTE [DISTWIDTH] IN BLOOD BY AUTOMATED COUNT: 12.7 % (ref 11.6–15.1)
GFR SERPL CREATININE-BSD FRML MDRD: 83 ML/MIN/1.73SQ M
GLUCOSE SERPL-MCNC: 122 MG/DL (ref 65–140)
HCT VFR BLD AUTO: 38 % (ref 36.5–49.3)
HGB BLD-MCNC: 12.2 G/DL (ref 12–17)
INR PPP: 1.14 (ref 0.84–1.19)
MAGNESIUM SERPL-MCNC: 1.9 MG/DL (ref 1.6–2.6)
MCH RBC QN AUTO: 32 PG (ref 26.8–34.3)
MCHC RBC AUTO-ENTMCNC: 32.1 G/DL (ref 31.4–37.4)
MCV RBC AUTO: 100 FL (ref 82–98)
PHOSPHATE SERPL-MCNC: 3.9 MG/DL (ref 2.3–4.1)
PLATELET # BLD AUTO: 201 THOUSANDS/UL (ref 149–390)
PMV BLD AUTO: 10.9 FL (ref 8.9–12.7)
POTASSIUM SERPL-SCNC: 4.7 MMOL/L (ref 3.5–5.3)
PROTHROMBIN TIME: 14.6 SECONDS (ref 11.6–14.5)
RBC # BLD AUTO: 3.81 MILLION/UL (ref 3.88–5.62)
SODIUM SERPL-SCNC: 137 MMOL/L (ref 136–145)
WBC # BLD AUTO: 8.48 THOUSAND/UL (ref 4.31–10.16)

## 2021-02-09 PROCEDURE — 80048 BASIC METABOLIC PNL TOTAL CA: CPT | Performed by: PHYSICIAN ASSISTANT

## 2021-02-09 PROCEDURE — 85730 THROMBOPLASTIN TIME PARTIAL: CPT | Performed by: INTERNAL MEDICINE

## 2021-02-09 PROCEDURE — 84100 ASSAY OF PHOSPHORUS: CPT | Performed by: PHYSICIAN ASSISTANT

## 2021-02-09 PROCEDURE — 99024 POSTOP FOLLOW-UP VISIT: CPT | Performed by: SURGERY

## 2021-02-09 PROCEDURE — 99232 SBSQ HOSP IP/OBS MODERATE 35: CPT | Performed by: INTERNAL MEDICINE

## 2021-02-09 PROCEDURE — 83735 ASSAY OF MAGNESIUM: CPT | Performed by: PHYSICIAN ASSISTANT

## 2021-02-09 PROCEDURE — 85027 COMPLETE CBC AUTOMATED: CPT | Performed by: PHYSICIAN ASSISTANT

## 2021-02-09 PROCEDURE — 85610 PROTHROMBIN TIME: CPT | Performed by: INTERNAL MEDICINE

## 2021-02-09 RX ORDER — HEPARIN SODIUM 10000 [USP'U]/100ML
3-20 INJECTION, SOLUTION INTRAVENOUS
Status: DISCONTINUED | OUTPATIENT
Start: 2021-02-09 | End: 2021-02-09

## 2021-02-09 RX ORDER — POLYETHYLENE GLYCOL 3350 17 G/17G
17 POWDER, FOR SOLUTION ORAL DAILY PRN
Status: DISCONTINUED | OUTPATIENT
Start: 2021-02-09 | End: 2021-02-11 | Stop reason: HOSPADM

## 2021-02-09 RX ORDER — BISACODYL 10 MG
10 SUPPOSITORY, RECTAL RECTAL ONCE
Status: COMPLETED | OUTPATIENT
Start: 2021-02-09 | End: 2021-02-09

## 2021-02-09 RX ORDER — HEPARIN SODIUM 1000 [USP'U]/ML
4000 INJECTION, SOLUTION INTRAVENOUS; SUBCUTANEOUS
Status: DISCONTINUED | OUTPATIENT
Start: 2021-02-09 | End: 2021-02-09

## 2021-02-09 RX ORDER — LISINOPRIL 5 MG/1
5 TABLET ORAL DAILY
Status: DISCONTINUED | OUTPATIENT
Start: 2021-02-09 | End: 2021-02-11 | Stop reason: HOSPADM

## 2021-02-09 RX ORDER — HEPARIN SODIUM 1000 [USP'U]/ML
4000 INJECTION, SOLUTION INTRAVENOUS; SUBCUTANEOUS
Status: DISCONTINUED | OUTPATIENT
Start: 2021-02-09 | End: 2021-02-10

## 2021-02-09 RX ORDER — POLYETHYLENE GLYCOL 3350 17 G/17G
17 POWDER, FOR SOLUTION ORAL DAILY
Status: DISCONTINUED | OUTPATIENT
Start: 2021-02-09 | End: 2021-02-09

## 2021-02-09 RX ORDER — HEPARIN SODIUM 1000 [USP'U]/ML
4000 INJECTION, SOLUTION INTRAVENOUS; SUBCUTANEOUS ONCE
Status: COMPLETED | OUTPATIENT
Start: 2021-02-09 | End: 2021-02-09

## 2021-02-09 RX ORDER — PANTOPRAZOLE SODIUM 40 MG/1
40 TABLET, DELAYED RELEASE ORAL
Status: DISCONTINUED | OUTPATIENT
Start: 2021-02-10 | End: 2021-02-11 | Stop reason: HOSPADM

## 2021-02-09 RX ORDER — HEPARIN SODIUM 10000 [USP'U]/100ML
3-20 INJECTION, SOLUTION INTRAVENOUS
Status: DISCONTINUED | OUTPATIENT
Start: 2021-02-09 | End: 2021-02-10

## 2021-02-09 RX ORDER — HEPARIN SODIUM 1000 [USP'U]/ML
2000 INJECTION, SOLUTION INTRAVENOUS; SUBCUTANEOUS
Status: DISCONTINUED | OUTPATIENT
Start: 2021-02-09 | End: 2021-02-09

## 2021-02-09 RX ORDER — HEPARIN SODIUM 1000 [USP'U]/ML
2000 INJECTION, SOLUTION INTRAVENOUS; SUBCUTANEOUS
Status: DISCONTINUED | OUTPATIENT
Start: 2021-02-09 | End: 2021-02-10

## 2021-02-09 RX ORDER — HEPARIN SODIUM 1000 [USP'U]/ML
4000 INJECTION, SOLUTION INTRAVENOUS; SUBCUTANEOUS ONCE
Status: DISCONTINUED | OUTPATIENT
Start: 2021-02-09 | End: 2021-02-10

## 2021-02-09 RX ADMIN — Medication 3.38 G: at 22:18

## 2021-02-09 RX ADMIN — METOPROLOL TARTRATE 75 MG: 25 TABLET ORAL at 22:21

## 2021-02-09 RX ADMIN — HEPARIN SODIUM 4000 UNITS: 1000 INJECTION INTRAVENOUS; SUBCUTANEOUS at 10:47

## 2021-02-09 RX ADMIN — LISINOPRIL 5 MG: 5 TABLET ORAL at 08:42

## 2021-02-09 RX ADMIN — POLYETHYLENE GLYCOL 3350 17 G: 17 POWDER, FOR SOLUTION ORAL at 10:24

## 2021-02-09 RX ADMIN — METOPROLOL TARTRATE 75 MG: 25 TABLET ORAL at 08:42

## 2021-02-09 RX ADMIN — PANTOPRAZOLE SODIUM 40 MG: 40 TABLET, DELAYED RELEASE ORAL at 06:31

## 2021-02-09 RX ADMIN — PIPERACILLIN AND TAZOBACTAM 3.38 G: 3; .375 INJECTION, POWDER, LYOPHILIZED, FOR SOLUTION INTRAVENOUS at 02:33

## 2021-02-09 RX ADMIN — OXYCODONE HYDROCHLORIDE 5 MG: 5 TABLET ORAL at 08:42

## 2021-02-09 RX ADMIN — HEPARIN SODIUM 11.1 UNITS/KG/HR: 10000 INJECTION, SOLUTION INTRAVENOUS at 10:47

## 2021-02-09 RX ADMIN — BISACODYL 10 MG: 10 SUPPOSITORY RECTAL at 19:56

## 2021-02-09 RX ADMIN — Medication 3.38 G: at 15:05

## 2021-02-09 RX ADMIN — PIPERACILLIN AND TAZOBACTAM 3.38 G: 3; .375 INJECTION, POWDER, LYOPHILIZED, FOR SOLUTION INTRAVENOUS at 07:35

## 2021-02-09 NOTE — PROGRESS NOTES
Progress Note - General Surgery   [place] Esters 66 y o  male MRN: [id number]  Unit/Bed#: -01 Encounter: [id number]    Assessment:  65 y/o male admitted with bacteremia and new onset afib probable cholecystitis with sludge, probable colangitis  He is now POD 1 s/p Lap Joanne  · Blood Cx have been neg  Gallbladder sludge shows gm neg rods  Plan:  · Doing well, continue current regimen for pain control  · Ambulate the patient  · Continue Zosyn   · Cardiology following-ok to start IV heparin today as per surgery      Subjective/Objective   Chief Complaint: I am feeling better     Subjective: No acute overnight events  Pt feels much better this am  No cp, nausea, vomiting  tolerating diet, no flatus  Objective:     Blood pressure 112/80, pulse 74, temperature 97 5 °F (36 4 °C), temperature source Oral, resp  rate 22, height 5' 8" (1 727 m), weight 92 kg (202 lb 13 2 oz), SpO2 98 %  ,Body mass index is 30 84 kg/m²  Intake/Output Summary (Last 24 hours) at 2/9/2021 0837  Last data filed at 2/9/2021 0730  Gross per 24 hour   Intake 2295 ml   Output 650 ml   Net 1645 ml       Invasive Devices     Peripheral Intravenous Line            Peripheral IV 02/05/21 Left Antecubital 3 days    Peripheral IV 02/05/21 Right Antecubital 3 days    Peripheral IV 02/05/21 Right Hand 3 days                Physical Exam  Constitutional:       Appearance: Normal appearance  HENT:      Head: Normocephalic and atraumatic  Nose: Nose normal       Mouth/Throat:      Mouth: Mucous membranes are moist    Eyes:      Pupils: Pupils are equal, round, and reactive to light  Neck:      Musculoskeletal: Neck supple  Cardiovascular:      Rate and Rhythm: Normal rate  Rhythm irregular  Comments: , NSR with frequent ectopy on telemetry   Pulmonary:      Effort: Pulmonary effort is normal    Abdominal:      General: Abdomen is flat  Bowel sounds are normal  There is no distension  Palpations: Abdomen is soft   There is no mass  Tenderness: There is no guarding  Hernia: A hernia is present  Comments: Reducible, non-tender supraumbilical hernia   Minimal ttp   Skin:     General: Skin is warm and dry  Neurological:      Mental Status: He is alert and oriented to person, place, and time     Psychiatric:         Mood and Affect: Mood normal          Behavior: Behavior normal          Lab, Imaging and other studies:  CBC:   Lab Results   Component Value Date    WBC 8 48 02/09/2021    HGB 12 2 02/09/2021    HCT 38 0 02/09/2021     (H) 02/09/2021     02/09/2021    MCH 32 0 02/09/2021    MCHC 32 1 02/09/2021    RDW 12 7 02/09/2021    MPV 10 9 02/09/2021   , CMP:   Lab Results   Component Value Date    SODIUM 137 02/09/2021    K 4 7 02/09/2021     02/09/2021    CO2 24 02/09/2021    BUN 18 02/09/2021    CREATININE 0 88 02/09/2021    CALCIUM 8 2 (L) 02/09/2021    EGFR 83 02/09/2021     VTE Pharmacologic Prophylaxis: Heparin  VTE Mechanical Prophylaxis: sequential compression device

## 2021-02-09 NOTE — ASSESSMENT & PLAN NOTE
· Secondary to acute cholecystitis  · Presented to the ER on 02/05 with fevers, chills, abdominal pain x2 days  · WBC count 14 on arrival  · CT scan showed distended gallbladder  · RUQ US "Sludge in the gallbladder which is distended and demonstrates wall thickening"  · S/p Lap ivon 2/8  · Continue zosyn

## 2021-02-09 NOTE — PROGRESS NOTES
General Cardiology   Progress Note -  Team One   Zoë Sorensen 66 y o  male MRN: [id number]    Unit/Bed#: -01 Encounter: [id number]    Assessment/ Plan     1  Atrial fibrillation with RVR   New diagnosis this admission   Converted to normal sinus rhythm 02/06/2021 status post IV Cardizem  [id number] Vasc score 5:   Was on heparin prior to OR  Recommend restart anticoagulation once cleared by General surgery  Recommend Eliquis 5 mg p o  b i d   at discharge  Reviewed telemetry showing NSR   On metoprolol 50 mg p o  b i d  Patient will follow up with cardiologist at [place] within 1 month  2  Cardiomyopathy EF 40%  Tachy induced versus ischemic etiology  Follow-up as outpatient with primary cardiologist at the [address]  continue medical therapy: Metoprolol and lisinopril  Recommend changing metoprolol tartrate to the XL prior to discharge     3  Gram-negative bacteremia   on IV antibiotic:  Zosyn   followed by primary team     4  Coronary artery disease  with stenting in 2018   no records to review   continue aspirin, statin and beta-blocker     5  Hypertension-  Average blood pressure 134/66     6  Acute cholecystitis status post lap choly 2/8    Subjective  Patient reporting constipation  No cardiac complaints of palpitations, chest pain or SOB  No fever or chills  No N/V/D  Review of Systems   Constitution: Negative for chills, fever and malaise/fatigue  HENT: Negative for congestion  Cardiovascular: Negative for chest pain, dyspnea on exertion, leg swelling, orthopnea and palpitations  Respiratory: Negative for cough and shortness of breath  Musculoskeletal: Negative for falls  Gastrointestinal: Positive for constipation  Negative for bloating, nausea and vomiting  Neurological: Negative for dizziness and light-headedness  Psychiatric/Behavioral: Negative for altered mental status  All other systems reviewed and are negative        Objective:   Vitals: Blood pressure 112/80, pulse 74, temperature 97 5 °F (36 4 °C), temperature source Oral, resp  rate 22, height 5' 8" (1 727 m), weight 92 kg (202 lb 13 2 oz), SpO2 98 %  ,       Body mass index is 30 84 kg/m²  ,     Systolic (88TCW), OFQ:688 , Min:108 , LZT:254     Diastolic (26SFX), ECZ:67, Min:50, Max:94          Intake/Output Summary (Last 24 hours) at 2/9/2021 0846  Last data filed at 2/9/2021 0730  Gross per 24 hour   Intake 2295 ml   Output 650 ml   Net 1645 ml     Weight (last 2 days)     None        Telemetry Review: Normal sinus rhythm     Physical Exam  Constitutional:       General: He is not in acute distress  Appearance: He is obese  HENT:      Head: Normocephalic  Mouth/Throat:      Mouth: Mucous membranes are moist    Neck:      Musculoskeletal: Neck supple  Cardiovascular:      Rate and Rhythm: Normal rate and regular rhythm  Pulses: Normal pulses  Heart sounds: No murmur  Pulmonary:      Effort: Pulmonary effort is normal  No respiratory distress  Breath sounds: Normal breath sounds  Comments: RA  Abdominal:      General: Bowel sounds are normal       Palpations: Abdomen is soft  Musculoskeletal: Normal range of motion  General: No swelling  Skin:     General: Skin is warm and dry  Capillary Refill: Capillary refill takes less than 2 seconds  Neurological:      General: No focal deficit present  Mental Status: He is alert and oriented to person, place, and time     Psychiatric:         Mood and Affect: Mood normal          LABORATORY RESULTS  Results from last 7 days   Lab Units 02/05/21  1223   TROPONIN I ng/mL <0 02     CBC with diff:   Results from last 7 days   Lab Units 02/09/21  0629 02/08/21  0226 02/07/21  0344 02/06/21  0604 02/05/21  1223   WBC Thousand/uL 8 48 6 22 8 44 12 36* 14 00*   HEMOGLOBIN g/dL 12 2 12 2 12 9 14 0 16 1   HEMATOCRIT % 38 0 37 1 39 3 44 4 49 2   MCV fL 100* 99* 99* 103* 98   PLATELETS Thousands/uL 201 171 155 159 186   MCH pg 32 0 32 4 32 4 32 3 32 0   MCHC g/dL 32 1 32 9 32 8 31 5 32 7   RDW % 12 7 13 1 13 2 13 2 13 0   MPV fL 10 9 10 6 10 7 10 5 10 8   NRBC AUTO /100 WBCs  --  0 0 0 0       CMP:  Results from last 7 days   Lab Units 02/09/21  0629 02/08/21  0226 02/07/21  0344 02/06/21  0604 02/05/21  1223   POTASSIUM mmol/L 4 7 3 8 3 9 3 6 3 7   CHLORIDE mmol/L 105 106 103 102 96*   CO2 mmol/L 24 20* 22 21 25   BUN mg/dL 18 13 17 21 23   CREATININE mg/dL 0 88 0 87 0 91 1 12 1 13   CALCIUM mg/dL 8 2* 7 6* 7 8* 8 1* 8 9   AST U/L  --  41 30 20 19   ALT U/L  --  36 31 23 22   ALK PHOS U/L  --  69 64 64 74   EGFR ml/min/1 73sq m 83 83 81 63 62       BMP:  Results from last 7 days   Lab Units 02/09/21  0629 02/08/21  0226 02/07/21  0344 02/06/21  0604 02/05/21  1223   POTASSIUM mmol/L 4 7 3 8 3 9 3 6 3 7   CHLORIDE mmol/L 105 106 103 102 96*   CO2 mmol/L 24 20* 22 21 25   BUN mg/dL 18 13 17 21 23   CREATININE mg/dL 0 88 0 87 0 91 1 12 1 13   CALCIUM mg/dL 8 2* 7 6* 7 8* 8 1* 8 9       Lab Results   Component Value Date    NTBNP 1,311 (H) 02/05/2021             Results from last 7 days   Lab Units 02/09/21  0629 02/08/21  0226 02/06/21  0604   MAGNESIUM mg/dL 1 9 1 7 2 4                 Results from last 7 days   Lab Units 02/05/21  1223   TSH 3RD GENERATON uIU/mL 2 993       Results from last 7 days   Lab Units 02/05/21  1223   INR  1 19       Lipid Profile:   No results found for: CHOL  Lab Results   Component Value Date    HDL 47 02/06/2021     Lab Results   Component Value Date    LDLCALC 62 02/06/2021     Lab Results   Component Value Date    TRIG 110 02/06/2021       Cardiac testing:   Results for orders placed during the hospital encounter of 02/05/21   Echo complete with contrast if indicated    Narrative [address] [place] [address]    Transthoracic Echocardiogram  2D, M-mode, Doppler, and Color Doppler    Study date:  08-Feb-2021    Patient: Bradford Ramirez  MR number: [id number]  Account number: [de-identified]  : 1945  Age: 66 years  Gender: Male  Status: Inpatient  Location: [address] [place] [address]  Height: 68 in  Weight: 201 5 lb  BP: 140/ 71 mmHg    Indications: Atrial Fibrillation    Diagnoses: I48 0 - Atrial fibrillation    Sonographer:  Swati Lawrence KRISTEN  Referring Physician:  Shanthi Mishra MD  Group:  [facility]  Interpreting Physician:  Yandy Bejarano MD    SUMMARY    LEFT VENTRICLE:  Systolic function was moderately reduced by visual assessment  Ejection fraction was estimated to be 40 %  There was moderate diffuse hypokinesis  Wall thickness was moderately increased  Features were consistent with a pseudonormal left ventricular filling pattern, with concomitant abnormal relaxation and increased filling pressure (grade 2 diastolic dysfunction)  RIGHT VENTRICLE:  Systolic pressure was moderately increased  Estimated peak pressure was 50 mmHg  MITRAL VALVE:  There was mild regurgitation  TRICUSPID VALVE:  There was mild to moderate regurgitation  IVC, HEPATIC VEINS:  The inferior vena cava was mildly dilated  HISTORY: PRIOR HISTORY: Hypertension    PROCEDURE: The study was performed in the [address] [place] [address]  This was a routine study  The transthoracic approach was used  The study included complete 2D imaging, M-mode, complete spectral Doppler, and color Doppler  The heart rate was  104 bpm, at the start of the study  Images were obtained from the parasternal, apical, subcostal, and suprasternal notch acoustic windows  Intravenous contrast (  8 ml Definity in NSS) was administered to opacify the left ventricle  Echocardiographic views were limited due to decreased penetration and lung interference  This was a technically difficult study  LEFT VENTRICLE: Size was normal  Systolic function was moderately reduced by visual assessment  Ejection fraction was estimated to be 40 %  There was moderate diffuse hypokinesis  Wall thickness was moderately increased  DOPPLER: The ratio  of early ventricular filling to atrial contraction velocities was within the normal range  Features were consistent with a pseudonormal left ventricular filling pattern, with concomitant abnormal relaxation and increased filling pressure  (grade 2 diastolic dysfunction)  RIGHT VENTRICLE: The size was normal  Systolic function was normal  DOPPLER: Systolic pressure was moderately increased  Estimated peak pressure was 50 mmHg  LEFT ATRIUM: Size was normal     RIGHT ATRIUM: Size was normal     MITRAL VALVE: Valve structure was normal  There was normal leaflet separation  DOPPLER: The transmitral velocity was within the normal range  There was no evidence for stenosis  There was mild regurgitation  AORTIC VALVE: The valve was trileaflet  Leaflets exhibited normal thickness, mild calcification, and normal cuspal separation  DOPPLER: Transaortic velocity was within the normal range  There was no evidence for stenosis  There was no  regurgitation  TRICUSPID VALVE: The valve structure was normal  There was normal leaflet separation  DOPPLER: The transtricuspid velocity was within the normal range  There was no evidence for stenosis  There was mild to moderate regurgitation  PULMONIC VALVE: Leaflets exhibited normal thickness, no calcification, and normal cuspal separation  DOPPLER: The transpulmonic velocity was within the normal range  There was no regurgitation  PERICARDIUM: There was no pericardial effusion  AORTA: The root exhibited normal size  SYSTEMIC VEINS: IVC: The inferior vena cava was mildly dilated      SYSTEM MEASUREMENT TABLES    2D  %FS: 32 53 %  Ao Diam: 2 77 cm  Ao asc: 3 15 cm  EDV(Teich): 119 87 ml  EF(Teich): 60 62 %  ESV(Teich): 47 21 ml  IVC: 22 31 mm  IVSd: 1 49 cm  LA Area: 16 04 cm2  LA Diam: 3 97 cm  LVEDV MOD A4C: 88 92 ml  LVEF MOD A4C: 45 34 %  LVESV MOD A4C: 48 6 ml  LVIDd: 5 03 cm  LVIDs: 3 39 cm  LVLd A4C: 7 19 cm  LVLs A4C: 6 3 cm  LVOT Diam: 2 09 cm  LVPWd: 1 35 cm  RA Area: 16 08 cm2  RVIDd: 3 82 cm  SV MOD A4C: 40 32 ml  SV(Teich): 72 66 ml    CW  MR VTI: 187 93 cm  MR Vmax: 5 79 m/s  MR Vmean: 4 22 m/s  MR maxP 97 mmHg  MR meanP 78 mmHg  TR Vmax: 3 47 m/s  TR maxP 1 mmHg    MM  TAPSE: 1 61 cm    PW  E' Sept: 0 11 m/s  E/E' Sept: 7 41  MV A Cain: 0 5 m/s  MV Dec Toole: 3 8 m/s2  MV DecT: 207 04 ms  MV E Cain: 0 79 m/s  MV E/A Ratio: 1 57  MV PHT: 60 04 ms  MVA By PHT: 3 66 cm2    [facility] Accredited Echocardiography [facility]    Prepared and electronically signed by    Gian El MD  Signed 2021 09:34:10         Meds/Allergies   all current active meds have been reviewed and current meds:   Current Facility-Administered Medications   Medication Dose Route Frequency    acetaminophen (TYLENOL) tablet 650 mg  650 mg Oral Q6H PRN    HYDROmorphone (DILAUDID) injection 0 5 mg  0 5 mg Intravenous Q3H PRN    levalbuterol (XOPENEX) inhalation solution 1 25 mg  1 25 mg Nebulization Q6H PRN    lisinopril (ZESTRIL) tablet 5 mg  5 mg Oral Daily    metoprolol (LOPRESSOR) injection 5 mg  5 mg Intravenous Q6H PRN    metoprolol tartrate (LOPRESSOR) tablet 75 mg  75 mg Oral Q12H [address]    oxyCODONE (ROXICODONE) IR tablet 10 mg  10 mg Oral Q6H PRN    oxyCODONE (ROXICODONE) IR tablet 5 mg  5 mg Oral Q4H PRN    pantoprazole (PROTONIX) EC tablet 40 mg  40 mg Oral Early Morning    piperacillin-tazobactam (ZOSYN) 3 375 g in sodium chloride 0 9 % 100 mL IVPB  3 375 g Intravenous Q6H    sodium chloride 0 9 % inhalation solution 3 mL  3 mL Nebulization Q6H PRN     Medications Prior to Admission   Medication    aspirin (ECOTRIN LOW STRENGTH) 81 mg EC tablet    METOPROLOL SUCCINATE PO    omeprazole (PriLOSEC) 20 mg delayed release capsule     Counseling / Coordination of Care  Total floor / unit time spent today 20 minutes    Greater than 50% of total time was spent with the patient and / or family counseling and / or coordination of care  ** Please Note: Dragon [employer] Dictation voice to text software may have been used in the creation of this document   **

## 2021-02-09 NOTE — ASSESSMENT & PLAN NOTE
Wt Readings from Last 3 Encounters:   02/05/21 92 kg (202 lb 13 2 oz)       · Echo with an EF of 40% moderate diffuse hypokinesis with grade 2 diastolic dysfunction and a PA pressure of 50  · Cardiology following  · Outpatient ischemic workup  · Goal-directed therapy with beta-blocker and addition of lisinopril  · I/O  · Daily weight  · At this point euvolemic no need for diuretic

## 2021-02-09 NOTE — ASSESSMENT & PLAN NOTE
· E coli bacteremia, POA, secondary to acute cholecystitis  · BC 1/2 E Coli  · Repeat BC Negative till date  · Cont Zosyn  · Fluid cultures from lap choly growing Gram-negative rods as well  · Once patient has been safely transitioned to Eliquis, would anticipate discharge on ciprofloxacin to complete a 2 week course of antibiotics

## 2021-02-09 NOTE — ASSESSMENT & PLAN NOTE
· Presented with new onset rapid AFib Now in NSR, converted 2/6  · On 75mg Metoprolol Q12 due to frequent PACs and PVCs noted on telemtry  · Resume Heparin gtt, held for the OR 2/8  · transition to Eliquis once stable from surgical perspective  · ECHO - ejection fraction 06%, grade 2 diastolic dysfunction, moderate diffuse hypokinesis

## 2021-02-09 NOTE — OP NOTE
OPERATIVE REPORT  PATIENT NAME: Tommy John    :  1945  MRN: [id number]  Pt Location: [facility] OR ROOM 01    SURGERY DATE: 2021    Surgeon(s) and Role:     * Lashell Lundy MD - Primary     * Dale Browne PA-C - Assisting     * Petra Herndon PA-C - Assisting    Preop Diagnosis:  Gallbladder disease [K82 9]  Acute calculous cholecystitis with elevated liver functions and cholangitis bacteremia    Post-Op Diagnosis Codes:     * Gallbladder disease [K82 9]  Same with gallbladder hydrops, distension and inflammation with adhesions  Anesthesia Type: General with local field and preperitoneal infiltration block (by surgeon)     Procedure(s) (LRB):  CHOLECYSTECTOMY LAPAROSCOPIC (N/A)   with lysis of adhesions and percutaneous gallbladder decompression      Specimen(s):  ID Type Source Tests Collected by Time Destination   1 :  Tissue Gallbladder TISSUE EXAM Lashell Lundy MD 2021 1132    A :  Body Fluid Gallbladder ANAEROBIC CULTURE AND GRAM STAIN Lashell Lundy MD 2021 1055    B :  Body Fluid Gallbladder BODY FLUID CULTURE AND GRAM STAIN Lashell Lundy MD 2021 1055    C :  Body Fluid Gallbladder AMYLASE, BODY FLUID Lashell Lundy MD 2021 1056        Estimated Blood Loss:   25 mL    Drains:  None    Condition:   Stable and Extubated to Recovery  Tolerated Procedure well  Complications:   None    Background and Indications   67 yo CAD with subcostal pain, shaking chills, EColi bacteremia and hyperbilirubinemia  Dependent sludge suggested with inflammation on CT and US without ductal dilation  Bilirubin normalized with improved symptoms  Resolved AFIB since admission with hypokinesis and reduced ejection fraction by pre-op ECHO accompanying his enterococcus and klebsiella bacteremia  Cardiac, and medical perioperative optimization management appreciated  After review of management options and risks, he desires cholecystectomy   Potential involvement, adhesion or injury to adjacent bile duct, bowel and liver were reviewed along with possible need to convert to an open procedure, drainage, and/ or cholangiogram  He also understands that further GI, Cardiac and medical  management may be required contingent upon the operative findings  He also understands that his long standing asymptomatic supraumbilical hernia will not be repaired during this urgent procedure for infection and sepsis  He intends on rescheduling his surgical follow up at the [address] [facility] for this hernia repair  Procedure and Findings:   His protuberant abdomen was sterilely prepped and draped  The supraumbilical hernia was easily reduced  Patient identity and surgical approach were re-confirmed with the attentive operative team in OR #3  Local anesthetic was infiltrated below the umbilicus and an incision made allowing insertion of Veress needle for uneventful insufflation followed by 5mm visiport trocar insertion  Additional epigastric, right upper and lower trocars were placed under direct vision with abdominal wall trans illumination guiding port location and local preperitoneal field block infiltration  The patient was then placed in steep reverse Trendelenburg position and rotated left  Greater omental adhesions were noted in the supraumbilical hernia sac and these were not disturbed to minimize potential for future intestinal incarceration  Stomach was decompressed and there was abundant omental and mesenteric adipose tissue  Omental and proximal transverse colonic adhesions surrounded the tense and thickened gallbladder fundus and body  Marked thickening of the infundibulum and adjacent cystic duct were also noted after careful exposure and take down of the pericholecystic adhesions  With laparoscopic visualized guidance to facilitate grasping and manipulation, the gallbladder was percutaneously decompressed of over 50ml mucoid purulent fluid using the Veress needle and sent for microbiology analysis        Meticulous dissection was required with frequent irrigation to fully expose the gallbladder neck and circumferentially isolate the infundibulum, cystic duct and cystic artery before ligation and transection  Prominent nodes of Calot were also dissected free for exposure of the cystic duct  The inflamed and thickened gallbladder was dissected from its partially intrahepatic and inflamed position using variable oscillation of the harmonic scalpel along with electrocauterization of the hepatic bed  The gallbladder and gareth tissue were uneventfully withdrawn via endobag through the epigastric trocar site and sent for pathologic analysis  The inflamed hepatic dissection bed was copiously irrigated and reinspected with confirmation of hemostasis after partial desufflation  The epigastric trocar site fascia was approximated with 0-Vicryl figure of 8 sutures x2 and remaining Trocars removed under direct visualization  Port site skin was closed with 4-0 Monocryl followed by skin sealant  He tolerated the procedure well and transferred to recovery in stable and extubated condition  His girlfriend was apprised of the operative findings and anticipated recovery  Ongoing Cardiac and Medical management and follow up are appreciated with continued broad spectrum antibiotics  I was present for the entire procedure and no surgical resident was available  A physician assistant was required during the procedure for retraction, tissue handling, visualization and closure  This procedure required significant laparoscopic skills and almost twice the usual operative time due to the marked inflammation, distension, and adhesions in addition to his existing abdominal wall hernia         SIGNATURE: Ricco Dejesus MD  DATE: February 8, 2021  TIME: 11:57

## 2021-02-09 NOTE — PROGRESS NOTES
Progress Note - Bradley Salinas 1945, 66 y o  male MRN: [id number]    Unit/Bed#: -01 Encounter: [id number]    Primary Care Provider: No primary care provider on file  Date and time admitted to hospital: 2/5/2021 11:40 AM        Sepsis due to Escherichia coli without acute organ dysfunction (HCC)  Assessment & Plan  · Sepsis, POA, as evidenced by fever 100 6, tachycardia 148, WBC 14, procal 2 33 secondary to acute cholecystitis  · On IV Zosyn  · Procalcitonin 2 3 > 5 3  · Blood cultures - 1/2 E Coli  · Repeat BC - NG till date  · CT scan showed distended gallbladder   · right upper quadrant ultrasound; Sludge in the gallbladder which is distended and demonstrates wall thickening    Sonographic Tamayo's sign however is negative   · S/p laparoscopic cholecystectomy 2/8/21    Gram-negative bacteremia  Assessment & Plan  · E coli bacteremia, POA, secondary to acute cholecystitis  · BC 1/2 E Coli  · Repeat BC Negative till date  · Cont Zosyn  · Fluid cultures from lap choly growing Gram-negative rods as well  · Once patient has been safely transitioned to Eliquis, would anticipate discharge on ciprofloxacin to complete a 2 week course of antibiotics    Hypertension  Assessment & Plan  · Currently normotensive  · On Lopressor      PAF (paroxysmal atrial fibrillation) (HCC)  Assessment & Plan  · Presented with new onset rapid AFib with heart rates 160s to 170s  · Received Cardizem bolus and drip in the ER as well as IVF bolus  · Now in NSR, converted 2/6  · On 75mg Metoprolol Q12 due to frequent PACs and PVCs noted on telemtry  · Resume Heparin gtt, held for the OR 2/8  · transition to Eliquis once stable from surgical perspective  · ECHO - ejection fraction 34%, grade 2 diastolic dysfunction, moderate diffuse hypokinesis    * Acute cholecystitis  Assessment & Plan  · Secondary to acute cholecystitis  · Presented to the ER on 02/05 with fevers, chills, abdominal pain x2 days  · WBC count 14 on arrival  · CT scan showed distended gallbladder  · RUQ US "Sludge in the gallbladder which is distended and demonstrates wall thickening"  · S/p Lap ivon   · Continue zosyn        VTE Pharmacologic Prophylaxis:   Pharmacologic: Heparin Drip  Mechanical VTE Prophylaxis in Place: Yes    Patient Centered Rounds: I have performed bedside rounds with nursing staff today  Discussions with Specialists or Other Care Team Provider: CM    Education and Discussions with Family / Ma Michael declined, he has been updating them himself    Time Spent for Care: 30 minutes  More than 50% of total time spent on counseling and coordination of care as described above  Current Length of Stay: 4 day(s)    Current Patient Status: Inpatient   Certification Statement: The patient will continue to require additional inpatient hospital stay due to as above    Discharge Plan: 1-2 days    Code Status: Level 1 - Full Code      Subjective:   No overnight night - stable post-op    Objective:     Vitals:   Temp (24hrs), Av 8 °F (36 6 °C), Min:97 5 °F (36 4 °C), Max:98 3 °F (36 8 °C)    Temp:  [97 5 °F (36 4 °C)-98 3 °F (36 8 °C)] 97 5 °F (36 4 °C)  HR:  [] 74  Resp:  [14-26] 22  BP: (108-165)/(50-94) 112/80  SpO2:  [90 %-98 %] 98 %  Body mass index is 30 84 kg/m²  Input and Output Summary (last 24 hours): Intake/Output Summary (Last 24 hours) at 2021 0751  Last data filed at 2021 0730  Gross per 24 hour   Intake 2443 75 ml   Output 850 ml   Net 1593 75 ml       Physical Exam:     Physical Exam  Vitals signs and nursing note reviewed  Constitutional:       General: He is not in acute distress  Appearance: Normal appearance  He is not ill-appearing, toxic-appearing or diaphoretic  Cardiovascular:      Rate and Rhythm: Normal rate and regular rhythm  Pulses: Normal pulses  Heart sounds: No gallop  Pulmonary:      Effort: Pulmonary effort is normal  No respiratory distress        Breath sounds: Normal breath sounds  No wheezing  Abdominal:      General: Bowel sounds are normal  There is distension  Palpations: Abdomen is soft  There is no mass  Tenderness: There is abdominal tenderness (RUQ/epigatric post op tenderness)  There is no right CVA tenderness, left CVA tenderness, guarding or rebound  Musculoskeletal: Normal range of motion  General: No swelling or deformity  Right lower leg: No edema  Left lower leg: No edema  Skin:     General: Skin is warm and dry  Capillary Refill: Capillary refill takes less than 2 seconds  Coloration: Skin is not jaundiced or pale  Findings: No bruising, erythema or lesion  Neurological:      General: No focal deficit present  Mental Status: He is alert  Mental status is at baseline  Cranial Nerves: No cranial nerve deficit  Psychiatric:         Mood and Affect: Mood normal          Thought Content:  Thought content normal          Judgment: Judgment normal        Additional Data:     Labs:    Results from last 7 days   Lab Units 02/09/21  0629 02/08/21  0226   WBC Thousand/uL 8 48 6 22   HEMOGLOBIN g/dL 12 2 12 2   HEMATOCRIT % 38 0 37 1   PLATELETS Thousands/uL 201 171   NEUTROS PCT %  --  45   LYMPHS PCT %  --  40   MONOS PCT %  --  11   EOS PCT %  --  4     Results from last 7 days   Lab Units 02/09/21  0629 02/08/21  0226   SODIUM mmol/L 137 138   POTASSIUM mmol/L 4 7 3 8   CHLORIDE mmol/L 105 106   CO2 mmol/L 24 20*   BUN mg/dL 18 13   CREATININE mg/dL 0 88 0 87   ANION GAP mmol/L 8 12   CALCIUM mg/dL 8 2* 7 6*   ALBUMIN g/dL  --  2 3*   TOTAL BILIRUBIN mg/dL  --  0 70   ALK PHOS U/L  --  69   ALT U/L  --  36   AST U/L  --  41   GLUCOSE RANDOM mg/dL 122 87     Results from last 7 days   Lab Units 02/05/21  1223   INR  1 19             Results from last 7 days   Lab Units 02/06/21  0604 02/05/21  1223   LACTIC ACID mmol/L  --  1 6   PROCALCITONIN ng/ml 5 34* 2 33*           * I Have Reviewed All Lab Data Listed Above   * Additional Pertinent Lab Tests Reviewed: All Labs Within Last 24 Hours Reviewed    Imaging:    Imaging Reports Reviewed Today Include:   Imaging Personally Reviewed by Myself Includes:      Recent Cultures (last 7 days):     Results from last 7 days   Lab Units 02/08/21  1055 02/06/21  1103 02/06/21  1057 02/05/21  1223   BLOOD CULTURE   --  No Growth at 48 hrs  No Growth at 48 hrs  No Growth at 72 hrs  Escherichia coli*   GRAM STAIN RESULT  2+ Gram negative rods*  No polys seen*  --   --  Gram negative rods*       Last 24 Hours Medication List:   Current Facility-Administered Medications   Medication Dose Route Frequency Provider Last Rate    acetaminophen  650 mg Oral Q6H PRN AARON Lyman-SHAYY      HYDROmorphone  0 5 mg Intravenous Q3H PRN AARON Lyman-SHAYY      levalbuterol  1 25 mg Nebulization Q6H PRN AARON Lyman-C      lisinopril  5 mg Oral Daily OLAF Ross      metoprolol  5 mg Intravenous Q6H PRN AARON Lyman-SHAYY      metoprolol tartrate  75 mg Oral Q12H [address] Petra Herndon PA-C      oxyCODONE  10 mg Oral Q6H PRN Petra Herndon PA-SHAYY      oxyCODONE  5 mg Oral Q4H PRN AARON Lyman-SHAYY      pantoprazole  40 mg Oral Early Morning OLAF Rios      piperacillin-tazobactam  3 375 g Intravenous Q6H Petra Herndon PA-C 3 375 g (02/09/21 0735)    sodium chloride  75 mL/hr Intravenous Continuous Petra Herndon PA-C Stopped (02/08/21 1821)    sodium chloride  3 mL Nebulization Q6H PRN Petra Herndon PA-C          Today, Patient Was Seen By: Juan Jose Sanders MD    ** Please Note: Dictation voice to text software may have been used in the creation of this document   **

## 2021-02-09 NOTE — CASE MANAGEMENT
Continue to follow  CM notified that Pt will need Eliquis upon discharge  [facility]([phone]), pharmacist informed CM that Pt does not go to their pharmacy  Call placed to [address] [place] [address] pharmacy([phone]), spoke with Milana Fernandez who directed CM to speak with nurse from Pt's [facility]  CM spoke with Honor Merlin who informed the approval for Eliquis could take up to a week  Holley informed CM that Pt will have to go to a private pharmacy to get his Eliqiuis script filled  Holley requested CM to faxed requested clinical information to [facility], Attn: Kath Hermosillo at [phone]  Faxed requested information to [facility] at [phone], attn: Polly Dorantes  Met with Pt  CM informed Pt that he will need to go to a private pharmacy to have Eliquis filled while [facility] works on getting Eliquis approved  CM informed Pt that Holley at [address] [place] [address] informed CM that approval for Eliquis could take up to a week  Eliquis 30 day free trial card coupon and $10 coupon Eliquis coupon card given to Pt to bring to pharmacy  Pt reports [facility] will use [facility] in [facility] ([place], [place]) to get his medications from the hospital and will bring Eliquis script and coupons to the pharmacy  CM to follow

## 2021-02-09 NOTE — ASSESSMENT & PLAN NOTE
· Sepsis, POA, as evidenced by fever 100 6, tachycardia 148, WBC 14, procal 2 33 secondary to acute cholecystitis  · On IV Zosyn  · Procalcitonin 2 3 > 5 3  · Blood cultures - 1/2 E Coli  · Repeat BC - NG till date  · CT scan showed distended gallbladder   · right upper quadrant ultrasound; Sludge in the gallbladder which is distended and demonstrates wall thickening    Sonographic Tamayo's sign however is negative   · S/p laparoscopic cholecystectomy 2/8/21

## 2021-02-10 LAB
ANION GAP SERPL CALCULATED.3IONS-SCNC: 9 MMOL/L (ref 4–13)
APTT PPP: 59 SECONDS (ref 23–37)
APTT PPP: 85 SECONDS (ref 23–37)
BACTERIA BLD CULT: ABNORMAL
BACTERIA BLD CULT: ABNORMAL
BACTERIA BLD CULT: NORMAL
BACTERIA SPEC BFLD CULT: ABNORMAL
BUN SERPL-MCNC: 18 MG/DL (ref 5–25)
CALCIUM SERPL-MCNC: 8.6 MG/DL (ref 8.3–10.1)
CHLORIDE SERPL-SCNC: 104 MMOL/L (ref 100–108)
CO2 SERPL-SCNC: 25 MMOL/L (ref 21–32)
CREAT SERPL-MCNC: 0.89 MG/DL (ref 0.6–1.3)
ERYTHROCYTE [DISTWIDTH] IN BLOOD BY AUTOMATED COUNT: 12.8 % (ref 11.6–15.1)
GFR SERPL CREATININE-BSD FRML MDRD: 82 ML/MIN/1.73SQ M
GLUCOSE SERPL-MCNC: 93 MG/DL (ref 65–140)
GRAM STN SPEC: ABNORMAL
HCT VFR BLD AUTO: 39.8 % (ref 36.5–49.3)
HGB BLD-MCNC: 12.9 G/DL (ref 12–17)
MCH RBC QN AUTO: 31.7 PG (ref 26.8–34.3)
MCHC RBC AUTO-ENTMCNC: 32.4 G/DL (ref 31.4–37.4)
MCV RBC AUTO: 98 FL (ref 82–98)
PLATELET # BLD AUTO: 225 THOUSANDS/UL (ref 149–390)
PMV BLD AUTO: 10.5 FL (ref 8.9–12.7)
POTASSIUM SERPL-SCNC: 3.9 MMOL/L (ref 3.5–5.3)
PROCALCITONIN SERPL-MCNC: 0.59 NG/ML
RBC # BLD AUTO: 4.07 MILLION/UL (ref 3.88–5.62)
SODIUM SERPL-SCNC: 138 MMOL/L (ref 136–145)
WBC # BLD AUTO: 8.79 THOUSAND/UL (ref 4.31–10.16)

## 2021-02-10 PROCEDURE — 85730 THROMBOPLASTIN TIME PARTIAL: CPT | Performed by: INTERNAL MEDICINE

## 2021-02-10 PROCEDURE — 99232 SBSQ HOSP IP/OBS MODERATE 35: CPT | Performed by: INTERNAL MEDICINE

## 2021-02-10 PROCEDURE — 84145 PROCALCITONIN (PCT): CPT | Performed by: INTERNAL MEDICINE

## 2021-02-10 PROCEDURE — 94760 N-INVAS EAR/PLS OXIMETRY 1: CPT

## 2021-02-10 PROCEDURE — 85027 COMPLETE CBC AUTOMATED: CPT | Performed by: PHYSICIAN ASSISTANT

## 2021-02-10 PROCEDURE — 99024 POSTOP FOLLOW-UP VISIT: CPT | Performed by: SURGERY

## 2021-02-10 PROCEDURE — 80048 BASIC METABOLIC PNL TOTAL CA: CPT | Performed by: PHYSICIAN ASSISTANT

## 2021-02-10 RX ORDER — DOCUSATE SODIUM 100 MG/1
100 CAPSULE, LIQUID FILLED ORAL 2 TIMES DAILY
Status: DISCONTINUED | OUTPATIENT
Start: 2021-02-10 | End: 2021-02-11 | Stop reason: HOSPADM

## 2021-02-10 RX ADMIN — DOCUSATE SODIUM 100 MG: 100 CAPSULE, LIQUID FILLED ORAL at 20:32

## 2021-02-10 RX ADMIN — Medication 3.38 G: at 10:09

## 2021-02-10 RX ADMIN — DOCUSATE SODIUM 100 MG: 100 CAPSULE, LIQUID FILLED ORAL at 13:59

## 2021-02-10 RX ADMIN — HEPARIN SODIUM 2000 UNITS: 1000 INJECTION INTRAVENOUS; SUBCUTANEOUS at 14:14

## 2021-02-10 RX ADMIN — PANTOPRAZOLE SODIUM 40 MG: 40 TABLET, DELAYED RELEASE ORAL at 06:14

## 2021-02-10 RX ADMIN — Medication 3.38 G: at 03:44

## 2021-02-10 RX ADMIN — Medication 3.38 G: at 20:32

## 2021-02-10 RX ADMIN — METOPROLOL TARTRATE 75 MG: 25 TABLET ORAL at 20:32

## 2021-02-10 RX ADMIN — HEPARIN SODIUM 2000 UNITS: 1000 INJECTION INTRAVENOUS; SUBCUTANEOUS at 00:24

## 2021-02-10 RX ADMIN — METOPROLOL TARTRATE 75 MG: 25 TABLET ORAL at 10:08

## 2021-02-10 RX ADMIN — Medication 3.38 G: at 15:50

## 2021-02-10 RX ADMIN — APIXABAN 5 MG: 5 TABLET, FILM COATED ORAL at 18:21

## 2021-02-10 RX ADMIN — LISINOPRIL 5 MG: 5 TABLET ORAL at 10:08

## 2021-02-10 NOTE — PROGRESS NOTES
Progress Note - General Surgery   Carlotta Campbell 66 y o  male MRN: [id number]  Unit/Bed#: -01 Encounter: [id number]    Assessment:  67 y/o male admitted with bacteremia and new onset afib probable cholecystitis with sludge, probable colangitis  He is now POD 2 s/p Lap Joanne  · Blood Cx have been neg  Gallbladder sludge shows 2+gm neg rods  Body fluids Cx-3+growth of E coli      Plan:  · Doing well, continue current regimen for pain control  · Ambulate the patient  · Continue Zosyn- susceptibility noted   · Cardiology following- IV heparin started    Subjective/Objective     No acute overnight events  Pt feels much better this am  No cp, nausea, vomiting  tolerating diet, +passing gas      Objective:     Blood pressure 152/85, pulse 73, temperature 97 7 °F (36 5 °C), temperature source Oral, resp  rate 18, height 5' 8" (1 727 m), weight 92 kg (202 lb 13 2 oz), SpO2 96 %  ,Body mass index is 30 84 kg/m²  Intake/Output Summary (Last 24 hours) at 2/10/2021 0823  Last data filed at 2/9/2021 5915  Gross per 24 hour   Intake 100 ml   Output --   Net 100 ml       Invasive Devices     Peripheral Intravenous Line            Peripheral IV 02/09/21 Dorsal (posterior); Left Hand less than 1 day    Peripheral IV 02/09/21 Dorsal (posterior); Right Wrist less than 1 day                Physical Exam  Constitutional:       Appearance: Normal appearance  HENT:      Head: Normocephalic and atraumatic  Nose: Nose normal       Mouth/Throat:      Mouth: Mucous membranes are moist    Eyes:      Pupils: Pupils are equal, round, and reactive to light  Neck:      Musculoskeletal: Neck supple  Cardiovascular:      Rate and Rhythm: Normal rate  Rhythm irregular  Pulmonary:      Effort: Pulmonary effort is normal    Abdominal:      General: Abdomen is flat  Bowel sounds are normal  There is distension  Palpations: Abdomen is soft  There is no mass  Tenderness: There is no guarding        Hernia: A hernia is present  Comments: Incisions with dermabond, no drainage, no redness  Mild ttp  Mild distention on exam today  Mild ttp   Skin:     General: Skin is warm and dry  Neurological:      Mental Status: He is alert and oriented to person, place, and time     Psychiatric:         Mood and Affect: Mood normal          Behavior: Behavior normal          Lab, Imaging and other studies:  CBC:   Lab Results   Component Value Date    WBC 8 79 02/10/2021    HGB 12 9 02/10/2021    HCT 39 8 02/10/2021    MCV 98 02/10/2021     02/10/2021    MCH 31 7 02/10/2021    MCHC 32 4 02/10/2021    RDW 12 8 02/10/2021    MPV 10 5 02/10/2021   , CMP:   Lab Results   Component Value Date    SODIUM 138 02/10/2021    K 3 9 02/10/2021     02/10/2021    CO2 25 02/10/2021    BUN 18 02/10/2021    CREATININE 0 89 02/10/2021    CALCIUM 8 6 02/10/2021    EGFR 82 02/10/2021     VTE Pharmacologic Prophylaxis: Heparin  VTE Mechanical Prophylaxis: sequential compression device

## 2021-02-10 NOTE — PLAN OF CARE
Problem: Potential for Falls  Goal: Patient will remain free of falls  Description: INTERVENTIONS:  - Assess patient frequently for physical needs  -  Identify cognitive and physical deficits and behaviors that affect risk of falls    -  Berger fall precautions as indicated by assessment   - Educate patient/family on patient safety including physical limitations  - Instruct patient to call for assistance with activity based on assessment  - Modify environment to reduce risk of injury  - Consider OT/PT consult to assist with strengthening/mobility  Outcome: Progressing     Problem: CARDIOVASCULAR - ADULT  Goal: Maintains optimal cardiac output and hemodynamic stability  Description: INTERVENTIONS:  - Monitor I/O, vital signs and rhythm  - Monitor for S/S and trends of decreased cardiac output  - Administer and titrate ordered vasoactive medications to optimize hemodynamic stability  - Assess quality of pulses, skin color and temperature  - Assess for signs of decreased coronary artery perfusion  - Instruct patient to report change in severity of symptoms  Outcome: Progressing  Goal: Absence of cardiac dysrhythmias or at baseline rhythm  Description: INTERVENTIONS:  - Continuous cardiac monitoring, vital signs, obtain 12 lead EKG if ordered  - Administer antiarrhythmic and heart rate control medications as ordered  - Monitor electrolytes and administer replacement therapy as ordered  Outcome: Progressing     Problem: RESPIRATORY - ADULT  Goal: Achieves optimal ventilation and oxygenation  Description: INTERVENTIONS:  - Assess for changes in respiratory status  - Assess for changes in mentation and behavior  - Position to facilitate oxygenation and minimize respiratory effort  - Oxygen administered by appropriate delivery if ordered  - Initiate smoking cessation education as indicated  - Encourage broncho-pulmonary hygiene including cough, deep breathe, Incentive Spirometry  - Assess the need for suctioning and aspirate as needed  - Assess and instruct to report SOB or any respiratory difficulty  - Respiratory Therapy support as indicated  Outcome: Progressing     Problem: METABOLIC, FLUID AND ELECTROLYTES - ADULT  Goal: Electrolytes maintained within normal limits  Description: INTERVENTIONS:  - Monitor labs and assess patient for signs and symptoms of electrolyte imbalances  - Administer electrolyte replacement as ordered  - Monitor response to electrolyte replacements, including repeat lab results as appropriate  - Instruct patient on fluid and nutrition as appropriate  Outcome: Progressing  Goal: Fluid balance maintained  Description: INTERVENTIONS:  - Monitor labs   - Monitor I/O and WT  - Instruct patient on fluid and nutrition as appropriate  - Assess for signs & symptoms of volume excess or deficit  Outcome: Progressing     Problem: SAFETY ADULT  Goal: Maintain or return to baseline ADL function  Description: INTERVENTIONS:  -  Assess patient's ability to carry out ADLs; assess patient's baseline for ADL function and identify physical deficits which impact ability to perform ADLs (bathing, care of mouth/teeth, toileting, grooming, dressing, etc )  - Assess/evaluate cause of self-care deficits   - Assess range of motion  - Assess patient's mobility; develop plan if impaired  - Assess patient's need for assistive devices and provide as appropriate  - Encourage maximum independence but intervene and supervise when necessary  - Involve family in performance of ADLs  - Assess for home care needs following discharge   - Consider OT consult to assist with ADL evaluation and planning for discharge  - Provide patient education as appropriate  Outcome: Progressing     Problem: Prexisting or High Potential for Compromised Skin Integrity  Goal: Skin integrity is maintained or improved  Description: INTERVENTIONS:  - Identify patients at risk for skin breakdown  - Assess and monitor skin integrity  - Assess and monitor nutrition and hydration status  - Monitor labs   - Assess for incontinence   - Turn and reposition patient  - Assist with mobility/ambulation  - Relieve pressure over bony prominences  - Avoid friction and shearing  - Provide appropriate hygiene as needed including keeping skin clean and dry  - Evaluate need for skin moisturizer/barrier cream  - Collaborate with interdisciplinary team   - Patient/family teaching  - Consider wound care consult   Outcome: Progressing     Problem: Nutrition/Hydration-ADULT  Goal: Nutrient/Hydration intake appropriate for improving, restoring or maintaining nutritional needs  Description: Monitor and assess patient's nutrition/hydration status for malnutrition  Collaborate with interdisciplinary team and initiate plan and interventions as ordered  Monitor patient's weight and dietary intake as ordered or per policy  Utilize nutrition screening tool and intervene as necessary  Determine patient's food preferences and provide high-protein, high-caloric foods as appropriate       INTERVENTIONS:  - Monitor oral intake, urinary output, labs, and treatment plans  - Assess nutrition and hydration status and recommend course of action  - Evaluate amount of meals eaten  - Assist patient with eating if necessary   - Allow adequate time for meals  - Recommend/ encourage appropriate diets, oral nutritional supplements, and vitamin/mineral supplements  - Order, calculate, and assess calorie counts as needed  - Recommend, monitor, and adjust tube feedings and TPN/PPN based on assessed needs  - Assess need for intravenous fluids  - Provide specific nutrition/hydration education as appropriate  - Include patient/family/caregiver in decisions related to nutrition  Outcome: Progressing

## 2021-02-10 NOTE — ASSESSMENT & PLAN NOTE
· E coli bacteremia, POA, secondary to acute cholecystitis  · BC 1/2 E Coli  · Repeat BC Negative till date  · Cont Zosyn  · Fluid cultures from lap ivon growing e coli as well  · Continue Zosyn till DC, can transition to ciprofloxacin to complete a 2 week course

## 2021-02-10 NOTE — ASSESSMENT & PLAN NOTE
Wt Readings from Last 3 Encounters:   02/05/21 92 kg (202 lb 13 2 oz)       · Echo with an EF of 40% moderate diffuse hypokinesis with grade 2 diastolic dysfunction and a PA pressure of 50  · Cardiology following  · Outpatient ischemic workup  · Continue metoprolol  · Strict I/O  · Daily weight  · At this point euvolemic no need for diuretic

## 2021-02-10 NOTE — ASSESSMENT & PLAN NOTE
· Sepsis, POA, as evidenced by fever 100 6, tachycardia 148, WBC 14, procal 2 33 secondary to acute cholecystitis  · S/p lap ivon  · On IV Zosyn  · Procalcitonin 2 3 > 5 3 > 0 59  · Blood cultures - 1/2 E Coli  · Repeat BC - NG till date  · CT scan showed distended gallbladder   · Right upper quadrant ultrasound; Sludge in the gallbladder which is distended and demonstrates wall thickening    Sonographic Tamayo's sign however is negative   · S/p laparoscopic cholecystectomy 2/8/21  · To continue with Zosyn and transition to ciprofloxacin on discharge to complete a 2 week course of antibiotics for GNR bacteremia  · Yet to move his bowels, unable to DC

## 2021-02-10 NOTE — PROGRESS NOTES
Progress Note - Geni Ortiz 1945, 66 y o  male MRN: [id number]    Unit/Bed#: -01 Encounter: [id number]    Primary Care Provider: No primary care provider on file  Date and time admitted to hospital: 2/5/2021 11:40 AM        Sepsis due to Escherichia coli without acute organ dysfunction (HCC)  Assessment & Plan  · Sepsis, POA, as evidenced by fever 100 6, tachycardia 148, WBC 14, procal 2 33 secondary to acute cholecystitis  · S/p lap ivon  · On IV Zosyn  · Procalcitonin 2 3 > 5 3 > 0 59  · Blood cultures - 1/2 E Coli  · Repeat BC - NG till date  · CT scan showed distended gallbladder   · Right upper quadrant ultrasound; Sludge in the gallbladder which is distended and demonstrates wall thickening    Sonographic Tamayo's sign however is negative   · S/p laparoscopic cholecystectomy 2/8/21  · To continue with Zosyn and transition to ciprofloxacin on discharge to complete a 2 week course of antibiotics for GNR bacteremia  · Yet to move his bowels, unable to DC    Combined systolic and diastolic congestive heart failure (HCC)  Assessment & Plan  Wt Readings from Last 3 Encounters:   02/05/21 92 kg (202 lb 13 2 oz)       · Echo with an EF of 40% moderate diffuse hypokinesis with grade 2 diastolic dysfunction and a PA pressure of 50  · Cardiology following  · Outpatient ischemic workup  · Continue metoprolol  · Strict I/O  · Daily weight  · At this point euvolemic no need for diuretic      Gram-negative bacteremia  Assessment & Plan  · E coli bacteremia, POA, secondary to acute cholecystitis  · BC 1/2 E Coli  · Repeat BC Negative till date  · Cont Zosyn  · Fluid cultures from lap ivon growing e coli as well  · Continue Zosyn till DC, can transition to ciprofloxacin to complete a 2 week course    Hypertension  Assessment & Plan  · Currently normotensive  · On Lopressor      PAF (paroxysmal atrial fibrillation) ([facility] [address] )  Assessment & Plan  · Presented with new onset rapid AFib Now in NSR, converted   · On 75mg Metoprolol Q12 due to frequent PACs and PVCs noted on telemtry  · Resume Heparin gtt, held for the OR   · Can transition to Eliquis today - cleared by surgery   · ECHO - ejection fraction 41%, grade 2 diastolic dysfunction, moderate diffuse hypokinesis    * Acute cholecystitis  Assessment & Plan  · Secondary to acute cholecystitis  · Presented to the ER on  with fevers, chills, abdominal pain x2 days  · WBC count 14 on arrival  · CT scan showed distended gallbladder  · RUQ US "Sludge in the gallbladder which is distended and demonstrates wall thickening"  · S/p Lap ivon   · Continue zosyn      VTE Pharmacologic Prophylaxis:   Pharmacologic: Heparin Drip  Mechanical VTE Prophylaxis in Place: Yes    Patient Centered Rounds: I have performed bedside rounds with nursing staff today  Discussions with Specialists or Other Care Team Provider: CM     Education and Discussions with Family / Patient: patient declined family update    Time Spent for Care: 30 minutes  More than 50% of total time spent on counseling and coordination of care as described above  Current Length of Stay: 5 day(s)    Current Patient Status: Inpatient   Certification Statement: The patient will continue to require additional inpatient hospital stay due to as above    Discharge Plan: pending BM - possibly tomorrow    Code Status: Level 1 - Full Code      Subjective:   No overnight events, yet to move his bowels - abdomen distended  encouraged to ambulate    Objective:     Vitals:   Temp (24hrs), Av 7 °F (36 5 °C), Min:97 6 °F (36 4 °C), Max:97 7 °F (36 5 °C)    Temp:  [97 6 °F (36 4 °C)-97 7 °F (36 5 °C)] 97 7 °F (36 5 °C)  HR:  [73] 73  Resp:  [16-18] 18  BP: (152-153)/(82-85) 152/85  SpO2:  [96 %] 96 %  Body mass index is 30 84 kg/m²  Input and Output Summary (last 24 hours):        Intake/Output Summary (Last 24 hours) at 2/10/2021 1500  Last data filed at 2/10/2021 1025  Gross per 24 hour   Intake 480 ml Output --   Net 480 ml       Physical Exam:     Physical Exam  Vitals signs and nursing note reviewed  Constitutional:       General: He is not in acute distress  Appearance: Normal appearance  He is obese  He is not ill-appearing, toxic-appearing or diaphoretic  Cardiovascular:      Rate and Rhythm: Normal rate and regular rhythm  Pulses: Normal pulses  Heart sounds: No murmur  No gallop  Pulmonary:      Effort: Pulmonary effort is normal  No respiratory distress  Breath sounds: Normal breath sounds  No stridor  No wheezing, rhonchi or rales  Chest:      Chest wall: No tenderness  Abdominal:      General: Bowel sounds are normal  There is distension  Palpations: Abdomen is soft  There is no mass  Tenderness: There is no abdominal tenderness  There is no right CVA tenderness, left CVA tenderness, guarding or rebound  Hernia: No hernia is present  Musculoskeletal: Normal range of motion  General: No swelling or deformity  Right lower leg: No edema  Left lower leg: No edema  Skin:     General: Skin is warm and dry  Capillary Refill: Capillary refill takes less than 2 seconds  Coloration: Skin is not jaundiced or pale  Findings: No bruising, erythema, lesion or rash  Neurological:      General: No focal deficit present  Mental Status: He is alert  Mental status is at baseline  Cranial Nerves: No cranial nerve deficit  Psychiatric:         Mood and Affect: Mood normal          Thought Content:  Thought content normal          Judgment: Judgment normal        Additional Data:     Labs:    Results from last 7 days   Lab Units 02/10/21  0636  02/08/21  0226   WBC Thousand/uL 8 79   < > 6 22   HEMOGLOBIN g/dL 12 9   < > 12 2   HEMATOCRIT % 39 8   < > 37 1   PLATELETS Thousands/uL 225   < > 171   NEUTROS PCT %  --   --  45   LYMPHS PCT %  --   --  40   MONOS PCT %  --   --  11   EOS PCT %  --   --  4    < > = values in this interval not displayed  Results from last 7 days   Lab Units 02/10/21  0636  02/08/21  0226   SODIUM mmol/L 138   < > 138   POTASSIUM mmol/L 3 9   < > 3 8   CHLORIDE mmol/L 104   < > 106   CO2 mmol/L 25   < > 20*   BUN mg/dL 18   < > 13   CREATININE mg/dL 0 89   < > 0 87   ANION GAP mmol/L 9   < > 12   CALCIUM mg/dL 8 6   < > 7 6*   ALBUMIN g/dL  --   --  2 3*   TOTAL BILIRUBIN mg/dL  --   --  0 70   ALK PHOS U/L  --   --  69   ALT U/L  --   --  36   AST U/L  --   --  41   GLUCOSE RANDOM mg/dL 93   < > 87    < > = values in this interval not displayed  Results from last 7 days   Lab Units 02/09/21  1406   INR  1 14             Results from last 7 days   Lab Units 02/10/21  0636 02/06/21  0604 02/05/21  1223   LACTIC ACID mmol/L  --   --  1 6   PROCALCITONIN ng/ml 0 59* 5 34* 2 33*           * I Have Reviewed All Lab Data Listed Above  * Additional Pertinent Lab Tests Reviewed: All Labs Within Last 24 Hours Reviewed    Imaging:    Imaging Reports Reviewed Today Include:   Imaging Personally Reviewed by Myself Includes:      Recent Cultures (last 7 days):     Results from last 7 days   Lab Units 02/08/21  1055 02/06/21  1103 02/06/21  1057 02/05/21  1223   BLOOD CULTURE   --  No Growth at 72 hrs  No Growth at 72 hrs  Escherichia coli*  Escherichia coli*  No Growth After 4 Days     GRAM STAIN RESULT  2+ Gram negative rods*  No polys seen*  --   --  Gram negative rods*   BODY FLUID CULTURE, STERILE  3+ Growth of Escherichia coli*  --   --   --        Last 24 Hours Medication List:   Current Facility-Administered Medications   Medication Dose Route Frequency Provider Last Rate    acetaminophen  650 mg Oral Q6H PRN Petra Herndon PA-C      docusate sodium  100 mg Oral BID Petrawood Herndon PA-C      heparin (porcine)  3-20 Units/kg/hr (Order-Specific) Intravenous Titrated Rachel Kerr MD 12 3 Units/kg/hr (02/10/21 1415)    heparin (porcine)  2,000 Units Intravenous Q1H PRN Rachel Kerr MD     [facility] heparin (porcine)  4,000 Units Intravenous Once Ludwig Moreira MD      heparin (porcine)  4,000 Units Intravenous Q1H PRN Ludwig Moreira MD      HYDROmorphone  0 5 mg Intravenous Q3H PRN Petra Herndon PA-C      levalbuterol  1 25 mg Nebulization Q6H PRN Petra Herndon PA-C      lisinopril  5 mg Oral Daily Ludwig Moreira MD      metoprolol  5 mg Intravenous Q6H PRN Petra Herndon PA-C      metoprolol tartrate  75 mg Oral Q12H [address] Petra RACHEAL Herndon PA-C      oxyCODONE  10 mg Oral Q6H PRN Petra Herndon PA-C      oxyCODONE  5 mg Oral Q4H PRN Petra Herndon PA-C      pantoprazole  40 mg Oral Early Morning Ludwig Moreira MD      piperacillin-tazobactam  3 375 g Intravenous Q6H Ludwig Moreira MD      polyethylene glycol  17 g Oral Daily PRN Ludwig Moreira MD      sodium chloride  3 mL Nebulization Q6H PRN Petra Herndon PA-C          Today, Patient Was Seen By: Ludwig Moreira MD    ** Please Note: Dictation voice to text software may have been used in the creation of this document   **

## 2021-02-10 NOTE — ASSESSMENT & PLAN NOTE
· Presented with new onset rapid AFib Now in NSR, converted 2/6  · On 75mg Metoprolol Q12 due to frequent PACs and PVCs noted on telemtry  · Resume Heparin gtt, held for the OR 2/8  · Can transition to Eliquis today - cleared by surgery   · ECHO - ejection fraction 99%, grade 2 diastolic dysfunction, moderate diffuse hypokinesis

## 2021-02-11 VITALS
TEMPERATURE: 97.4 F | SYSTOLIC BLOOD PRESSURE: 139 MMHG | OXYGEN SATURATION: 97 % | DIASTOLIC BLOOD PRESSURE: 71 MMHG | HEIGHT: 68 IN | BODY MASS INDEX: 30.74 KG/M2 | RESPIRATION RATE: 18 BRPM | WEIGHT: 202.82 LBS | HEART RATE: 69 BPM

## 2021-02-11 LAB
ANION GAP SERPL CALCULATED.3IONS-SCNC: 9 MMOL/L (ref 4–13)
BACTERIA BLD CULT: NORMAL
BACTERIA BLD CULT: NORMAL
BUN SERPL-MCNC: 15 MG/DL (ref 5–25)
CALCIUM SERPL-MCNC: 9.1 MG/DL (ref 8.3–10.1)
CHLORIDE SERPL-SCNC: 103 MMOL/L (ref 100–108)
CO2 SERPL-SCNC: 24 MMOL/L (ref 21–32)
CREAT SERPL-MCNC: 1.02 MG/DL (ref 0.6–1.3)
ERYTHROCYTE [DISTWIDTH] IN BLOOD BY AUTOMATED COUNT: 12.6 % (ref 11.6–15.1)
GFR SERPL CREATININE-BSD FRML MDRD: 70 ML/MIN/1.73SQ M
GLUCOSE SERPL-MCNC: 103 MG/DL (ref 65–140)
HCT VFR BLD AUTO: 43.3 % (ref 36.5–49.3)
HGB BLD-MCNC: 14 G/DL (ref 12–17)
MCH RBC QN AUTO: 31.6 PG (ref 26.8–34.3)
MCHC RBC AUTO-ENTMCNC: 32.3 G/DL (ref 31.4–37.4)
MCV RBC AUTO: 98 FL (ref 82–98)
PLATELET # BLD AUTO: 269 THOUSANDS/UL (ref 149–390)
PMV BLD AUTO: 10.6 FL (ref 8.9–12.7)
POTASSIUM SERPL-SCNC: 4.1 MMOL/L (ref 3.5–5.3)
PROCALCITONIN SERPL-MCNC: 0.31 NG/ML
RBC # BLD AUTO: 4.43 MILLION/UL (ref 3.88–5.62)
SODIUM SERPL-SCNC: 136 MMOL/L (ref 136–145)
WBC # BLD AUTO: 8.34 THOUSAND/UL (ref 4.31–10.16)

## 2021-02-11 PROCEDURE — 80048 BASIC METABOLIC PNL TOTAL CA: CPT | Performed by: PHYSICIAN ASSISTANT

## 2021-02-11 PROCEDURE — 94760 N-INVAS EAR/PLS OXIMETRY 1: CPT

## 2021-02-11 PROCEDURE — 84145 PROCALCITONIN (PCT): CPT | Performed by: INTERNAL MEDICINE

## 2021-02-11 PROCEDURE — 85027 COMPLETE CBC AUTOMATED: CPT | Performed by: PHYSICIAN ASSISTANT

## 2021-02-11 PROCEDURE — 99239 HOSP IP/OBS DSCHRG MGMT >30: CPT | Performed by: INTERNAL MEDICINE

## 2021-02-11 PROCEDURE — 99024 POSTOP FOLLOW-UP VISIT: CPT | Performed by: SURGERY

## 2021-02-11 RX ORDER — CIPROFLOXACIN 500 MG/1
500 TABLET, FILM COATED ORAL EVERY 12 HOURS SCHEDULED
Qty: 16 TABLET | Refills: 0 | Status: SHIPPED | OUTPATIENT
Start: 2021-02-11 | End: 2021-02-19

## 2021-02-11 RX ORDER — LISINOPRIL 5 MG/1
5 TABLET ORAL DAILY
Qty: 30 TABLET | Refills: 0 | Status: SHIPPED | OUTPATIENT
Start: 2021-02-11 | End: 2021-03-13

## 2021-02-11 RX ORDER — METOPROLOL TARTRATE 75 MG/1
75 TABLET, FILM COATED ORAL EVERY 12 HOURS SCHEDULED
Qty: 60 TABLET | Refills: 0 | Status: SHIPPED | OUTPATIENT
Start: 2021-02-11 | End: 2021-03-13

## 2021-02-11 RX ADMIN — DOCUSATE SODIUM 100 MG: 100 CAPSULE, LIQUID FILLED ORAL at 10:20

## 2021-02-11 RX ADMIN — APIXABAN 5 MG: 5 TABLET, FILM COATED ORAL at 10:21

## 2021-02-11 RX ADMIN — PANTOPRAZOLE SODIUM 40 MG: 40 TABLET, DELAYED RELEASE ORAL at 05:16

## 2021-02-11 RX ADMIN — LISINOPRIL 5 MG: 5 TABLET ORAL at 10:21

## 2021-02-11 RX ADMIN — Medication 3.38 G: at 10:21

## 2021-02-11 RX ADMIN — METOPROLOL TARTRATE 75 MG: 25 TABLET ORAL at 10:20

## 2021-02-11 RX ADMIN — Medication 3.38 G: at 02:52

## 2021-02-11 NOTE — ASSESSMENT & PLAN NOTE
· Presented with new onset rapid AFib Now in NSR, converted 2/6  · On 75mg Metoprolol Q12 due to frequent PACs and PVCs noted on telemtry  · ECHO - ejection fraction 16%, grade 2 diastolic dysfunction, moderate diffuse hypokinesis  · Can be discharged on 75mg metoprolol BID and 5mg eliquis BID  · To f/u with cardiology - at the [place]

## 2021-02-11 NOTE — PROGRESS NOTES
Progress Note - General Surgery   Claude Blacksmith 64 y o  male MRN: [id number]  Unit/Bed#: -01 Encounter: [id number]    Assessment:  65 y/o male admitted with bacteremia and new onset afib probable cholecystitis with sludge, probable colangitis  He is now POD 3 s/p Lap Joanne  · Blood Cx have been neg  Gallbladder sludge: - enterococcus faecalis, body fluids cx:- 3+ E coli      Plan:  · Doing well, has not been taking pain meds   · Ambulate well  · Continue Zosyn--Cipro on discharge as per Medicine  · Cardiology following- Eliquis yesterday   · Possibly discharge today      Subjective/Objective     No acute overnight events  Pt feels much better this am  No cp, nausea, vomiting  tolerating diet, + BMs      Objective:     Blood pressure 139/71, pulse 69, temperature (!) 97 4 °F (36 3 °C), temperature source Oral, resp  rate 18, height 5' 8" (1 727 m), weight 92 kg (202 lb 13 2 oz), SpO2 97 %  ,Body mass index is 30 84 kg/m²  Intake/Output Summary (Last 24 hours) at 2/11/2021 0912  Last data filed at 2/10/2021 1655  Gross per 24 hour   Intake 480 ml   Output 250 ml   Net 230 ml       Invasive Devices     Peripheral Intravenous Line            Peripheral IV 02/09/21 Dorsal (posterior); Left Hand 1 day    Peripheral IV 02/09/21 Dorsal (posterior); Right Wrist 1 day                Physical Exam  Constitutional:       Appearance: Normal appearance  HENT:      Head: Normocephalic and atraumatic  Nose: Nose normal       Mouth/Throat:      Mouth: Mucous membranes are moist    Eyes:      Pupils: Pupils are equal, round, and reactive to light  Neck:      Musculoskeletal: Neck supple  Cardiovascular:      Rate and Rhythm: Normal rate  Rhythm irregular  Pulmonary:      Effort: Pulmonary effort is normal    Abdominal:      General: Abdomen is flat  Bowel sounds are normal  There is distension  Palpations: Abdomen is soft  There is no mass  Tenderness: There is no guarding        Hernia: A hernia is present  Comments: Incisions with dermabond, no drainage, no redness  Mild ttp  Mild distention on exam today  Mild ttp   Skin:     General: Skin is warm and dry  Neurological:      Mental Status: He is alert and oriented to person, place, and time     Psychiatric:         Mood and Affect: Mood normal          Behavior: Behavior normal          Lab, Imaging and other studies:  CBC:   Lab Results   Component Value Date    WBC 8 34 02/11/2021    HGB 14 0 02/11/2021    HCT 43 3 02/11/2021    MCV 98 02/11/2021     02/11/2021    MCH 31 6 02/11/2021    MCHC 32 3 02/11/2021    RDW 12 6 02/11/2021    MPV 10 6 02/11/2021   , CMP:   Lab Results   Component Value Date    SODIUM 136 02/11/2021    K 4 1 02/11/2021     02/11/2021    CO2 24 02/11/2021    BUN 15 02/11/2021    CREATININE 1 02 02/11/2021    CALCIUM 9 1 02/11/2021    EGFR 70 02/11/2021     VTE Pharmacologic Prophylaxis: Heparin  VTE Mechanical Prophylaxis: sequential compression device

## 2021-02-11 NOTE — RESPIRATORY THERAPY NOTE
RT Protocol Note  Geni Ortiz 64 y o  male MRN: [id number]  Unit/Bed#: -01 Encounter: [id number]    Assessment    Principal Problem:    Acute cholecystitis  Active Problems:    PAF (paroxysmal atrial fibrillation) (HCC)    Sepsis due to Escherichia coli without acute organ dysfunction (HCC)    Hypertension    Gram-negative bacteremia    Combined systolic and diastolic congestive heart failure (HCC)      Home Pulmonary Medications:         Past Medical History:   Diagnosis Date    Dysphagia     Hypertension      Social History     Socioeconomic History    Marital status: Single     Spouse name: None    Number of children: None    Years of education: None    Highest education level: None   Occupational History    None   Social Needs    Financial resource strain: None    Food insecurity     Worry: None     Inability: None    Transportation needs     Medical: None     Non-medical: None   Tobacco Use    Smoking status: Never Smoker    Smokeless tobacco: Never Used   Substance and Sexual Activity    Alcohol use: Yes     Comment: occasionally    Drug use: Never    Sexual activity: Not Currently   Lifestyle    Physical activity     Days per week: None     Minutes per session: None    Stress: None   Relationships    Social connections     Talks on phone: None     Gets together: None     Attends [employer] service: None     Active member of club or organization: None     Attends meetings of clubs or organizations: None     Relationship status: None    Intimate partner violence     Fear of current or ex partner: None     Emotionally abused: None     Physically abused: None     Forced sexual activity: None   Other Topics Concern    None   Social History Narrative    None       Subjective    Subjective Data: Protocl    Objective    Physical Exam:   Assessment Type: Assess only  General Appearance: Alert, Awake  Respiratory Pattern: Normal  Chest Assessment: Chest expansion symmetrical, Trachea midline  Bilateral Breath Sounds: Clear  Cough: None  O2 Device: RA    Vitals:  Blood pressure 139/71, pulse 69, temperature (!) 97 4 °F (36 3 °C), temperature source Oral, resp  rate 18, height 5' 8" (1 727 m), weight 92 kg (202 lb 13 2 oz), SpO2 97 %  Imaging and other studies: I have personally reviewed pertinent reports        O2 Device: RA     Plan    Respiratory Plan: No distress/Pulmonary history, Discontinue Protocol        Resp Comments: PT is in NRD

## 2021-02-11 NOTE — ASSESSMENT & PLAN NOTE
· E coli bacteremia, POA, secondary to acute cholecystitis  · BC 1/2 E Coli  · Repeat BC Negative till date  · Fluid cultures from lap ivon growing e coli as well  · Continue Zosyn till DC, can transition to ciprofloxacin to complete a 2 week course

## 2021-02-11 NOTE — PLAN OF CARE
Problem: Potential for Falls  Goal: Patient will remain free of falls  Description: INTERVENTIONS:  - Assess patient frequently for physical needs  -  Identify cognitive and physical deficits and behaviors that affect risk of falls    -  [place] fall precautions as indicated by assessment   - Educate patient/family on patient safety including physical limitations  - Instruct patient to call for assistance with activity based on assessment  - Modify environment to reduce risk of injury  - Consider OT/PT consult to assist with strengthening/mobility  Outcome: Progressing     Problem: CARDIOVASCULAR - ADULT  Goal: Maintains optimal cardiac output and hemodynamic stability  Description: INTERVENTIONS:  - Monitor I/O, vital signs and rhythm  - Monitor for S/S and trends of decreased cardiac output  - Administer and titrate ordered vasoactive medications to optimize hemodynamic stability  - Assess quality of pulses, skin color and temperature  - Assess for signs of decreased coronary artery perfusion  - Instruct patient to report change in severity of symptoms  Outcome: Progressing  Goal: Absence of cardiac dysrhythmias or at baseline rhythm  Description: INTERVENTIONS:  - Continuous cardiac monitoring, vital signs, obtain 12 lead EKG if ordered  - Administer antiarrhythmic and heart rate control medications as ordered  - Monitor electrolytes and administer replacement therapy as ordered  Outcome: Progressing     Problem: RESPIRATORY - ADULT  Goal: Achieves optimal ventilation and oxygenation  Description: INTERVENTIONS:  - Assess for changes in respiratory status  - Assess for changes in mentation and behavior  - Position to facilitate oxygenation and minimize respiratory effort  - Oxygen administered by appropriate delivery if ordered  - Initiate smoking cessation education as indicated  - Encourage broncho-pulmonary hygiene including cough, deep breathe, Incentive Spirometry  - Assess the need for suctioning and aspirate as needed  - Assess and instruct to report SOB or any respiratory difficulty  - Respiratory Therapy support as indicated  Outcome: Progressing     Problem: METABOLIC, FLUID AND ELECTROLYTES - ADULT  Goal: Electrolytes maintained within normal limits  Description: INTERVENTIONS:  - Monitor labs and assess patient for signs and symptoms of electrolyte imbalances  - Administer electrolyte replacement as ordered  - Monitor response to electrolyte replacements, including repeat lab results as appropriate  - Instruct patient on fluid and nutrition as appropriate  Outcome: Progressing  Goal: Fluid balance maintained  Description: INTERVENTIONS:  - Monitor labs   - Monitor I/O and WT  - Instruct patient on fluid and nutrition as appropriate  - Assess for signs & symptoms of volume excess or deficit  Outcome: Progressing     Problem: SAFETY ADULT  Goal: Maintain or return to baseline ADL function  Description: INTERVENTIONS:  -  Assess patient's ability to carry out ADLs; assess patient's baseline for ADL function and identify physical deficits which impact ability to perform ADLs (bathing, care of mouth/teeth, toileting, grooming, dressing, etc )  - Assess/evaluate cause of self-care deficits   - Assess range of motion  - Assess patient's mobility; develop plan if impaired  - Assess patient's need for assistive devices and provide as appropriate  - Encourage maximum independence but intervene and supervise when necessary  - Involve family in performance of ADLs  - Assess for home care needs following discharge   - Consider OT consult to assist with ADL evaluation and planning for discharge  - Provide patient education as appropriate  Outcome: Progressing     Problem: Prexisting or High Potential for Compromised Skin Integrity  Goal: Skin integrity is maintained or improved  Description: INTERVENTIONS:  - Identify patients at risk for skin breakdown  - Assess and monitor skin integrity  - Assess and monitor nutrition and hydration status  - Monitor labs   - Assess for incontinence   - Turn and reposition patient  - Assist with mobility/ambulation  - Relieve pressure over bony prominences  - Avoid friction and shearing  - Provide appropriate hygiene as needed including keeping skin clean and dry  - Evaluate need for skin moisturizer/barrier cream  - Collaborate with interdisciplinary team   - Patient/family teaching  - Consider wound care consult   Outcome: Progressing     Problem: Nutrition/Hydration-ADULT  Goal: Nutrient/Hydration intake appropriate for improving, restoring or maintaining nutritional needs  Description: Monitor and assess patient's nutrition/hydration status for malnutrition  Collaborate with interdisciplinary team and initiate plan and interventions as ordered  Monitor patient's weight and dietary intake as ordered or per policy  Utilize nutrition screening tool and intervene as necessary  Determine patient's food preferences and provide high-protein, high-caloric foods as appropriate       INTERVENTIONS:  - Monitor oral intake, urinary output, labs, and treatment plans  - Assess nutrition and hydration status and recommend course of action  - Evaluate amount of meals eaten  - Assist patient with eating if necessary   - Allow adequate time for meals  - Recommend/ encourage appropriate diets, oral nutritional supplements, and vitamin/mineral supplements  - Order, calculate, and assess calorie counts as needed  - Recommend, monitor, and adjust tube feedings and TPN/PPN based on assessed needs  - Assess need for intravenous fluids  - Provide specific nutrition/hydration education as appropriate  - Include patient/family/caregiver in decisions related to nutrition  Outcome: Progressing

## 2021-02-11 NOTE — DISCHARGE INSTRUCTIONS
Jaqueline Mak Instructions                                                Dr Mitzi JEREZ     1  General: Lizbeth 21 will feel pulling sensations around the wound or funny aches and pains around the incisions  This is normal  Even minor surgery is a change in your body and this is your bodys way of reaction to it  If you have had abdominal surgery, it may help to support the incision with a small pillow or blanket for comfort when moving or coughing  2  Wound care:    Bandage/Dressing - Make sure to remove the bandage in about 48 hours, unless instructed otherwise  You usually don't have to redress the wound after 48 hours,unless for comfort  Glue - Leave glue alone, it will fall off on its own, no need for an additional dressings    3  Water: You may shower over the wounds  Do not bathe or use a pool or hot tub until cleared by the physician  You may shower right over the incisions and rinse wound with soapy water but do not scrub incisions  Pat dry when you are done  4  Activity: You may go up and down stairs, walk as much as you are comfortable, but walk at least 3 times each day  If you have had abdominal surgery, do not lift anything heavier than 15 pounds for at least 2 weeks, unless cleared by the doctor  5  Diet: You may resume a regular diet  If you had a same-day surgery or overnight stay surgery, you may wish to eat lightly for a few days: soups, crackers, and sandwiches  You may resume a regular diet when ready  6  Medications: Resume all of your previous medications, unless told otherwise by the doctor  Avoid aspirin products for 2-3 days after the date of surgery  You may, at that time, began to take them again  Tylenol and Ibuprofen are always fine, unless you are taking any narcotic pain medication containing Tylenol (such as Percocet, Darvocet, Vicodin, or anything containing acetaminophen)  Do not take Tylenol if you're taking these medications   You do not need to take the narcotic pain medications unless you are having significant pain and discomfort  7  Driving: You will need someone to drive you home on the day of surgery  Do not drive or make any important decisions while on narcotic pain medication or 24 hours and after anesthesia or sedation for surgery  Generally, you may drive when your off all narcotic pain medications  8  Upset Stomach: You may take Maalox, Tums, or similar items for an upset stomach  If your narcotic pain medication causes an upset stomach, do not take it on an empty stomach  Try taking it with at least some crackers or toast      9  Constipation: Patients often experienced constipation after surgery  You may take over-the-counter medication for this, such as Metamucil, Senokot, Dulcolax, milk of magnesia, etc  You may take a suppository unless you have had anorectal surgery such as a procedure on your hemorrhoids  If you experience significant nausea or vomiting after abdominal surgery, call the office before trying any of these medications  10  Call the office: If you are experiencing any of the following, fevers above 101 5°, significant nausea or vomiting, if the wound develops drainage and/or is excessive redness around the wound, or if you have significant diarrhea or other worsening symptoms  11  Pain: You may be given a prescription for pain  This will be given to the hospital, the day of surgery  12  Sexual Activity: You may resume sexual activity when you feel ready and comfortable and your incision is sealed and healed without apparent infection risk  13  Urination: If you haven't urinated in 6 hours, go directly to the ER for evaluation for urinary retention  14  Follow-up in 2 weeks          [facility]  Phone: [phone]

## 2021-02-11 NOTE — DISCHARGE SUMMARY
Discharge- Gela Mejía 1945, 64 y o  male MRN: [id number]    Unit/Bed#: -01 Encounter: [id number]    Primary Care Provider: No primary care provider on file  Date and time admitted to hospital: 2/5/2021 11:40 AM        Sepsis due to Escherichia coli without acute organ dysfunction [facility])  Assessment & Plan  · Sepsis, POA, as evidenced by fever 100 6, tachycardia 148, WBC 14, procal 2 33 secondary to acute cholecystitis  · CT scan showed distended gallbladder   · Right upper quadrant ultrasound; Sludge in the gallbladder which is distended and demonstrates wall thickening    · S/p lap ivon on 2/8/21 POD #3  · s/p IV Zosyn  · Procalcitonin 2 3 > 5 3 > 0 59  · Blood cultures - 1/2 E Coli  · Repeat BC - NG till date  · Would transition to ciprofloxacin to complete a 2 week course of antibiotics for GNR bacteremia  · Successfully moved his bowels yesterday , cleared for discharge by surgery    Combined systolic and diastolic congestive heart failure (HCC)  Assessment & Plan  Wt Readings from Last 3 Encounters:   02/05/21 92 kg (202 lb 13 2 oz)     · Echo; EF of 40% moderate diffuse hypokinesis with grade 2 diastolic dysfunction and a PA pressure of 50  · Cardiology following  · Outpatient ischemic workup  · Continue metoprolol  · Strict I/O  · Daily weights  · At this point euvolemic no need for diuretics      Gram-negative bacteremia  Assessment & Plan  · E coli bacteremia, POA, secondary to acute cholecystitis  · BC 1/2 E Coli  · Repeat BC Negative till date  · Fluid cultures from lap ivon growing e coli as well  · Continue Zosyn till DC, can transition to ciprofloxacin to complete a 2 week course    Hypertension  Assessment & Plan  · Currently normotensive  · On Lopressor      PAF (paroxysmal atrial fibrillation) ([facility] [address] )  Assessment & Plan  · Presented with new onset rapid AFib Now in NSR, converted 2/6  · On 75mg Metoprolol Q12 due to frequent PACs and PVCs noted on telemtry  · ECHO - ejection fraction 85%, grade 2 diastolic dysfunction, moderate diffuse hypokinesis  · Can be discharged on 75mg metoprolol BID and 5mg eliquis BID  · To f/u with cardiology - at the [place]    * Acute cholecystitis  Assessment & Plan  · Secondary to acute cholecystitis  · Presented to the ER on 02/05 with fevers, chills, abdominal pain x2 days  · WBC count 14 on arrival  · CT scan showed distended gallbladder  · RUQ US "Sludge in the gallbladder which is distended and demonstrates wall thickening"  · S/p Lap ivon 2/8        Discharging Physician / Practitioner: Janina Walter MD  PCP: No primary care provider on file  Admission Date:   Admission Orders (From admission, onward)     Ordered        02/05/21 1504  Inpatient Admission  Once                   Discharge Date: 02/11/21    Resolved Problems  Date Reviewed: 2/9/2021          Resolved    Hyponatremia 2/8/2021     Resolved by  Puneet Sneed, [address] [place] [address] Stay:  · Surgery  · cardiology    Procedures Performed:   · cholecystectomy    Significant Findings / Test Results:   · 2/5/21 CT abd/pelvis    FINDINGS:     ABDOMEN     LOWER CHEST:  Mild basilar emphysema      LIVER/BILIARY TREE:  Subcentimeter sharply circumscribed caudate lobe hypodensity is too small to accurately characterize, but statistically most likely to represent subcentimeter cyst   No suspicious solid hepatic lesion is identified  Liver is mildly   enlarged  Hepatic contours are normal   No biliary dilatation      GALLBLADDER: Gallbladder is distended with possible trace pericholecystic fluid  No calcified gallstones  No pericholecystic inflammatory change      SPLEEN:  Unremarkable      PANCREAS:  Moderate atrophy without acute findings      ADRENAL GLANDS:  Unremarkable      KIDNEYS/URETERS:  The right kidney is absent  The left kidney is unremarkable    Mild left perinephric edema, nonspecific      STOMACH AND BOWEL:    The stomach is grossly unremarkable accounting for degree of distention  The small bowel is normal caliber  Small duodenal diverticulum along the C-loop  Colonic diverticulosis without evidence of diverticulitis      APPENDIX:  A normal appendix is visualized      ABDOMINOPELVIC CAVITY:  No ascites  No pneumoperitoneum      1 1 x 2 3 cm portacaval lymph node series 2/27   8 mm right periaortic lymph node image 31  Shotty right lower quadrant mesenteric lymph nodes  Subcentimeter bilateral pelvic sidewall lymph nodes      VESSELS:  Unremarkable for patient's age      PELVIS     REPRODUCTIVE ORGANS:  Mild prostatomegaly      URINARY BLADDER:  Unremarkable      ABDOMINAL WALL/INGUINAL REGIONS:  Small fat-containing supraumbilical ventral abdominal wall hernia, neck of the defect measures about 1 7 cm in transverse diameter  This is seen just above the level of a tiny fat-containing umbilical hernia      OSSEOUS STRUCTURES:  No acute fracture or destructive osseous lesion  Lumbar dextroscoliosis with multilevel degenerative changes      IMPRESSION:     1  Distended gallbladder with possible trace pericholecystic fluid but no gallstones  Given history, correlation with ultrasound recommended regarding possible cholecystitis      2  Colonic diverticulosis without evidence of diverticulitis      3  Solitary left kidney      4  Mild prostatomegaly      5  Small fat-containing supraumbilical ventral abdominal wall hernia  Incidental Findings:   ·      Test Results Pending at Discharge (will require follow up):   ·      Outpatient Tests Requested:  ·     Complications:      Reason for Admission: Right Upper quadrant pain    Hospital Course:     Geni Ortiz is a 64 y o  male patient with PMH of HTN who originally presented to the hospital on 2/5/2021 due to fevers, chills and RUQ and was found to be septic secondary to E coli bacteremia/acute cholecystitis  He was also in rapid Afib and required IV Cardizem and IV heparin   He converted although still had frequent PVCs thus he was placed on 75mg metoprolol BID  He underwent laparoscopic cholecystectomy on 2/8/21 and post-op course was uneventful  Blood and OR cultures grew E coli and IV zosyn was stopped, and he was placed on oral ciprofloxacin to complete a 2 week course  He had a CHADVASC score of 5 and was placed on Eliquis 5mg BID  Please see above list of diagnoses and related plan for additional information  Condition at Discharge: stable     Discharge Day Visit / Exam:     Subjective:  No overnight events  No complaints this am    Vitals: Blood Pressure: 139/71 (02/11/21 0816)  Pulse: 69 (02/11/21 0826)  Temperature: (!) 97 4 °F (36 3 °C) (02/11/21 0816)  Temp Source: Oral (02/11/21 0816)  Respirations: 18 (02/11/21 0826)  Height: 5' 8" (172 7 cm) (02/05/21 1545)  Weight - Scale: 92 kg (202 lb 13 2 oz) (02/05/21 1545)  SpO2: 97 % (02/11/21 0826)  Exam:   Physical Exam  Vitals signs and nursing note reviewed  Constitutional:       General: He is not in acute distress  Appearance: He is not toxic-appearing  Cardiovascular:      Rate and Rhythm: Normal rate and regular rhythm  Pulses: Normal pulses  Pulmonary:      Effort: Pulmonary effort is normal  No respiratory distress  Breath sounds: Normal breath sounds  No stridor  No wheezing, rhonchi or rales  Chest:      Chest wall: No tenderness  Abdominal:      General: Bowel sounds are normal  There is distension (obese abd)  Palpations: Abdomen is soft  Tenderness: There is no abdominal tenderness  There is no right CVA tenderness, left CVA tenderness or guarding  Musculoskeletal: Normal range of motion  General: No swelling, tenderness, deformity or signs of injury  Right lower leg: No edema  Left lower leg: No edema  Skin:     General: Skin is warm and dry  Capillary Refill: Capillary refill takes less than 2 seconds  Coloration: Skin is not jaundiced        Findings: No bruising, erythema, lesion or rash  Neurological:      General: No focal deficit present  Mental Status: He is alert  Mental status is at baseline  Cranial Nerves: No cranial nerve deficit  Psychiatric:         Mood and Affect: Mood normal      Discussion with Family: Patient declined family update    Discharge instructions/Information to patient and family:   See after visit summary for information provided to patient and family  Provisions for Follow-Up Care:  See after visit summary for information related to follow-up care and any pertinent home health orders  Disposition:     Home    For Discharges to [facility] SNF:   · Not Applicable to this Patient - Not Applicable to this Patient    Planned Readmission: No     Discharge Statement:  I spent 45 minutes discharging the patient  This time was spent on the day of discharge  I had direct contact with the patient on the day of discharge  Greater than 50% of the total time was spent examining patient, answering all patient questions, arranging and discussing plan of care with patient as well as directly providing post-discharge instructions  Additional time then spent on discharge activities  Discharge Medications:  See after visit summary for reconciled discharge medications provided to patient and family        ** Please Note: This note has been constructed using a voice recognition system **

## 2021-02-11 NOTE — ASSESSMENT & PLAN NOTE
Wt Readings from Last 3 Encounters:   02/05/21 92 kg (202 lb 13 2 oz)     · Echo; EF of 40% moderate diffuse hypokinesis with grade 2 diastolic dysfunction and a PA pressure of 50  · Cardiology following  · Outpatient ischemic workup  · Continue metoprolol  · Strict I/O  · Daily weights  · At this point euvolemic no need for diuretics

## 2021-02-11 NOTE — PLAN OF CARE
Problem: Potential for Falls  Goal: Patient will remain free of falls  Description: INTERVENTIONS:  - Assess patient frequently for physical needs  -  Identify cognitive and physical deficits and behaviors that affect risk of falls    -  [facility] fall precautions as indicated by assessment   - Educate patient/family on patient safety including physical limitations  - Instruct patient to call for assistance with activity based on assessment  - Modify environment to reduce risk of injury  - Consider OT/PT consult to assist with strengthening/mobility  Outcome: Progressing     Problem: CARDIOVASCULAR - ADULT  Goal: Maintains optimal cardiac output and hemodynamic stability  Description: INTERVENTIONS:  - Monitor I/O, vital signs and rhythm  - Monitor for S/S and trends of decreased cardiac output  - Administer and titrate ordered vasoactive medications to optimize hemodynamic stability  - Assess quality of pulses, skin color and temperature  - Assess for signs of decreased coronary artery perfusion  - Instruct patient to report change in severity of symptoms  Outcome: Progressing  Goal: Absence of cardiac dysrhythmias or at baseline rhythm  Description: INTERVENTIONS:  - Continuous cardiac monitoring, vital signs, obtain 12 lead EKG if ordered  - Administer antiarrhythmic and heart rate control medications as ordered  - Monitor electrolytes and administer replacement therapy as ordered  Outcome: Progressing     Problem: RESPIRATORY - ADULT  Goal: Achieves optimal ventilation and oxygenation  Description: INTERVENTIONS:  - Assess for changes in respiratory status  - Assess for changes in mentation and behavior  - Position to facilitate oxygenation and minimize respiratory effort  - Oxygen administered by appropriate delivery if ordered  - Initiate smoking cessation education as indicated  - Encourage broncho-pulmonary hygiene including cough, deep breathe, Incentive Spirometry  - Assess the need for suctioning and aspirate as needed  - Assess and instruct to report SOB or any respiratory difficulty  - Respiratory Therapy support as indicated  Outcome: Progressing     Problem: METABOLIC, FLUID AND ELECTROLYTES - ADULT  Goal: Electrolytes maintained within normal limits  Description: INTERVENTIONS:  - Monitor labs and assess patient for signs and symptoms of electrolyte imbalances  - Administer electrolyte replacement as ordered  - Monitor response to electrolyte replacements, including repeat lab results as appropriate  - Instruct patient on fluid and nutrition as appropriate  Outcome: Progressing  Goal: Fluid balance maintained  Description: INTERVENTIONS:  - Monitor labs   - Monitor I/O and WT  - Instruct patient on fluid and nutrition as appropriate  - Assess for signs & symptoms of volume excess or deficit  Outcome: Progressing     Problem: SAFETY ADULT  Goal: Maintain or return to baseline ADL function  Description: INTERVENTIONS:  -  Assess patient's ability to carry out ADLs; assess patient's baseline for ADL function and identify physical deficits which impact ability to perform ADLs (bathing, care of mouth/teeth, toileting, grooming, dressing, etc )  - Assess/evaluate cause of self-care deficits   - Assess range of motion  - Assess patient's mobility; develop plan if impaired  - Assess patient's need for assistive devices and provide as appropriate  - Encourage maximum independence but intervene and supervise when necessary  - Involve family in performance of ADLs  - Assess for home care needs following discharge   - Consider OT consult to assist with ADL evaluation and planning for discharge  - Provide patient education as appropriate  Outcome: Progressing     Problem: Nutrition/Hydration-ADULT  Goal: Nutrient/Hydration intake appropriate for improving, restoring or maintaining nutritional needs  Description: Monitor and assess patient's nutrition/hydration status for malnutrition   Collaborate with interdisciplinary team and initiate plan and interventions as ordered  Monitor patient's weight and dietary intake as ordered or per policy  Utilize nutrition screening tool and intervene as necessary  Determine patient's food preferences and provide high-protein, high-caloric foods as appropriate       INTERVENTIONS:  - Monitor oral intake, urinary output, labs, and treatment plans  - Assess nutrition and hydration status and recommend course of action  - Evaluate amount of meals eaten  - Assist patient with eating if necessary   - Allow adequate time for meals  - Recommend/ encourage appropriate diets, oral nutritional supplements, and vitamin/mineral supplements  - Order, calculate, and assess calorie counts as needed  - Recommend, monitor, and adjust tube feedings and TPN/PPN based on assessed needs  - Assess need for intravenous fluids  - Provide specific nutrition/hydration education as appropriate  - Include patient/family/caregiver in decisions related to nutrition  Outcome: Progressing

## 2021-02-11 NOTE — ASSESSMENT & PLAN NOTE
· Secondary to acute cholecystitis  · Presented to the ER on 02/05 with fevers, chills, abdominal pain x2 days  · WBC count 14 on arrival  · CT scan showed distended gallbladder  · RUQ US "Sludge in the gallbladder which is distended and demonstrates wall thickening"  · S/p Lap ivon 2/8

## 2021-02-11 NOTE — PLAN OF CARE
Problem: Potential for Falls  Goal: Patient will remain free of falls  Description: INTERVENTIONS:  - Assess patient frequently for physical needs  -  Identify cognitive and physical deficits and behaviors that affect risk of falls    -  [place] fall precautions as indicated by assessment   - Educate patient/family on patient safety including physical limitations  - Instruct patient to call for assistance with activity based on assessment  - Modify environment to reduce risk of injury  - Consider OT/PT consult to assist with strengthening/mobility  Outcome: Progressing     Problem: CARDIOVASCULAR - ADULT  Goal: Maintains optimal cardiac output and hemodynamic stability  Description: INTERVENTIONS:  - Monitor I/O, vital signs and rhythm  - Monitor for S/S and trends of decreased cardiac output  - Administer and titrate ordered vasoactive medications to optimize hemodynamic stability  - Assess quality of pulses, skin color and temperature  - Assess for signs of decreased coronary artery perfusion  - Instruct patient to report change in severity of symptoms  Outcome: Progressing  Goal: Absence of cardiac dysrhythmias or at baseline rhythm  Description: INTERVENTIONS:  - Continuous cardiac monitoring, vital signs, obtain 12 lead EKG if ordered  - Administer antiarrhythmic and heart rate control medications as ordered  - Monitor electrolytes and administer replacement therapy as ordered  Outcome: Progressing     Problem: RESPIRATORY - ADULT  Goal: Achieves optimal ventilation and oxygenation  Description: INTERVENTIONS:  - Assess for changes in respiratory status  - Assess for changes in mentation and behavior  - Position to facilitate oxygenation and minimize respiratory effort  - Oxygen administered by appropriate delivery if ordered  - Initiate smoking cessation education as indicated  - Encourage broncho-pulmonary hygiene including cough, deep breathe, Incentive Spirometry  - Assess the need for suctioning and aspirate as needed  - Assess and instruct to report SOB or any respiratory difficulty  - Respiratory Therapy support as indicated  Outcome: Progressing     Problem: METABOLIC, FLUID AND ELECTROLYTES - ADULT  Goal: Electrolytes maintained within normal limits  Description: INTERVENTIONS:  - Monitor labs and assess patient for signs and symptoms of electrolyte imbalances  - Administer electrolyte replacement as ordered  - Monitor response to electrolyte replacements, including repeat lab results as appropriate  - Instruct patient on fluid and nutrition as appropriate  Outcome: Progressing  Goal: Fluid balance maintained  Description: INTERVENTIONS:  - Monitor labs   - Monitor I/O and WT  - Instruct patient on fluid and nutrition as appropriate  - Assess for signs & symptoms of volume excess or deficit  Outcome: Progressing     Problem: Prexisting or High Potential for Compromised Skin Integrity  Goal: Skin integrity is maintained or improved  Description: INTERVENTIONS:  - Identify patients at risk for skin breakdown  - Assess and monitor skin integrity  - Assess and monitor nutrition and hydration status  - Monitor labs   - Assess for incontinence   - Turn and reposition patient  - Assist with mobility/ambulation  - Relieve pressure over bony prominences  - Avoid friction and shearing  - Provide appropriate hygiene as needed including keeping skin clean and dry  - Evaluate need for skin moisturizer/barrier cream  - Collaborate with interdisciplinary team   - Patient/family teaching  - Consider wound care consult   Outcome: Progressing     Problem: Nutrition/Hydration-ADULT  Goal: Nutrient/Hydration intake appropriate for improving, restoring or maintaining nutritional needs  Description: Monitor and assess patient's nutrition/hydration status for malnutrition  Collaborate with interdisciplinary team and initiate plan and interventions as ordered  Monitor patient's weight and dietary intake as ordered or per policy  Utilize nutrition screening tool and intervene as necessary  Determine patient's food preferences and provide high-protein, high-caloric foods as appropriate       INTERVENTIONS:  - Monitor oral intake, urinary output, labs, and treatment plans  - Assess nutrition and hydration status and recommend course of action  - Evaluate amount of meals eaten  - Assist patient with eating if necessary   - Allow adequate time for meals  - Recommend/ encourage appropriate diets, oral nutritional supplements, and vitamin/mineral supplements  - Order, calculate, and assess calorie counts as needed  - Recommend, monitor, and adjust tube feedings and TPN/PPN based on assessed needs  - Assess need for intravenous fluids  - Provide specific nutrition/hydration education as appropriate  - Include patient/family/caregiver in decisions related to nutrition  Outcome: Progressing

## 2021-02-11 NOTE — ASSESSMENT & PLAN NOTE
· Sepsis, POA, as evidenced by fever 100 6, tachycardia 148, WBC 14, procal 2 33 secondary to acute cholecystitis  · CT scan showed distended gallbladder   · Right upper quadrant ultrasound; Sludge in the gallbladder which is distended and demonstrates wall thickening    · S/p lap ivon on 2/8/21 POD #3  · s/p IV Zosyn  · Procalcitonin 2 3 > 5 3 > 0 59  · Blood cultures - 1/2 E Coli  · Repeat BC - NG till date  · Would transition to ciprofloxacin to complete a 2 week course of antibiotics for GNR bacteremia  · Successfully moved his bowels yesterday , cleared for discharge by surgery

## 2021-02-12 ENCOUNTER — TELEPHONE (OUTPATIENT)
Dept: SURGERY | Facility: HOSPITAL | Age: 76
End: 2021-02-12

## 2021-02-12 LAB
BACTERIA SPEC ANAEROBE CULT: ABNORMAL
BACTERIA SPEC ANAEROBE CULT: ABNORMAL

## 2021-02-12 NOTE — TELEPHONE ENCOUNTER
Patient called to set up post op appointment  Asked how patient was feeling after procedure  Patient stated he is doing ok other than some diarrhea  Otherwise doing well  Set up post op appointment  And told patient to call if there are any further questions or concerns

## 2021-02-26 ENCOUNTER — OFFICE VISIT (OUTPATIENT)
Dept: SURGERY | Facility: HOSPITAL | Age: 76
End: 2021-02-26

## 2021-02-26 VITALS
DIASTOLIC BLOOD PRESSURE: 83 MMHG | SYSTOLIC BLOOD PRESSURE: 133 MMHG | HEART RATE: 62 BPM | WEIGHT: 186 LBS | RESPIRATION RATE: 16 BRPM | HEIGHT: 68 IN | TEMPERATURE: 95.3 F | BODY MASS INDEX: 28.19 KG/M2

## 2021-02-26 DIAGNOSIS — Z98.890 POST-OPERATIVE STATE: Primary | ICD-10-CM

## 2021-02-26 DIAGNOSIS — K43.9 VENTRAL HERNIA WITHOUT OBSTRUCTION OR GANGRENE: ICD-10-CM

## 2021-02-26 PROCEDURE — 99024 POSTOP FOLLOW-UP VISIT: CPT | Performed by: PHYSICIAN ASSISTANT

## 2021-02-26 NOTE — PATIENT INSTRUCTIONS
Your recovering well  Continue diet as tolerated  Follow lifting restriction of 25 lb and refrain from strenuous activity until 03/08/2021  At that point he may return to activity as tolerated  Return in 4 weeks to discuss surgical repair of abdominal wall hernias  Call with any questions or concerns

## 2021-02-26 NOTE — PROGRESS NOTES
Assessment/Plan:   Jenny Frias is a 64 y o male who comes in today for postoperative check after   Laparoscopic cholecystectomy done on 2/8/21 for acute cholecystitis and cholangitis and bacteremia on recent hospital admission  He was discharged home on postoperative day 3 with oral antibiotics  Patient has done well since discharge  Denies any right upper quadrant abdominal pain  He is eating well  He has lost some  Weight as he has been sticking to a healthy diet  He has no issues with biliary diarrhea  He is having regular bowel movements  He is following lifting activity restrictions  He has no fevers or chills  He is concerned about abdominal wall hernias noted on his admission  Status post cholecystectomy  - doing well  -ABx course completed  - continue diet as tolerated  - follow lifting restriction of 25 lb until 03/08/2021   -follow-up as needed      Umbilical and periumbilical hernias  - small defects noted on CT scan and seen intraoperatively  - unable to be repaired at time of gallbladder surgery due to  Contaminated field  - patient's abdominal bulging is more related to diastasis, hernia  Defects or small  - denies any significant discomfort, no difficulty with nausea or vomiting, no change in bowel habits  - will have patient return in 4 weeks to discuss surgical repair of ventral hernias  - patient will need cardiac clearance and permission to hold his Eliquis prior to elective procedure     all questions answered  Call with any questions or concerns prior to her follow-up appointment      HPI:  Jenny Frias is a 64 y o male who comes in today for postoperative check after recent  Laparoscopic cholecystectomy as above  Currently doing well without problems, no fever or chills,no nausea and no vomiting  Patient reports they have resumed their normal diet  denies biliary diarrhea  Reports  That he feels well   He admits to following a healthier diet and has lost 15 lb since his procedure  He is having regular bowel movements  He does complain of some bulging in his abdomen and notes that he would like to have hernias that were seen   Intraoperatively repaired  ROS:  General ROS: negative for - chills, fatigue, fever or night sweats, weight loss  Respiratory ROS: no cough, shortness of breath, or wheezing  Cardiovascular ROS: no chest pain or dyspnea on exertion  Abdomen ROS: as per hPI  Genito-Urinary ROS: no dysuria, trouble voiding, or hematuria  Musculoskeletal ROS: negative for - gait disturbance, joint pain or muscle pain  Neurological ROS: no TIA or stroke symptoms  Skin: surgical incisions    ALLERGIES  Pollen extract    Current Outpatient Medications:     apixaban (ELIQUIS) 5 mg, Take 1 tablet (5 mg total) by mouth 2 (two) times a day, Disp: 60 tablet, Rfl: 0    lisinopril (ZESTRIL) 5 mg tablet, Take 1 tablet (5 mg total) by mouth daily, Disp: 30 tablet, Rfl: 0    metoprolol tartrate 75 MG TABS, Take 1 tablet (75 mg total) by mouth every 12 (twelve) hours, Disp: 60 tablet, Rfl: 0    omeprazole (PriLOSEC) 20 mg delayed release capsule, Take 20 mg by mouth daily, Disp: , Rfl:   Past Medical History:   Diagnosis Date    Dysphagia     Hypertension      Past Surgical History:   Procedure Laterality Date    CHOLECYSTECTOMY LAPAROSCOPIC N/A 2/8/2021    Procedure: CHOLECYSTECTOMY LAPAROSCOPIC;  Surgeon: Matthew Easton MD;  Location: [facility];  Service: General    CORONARY STENT PLACEMENT       No family history on file  reports that he has never smoked  He has never used smokeless tobacco  He reports current alcohol use  He reports that he does not use drugs      PHYSICAL EXAM  Vitals:    02/26/21 1032   BP: 133/83   Pulse: 62   Resp: 16   Temp: (!) 95 3 °F (35 2 °C)       General: normal, cooperative, no distress  Abdominal: soft, nondistended or nontender, active BS    Small palpable jody umbilical hernia defect, mild tenderness,  Reducible   midline abdominal wall diastasis  Incision: clean, dry, and intact and healing well    [place] Manuel JAY Browne

## 2021-03-15 ENCOUNTER — TELEPHONE (OUTPATIENT)
Dept: SURGERY | Facility: HOSPITAL | Age: 76
End: 2021-03-15

## 2021-03-15 NOTE — TELEPHONE ENCOUNTER
Left message for patient to call back  Patient must get a referral for [facility] prior to appointment scheduled for 3/29  Told patient to call back to clarify this

## 2021-11-03 ENCOUNTER — APPOINTMENT (EMERGENCY)
Dept: RADIOLOGY | Facility: HOSPITAL | Age: 76
End: 2021-11-03
Payer: COMMERCIAL

## 2021-11-03 ENCOUNTER — HOSPITAL ENCOUNTER (EMERGENCY)
Facility: HOSPITAL | Age: 76
Discharge: HOME | End: 2021-11-03
Attending: EMERGENCY MEDICINE | Admitting: EMERGENCY MEDICINE
Payer: COMMERCIAL

## 2021-11-03 VITALS
DIASTOLIC BLOOD PRESSURE: 69 MMHG | SYSTOLIC BLOOD PRESSURE: 125 MMHG | OXYGEN SATURATION: 94 % | RESPIRATION RATE: 16 BRPM | HEART RATE: 105 BPM | TEMPERATURE: 99.9 F

## 2021-11-03 DIAGNOSIS — M79.10 MYALGIA: Primary | ICD-10-CM

## 2021-11-03 DIAGNOSIS — T50.Z95A ADVERSE EFFECT OF VACCINE, INITIAL ENCOUNTER: ICD-10-CM

## 2021-11-03 LAB
ALBUMIN SERPL BCP-MCNC: 2.9 G/DL (ref 3.5–5)
ALP SERPL-CCNC: 58 U/L (ref 46–116)
ALT SERPL W P-5'-P-CCNC: 21 U/L (ref 12–78)
ANION GAP SERPL CALCULATED.3IONS-SCNC: 14 MMOL/L (ref 4–13)
AST SERPL W P-5'-P-CCNC: 22 U/L (ref 5–45)
BASOPHILS # BLD AUTO: 0.02 THOUSANDS/ΜL (ref 0–0.1)
BASOPHILS NFR BLD AUTO: 0 % (ref 0–1)
BILIRUB SERPL-MCNC: 1.6 MG/DL (ref 0.2–1)
BUN SERPL-MCNC: 14 MG/DL (ref 5–25)
CALCIUM ALBUM COR SERPL-MCNC: 9.1 MG/DL (ref 8.3–10.1)
CALCIUM SERPL-MCNC: 8.2 MG/DL (ref 8.3–10.1)
CHLORIDE SERPL-SCNC: 103 MMOL/L (ref 100–108)
CO2 SERPL-SCNC: 18 MMOL/L (ref 21–32)
CREAT SERPL-MCNC: 1.07 MG/DL (ref 0.6–1.3)
EOSINOPHIL # BLD AUTO: 0.02 THOUSAND/ΜL (ref 0–0.61)
EOSINOPHIL NFR BLD AUTO: 0 % (ref 0–6)
ERYTHROCYTE [DISTWIDTH] IN BLOOD BY AUTOMATED COUNT: 12.6 % (ref 11.6–15.1)
GFR SERPL CREATININE-BSD FRML MDRD: 66 ML/MIN/1.73SQ M
GLUCOSE SERPL-MCNC: 106 MG/DL (ref 65–140)
HCT VFR BLD AUTO: 42.8 % (ref 36.5–49.3)
HGB BLD-MCNC: 13.8 G/DL (ref 12–17)
IMM GRANULOCYTES # BLD AUTO: 0.02 THOUSAND/UL (ref 0–0.2)
IMM GRANULOCYTES NFR BLD AUTO: 0 % (ref 0–2)
LYMPHOCYTES # BLD AUTO: 1.12 THOUSANDS/ΜL (ref 0.6–4.47)
LYMPHOCYTES NFR BLD AUTO: 15 % (ref 14–44)
MCH RBC QN AUTO: 32 PG (ref 26.8–34.3)
MCHC RBC AUTO-ENTMCNC: 32.2 G/DL (ref 31.4–37.4)
MCV RBC AUTO: 99 FL (ref 82–98)
MONOCYTES # BLD AUTO: 0.55 THOUSAND/ΜL (ref 0.17–1.22)
MONOCYTES NFR BLD AUTO: 7 % (ref 4–12)
NEUTROPHILS # BLD AUTO: 5.7 THOUSANDS/ΜL (ref 1.85–7.62)
NEUTS SEG NFR BLD AUTO: 78 % (ref 43–75)
NRBC BLD AUTO-RTO: 0 /100 WBCS
PLATELET # BLD AUTO: 162 THOUSANDS/UL (ref 149–390)
PMV BLD AUTO: 10.1 FL (ref 8.9–12.7)
POTASSIUM SERPL-SCNC: 3.3 MMOL/L (ref 3.5–5.3)
PROT SERPL-MCNC: 6.1 G/DL (ref 6.4–8.2)
RBC # BLD AUTO: 4.31 MILLION/UL (ref 3.88–5.62)
SODIUM SERPL-SCNC: 135 MMOL/L (ref 136–145)
WBC # BLD AUTO: 7.43 THOUSAND/UL (ref 4.31–10.16)

## 2021-11-03 PROCEDURE — 99284 EMERGENCY DEPT VISIT MOD MDM: CPT

## 2021-11-03 PROCEDURE — 80053 COMPREHEN METABOLIC PANEL: CPT | Performed by: EMERGENCY MEDICINE

## 2021-11-03 PROCEDURE — 99284 EMERGENCY DEPT VISIT MOD MDM: CPT | Performed by: EMERGENCY MEDICINE

## 2021-11-03 PROCEDURE — 85025 COMPLETE CBC W/AUTO DIFF WBC: CPT | Performed by: EMERGENCY MEDICINE

## 2021-11-03 PROCEDURE — 36415 COLL VENOUS BLD VENIPUNCTURE: CPT | Performed by: EMERGENCY MEDICINE

## 2021-11-03 PROCEDURE — 96374 THER/PROPH/DIAG INJ IV PUSH: CPT

## 2021-11-03 PROCEDURE — 71045 X-RAY EXAM CHEST 1 VIEW: CPT

## 2021-11-03 PROCEDURE — 96361 HYDRATE IV INFUSION ADD-ON: CPT

## 2021-11-03 RX ORDER — KETOROLAC TROMETHAMINE 30 MG/ML
15 INJECTION, SOLUTION INTRAMUSCULAR; INTRAVENOUS ONCE
Status: COMPLETED | OUTPATIENT
Start: 2021-11-03 | End: 2021-11-03

## 2021-11-03 RX ORDER — POTASSIUM CHLORIDE 20 MEQ/1
40 TABLET, EXTENDED RELEASE ORAL ONCE
Status: COMPLETED | OUTPATIENT
Start: 2021-11-03 | End: 2021-11-03

## 2021-11-03 RX ADMIN — POTASSIUM CHLORIDE 40 MEQ: 1500 TABLET, EXTENDED RELEASE ORAL at 18:54

## 2021-11-03 RX ADMIN — KETOROLAC TROMETHAMINE 15 MG: 30 INJECTION, SOLUTION INTRAMUSCULAR; INTRAVENOUS at 17:50

## 2021-11-03 RX ADMIN — SODIUM CHLORIDE 1000 ML: 0.9 INJECTION, SOLUTION INTRAVENOUS at 17:51

## 2022-01-24 ENCOUNTER — HOSPITAL ENCOUNTER (EMERGENCY)
Facility: HOSPITAL | Age: 77
Discharge: HOME | End: 2022-01-24
Attending: EMERGENCY MEDICINE | Admitting: EMERGENCY MEDICINE
Payer: COMMERCIAL

## 2022-01-24 ENCOUNTER — APPOINTMENT (EMERGENCY)
Dept: RADIOLOGY | Facility: HOSPITAL | Age: 77
End: 2022-01-24
Payer: COMMERCIAL

## 2022-01-24 VITALS
HEART RATE: 79 BPM | OXYGEN SATURATION: 96 % | DIASTOLIC BLOOD PRESSURE: 93 MMHG | HEIGHT: 67 IN | BODY MASS INDEX: 28.25 KG/M2 | SYSTOLIC BLOOD PRESSURE: 182 MMHG | RESPIRATION RATE: 18 BRPM | WEIGHT: 180 LBS | TEMPERATURE: 98 F

## 2022-01-24 DIAGNOSIS — S61.309A TRAUMATIC AVULSION OF NAIL PLATE OF FINGER, INITIAL ENCOUNTER: ICD-10-CM

## 2022-01-24 DIAGNOSIS — S61.211A LACERATION OF LEFT INDEX FINGER: Primary | ICD-10-CM

## 2022-01-24 PROCEDURE — 99283 EMERGENCY DEPT VISIT LOW MDM: CPT

## 2022-01-24 PROCEDURE — 90471 IMMUNIZATION ADMIN: CPT

## 2022-01-24 PROCEDURE — 12001 RPR S/N/AX/GEN/TRNK 2.5CM/<: CPT | Performed by: PHYSICIAN ASSISTANT

## 2022-01-24 PROCEDURE — 73140 X-RAY EXAM OF FINGER(S): CPT

## 2022-01-24 PROCEDURE — 90715 TDAP VACCINE 7 YRS/> IM: CPT | Performed by: PHYSICIAN ASSISTANT

## 2022-01-24 PROCEDURE — 99283 EMERGENCY DEPT VISIT LOW MDM: CPT | Performed by: PHYSICIAN ASSISTANT

## 2022-01-24 RX ORDER — CEPHALEXIN 500 MG/1
500 CAPSULE ORAL EVERY 6 HOURS SCHEDULED
Qty: 20 CAPSULE | Refills: 0 | Status: SHIPPED | OUTPATIENT
Start: 2022-01-24 | End: 2022-01-29

## 2022-01-24 RX ORDER — LIDOCAINE HYDROCHLORIDE 10 MG/ML
10 INJECTION, SOLUTION EPIDURAL; INFILTRATION; INTRACAUDAL; PERINEURAL ONCE
Status: COMPLETED | OUTPATIENT
Start: 2022-01-24 | End: 2022-01-24

## 2022-01-24 RX ORDER — GINSENG 100 MG
1 CAPSULE ORAL ONCE
Status: COMPLETED | OUTPATIENT
Start: 2022-01-24 | End: 2022-01-24

## 2022-01-24 RX ADMIN — LIDOCAINE HYDROCHLORIDE 10 ML: 10 INJECTION, SOLUTION EPIDURAL; INFILTRATION; INTRACAUDAL; PERINEURAL at 17:35

## 2022-01-24 RX ADMIN — TETANUS TOXOID, REDUCED DIPHTHERIA TOXOID AND ACELLULAR PERTUSSIS VACCINE, ADSORBED 0.5 ML: 5; 2.5; 8; 8; 2.5 SUSPENSION INTRAMUSCULAR at 18:16

## 2022-01-24 RX ADMIN — BACITRACIN 1 SMALL APPLICATION: 500 OINTMENT TOPICAL at 18:33

## 2022-01-24 NOTE — ED PROVIDER NOTES
History  Chief Complaint   Patient presents with    Finger Laceration     c/o left hand index and middle finger lacerations related to table saw "kicking back" onset 1530  Patient unsure of TDAP  Patient reports 81mg ASA and Eliquis regimen  Patient is a 77-year-old white who reports distal left 2nd and 3rd finger injury sustained while using a table saw  States the wood kicked back and he was either struck by the wood or the fingers were injured on the blade of the table saw  Patient is right-hand dominant  He is unsure of his last tetanus  He reports no finger numbness, tingling, weakness  He has no other complaints  Prior to Admission Medications   Prescriptions Last Dose Informant Patient Reported? Taking? apixaban (ELIQUIS) 5 mg   No No   Sig: Take 1 tablet (5 mg total) by mouth 2 (two) times a day   lisinopril (ZESTRIL) 5 mg tablet   No No   Sig: Take 1 tablet (5 mg total) by mouth daily   metoprolol tartrate 75 MG TABS   No No   Sig: Take 1 tablet (75 mg total) by mouth every 12 (twelve) hours   omeprazole (PriLOSEC) 20 mg delayed release capsule   Yes No   Sig: Take 20 mg by mouth daily      Facility-Administered Medications: None       Past Medical History:   Diagnosis Date    Dysphagia     Hypertension        Past Surgical History:   Procedure Laterality Date    CHOLECYSTECTOMY LAPAROSCOPIC N/A 2/8/2021    Procedure: CHOLECYSTECTOMY LAPAROSCOPIC;  Surgeon: Joseph Nuñez MD;  Location: [facility];  Service: General    CORONARY STENT PLACEMENT         History reviewed  No pertinent family history  I have reviewed and agree with the history as documented  E-Cigarette/Vaping     E-Cigarette/Vaping Substances    Nicotine No     THC No     CBD No     Flavoring No     Other No     Unknown No      Social History     Tobacco Use    Smoking status: Never Smoker    Smokeless tobacco: Never Used   Vaping Use    Vaping Use: Not on file   Substance Use Topics    Alcohol use:  Yes Alcohol/week: 1 0 standard drink     Types: 1 Glasses of wine per week     Comment: daily    Drug use: Never       Review of Systems   Constitutional: Negative for chills and fever  HENT: Negative for sore throat  Respiratory: Negative for cough and shortness of breath  Cardiovascular: Negative for chest pain and palpitations  Gastrointestinal: Negative for abdominal pain and vomiting  Genitourinary: Negative for dysuria and hematuria  Musculoskeletal: Negative for arthralgias and back pain  Skin: Positive for wound  Negative for color change  All other systems reviewed and are negative  Physical Exam  Physical Exam  Vitals and nursing note reviewed  Constitutional:       General: He is not in acute distress  Appearance: Normal appearance  He is not ill-appearing, toxic-appearing or diaphoretic  Cardiovascular:      Rate and Rhythm: Normal rate and regular rhythm  Pulses: Normal pulses  Heart sounds: Normal heart sounds  Pulmonary:      Effort: Pulmonary effort is normal       Breath sounds: Normal breath sounds  Musculoskeletal:         General: Normal range of motion  Comments: L 2nd finger: distal skin and nailplate avulsion  L: 3rd finger: distal skin and nailplate avulsion with 1 cm adjacent soft tissue laceration  Sensation intact to both fingers  Motor intact to both fingers  Cap refill less than 2 seconds in all fingers  Superficial 0 5 cm distal left 4th fingernail linear abrasion   Skin:     General: Skin is warm and dry  Neurological:      Mental Status: He is alert           Vital Signs  ED Triage Vitals [01/24/22 1701]   Temperature Pulse Respirations Blood Pressure SpO2   98 °F (36 7 °C) 79 18 (!) 182/93 96 %      Temp src Heart Rate Source Patient Position - Orthostatic VS BP Location FiO2 (%)   -- Monitor Sitting Right arm --      Pain Score       --           Vitals:    01/24/22 1701   BP: (!) 182/93   Pulse: 79   Patient Position - Orthostatic VS: Sitting         Visual Acuity      ED Medications  Medications   tetanus-diphtheria-acellular pertussis (BOOSTRIX) IM injection 0 5 mL (0 5 mL Intramuscular Given 1/24/22 1816)   lidocaine (PF) (XYLOCAINE-MPF) 1 % injection 10 mL (10 mL Infiltration Given by Other 1/24/22 1735)   bacitracin topical ointment 1 small application (1 small application Topical Given 1/24/22 1833)       Diagnostic Studies  Results Reviewed     None                 XR finger second digit-index LEFT   ED Interpretation by Chriss Almonte PA-C (01/24 1848)   No fx identified       XR finger third digit-middle LEFT   ED Interpretation by Chriss Almonte PA-C (01/24 1848)   No fracture identified                  Procedures  Laceration repair    Date/Time: 1/24/2022 6:10 PM  Performed by: Chriss Almonte PA-C  Authorized by: Chriss Almonte PA-C   Consent: Verbal consent obtained  Written consent not obtained  Risks and benefits: risks, benefits and alternatives were discussed  Consent given by: patient  Patient understanding: patient states understanding of the procedure being performed  Patient consent: the patient's understanding of the procedure matches consent given  Procedure consent: procedure consent matches procedure scheduled  Relevant documents: relevant documents present and verified  Test results: test results available and properly labeled  Site marked: the operative site was marked  Radiology Images displayed and confirmed  If images not available, report reviewed: imaging studies available  Patient identity confirmed: verbally with patient and arm band  Time out: Immediately prior to procedure a "time out" was called to verify the correct patient, procedure, equipment, support staff and site/side marked as required    Body area: upper extremity  Location details: left index finger  Laceration length: 2 cm  Tendon involvement: none  Nerve involvement: none  Vascular damage: no  Anesthesia: digital block    Anesthesia:  Local Anesthetic: lidocaine 1% without epinephrine  Anesthetic total: 3 mL    Sedation:  Patient sedated: no      Wound Dehiscence:  Superficial Wound Dehiscence: simple closure      Procedure Details:  Preparation: Patient was prepped and draped in the usual sterile fashion  Irrigation solution: saline  Irrigation method: syringe  Amount of cleaning: standard  Debridement: none  Degree of undermining: none  Skin closure: 4-0 nylon  Number of sutures: 4  Technique: simple  Approximation: close  Approximation difficulty: simple  Dressing: antibiotic ointment and non-adhesive packing strip  Patient tolerance: patient tolerated the procedure well with no immediate complications               ED Course                               SBIRT 22yo+      Most Recent Value   SBIRT (24 yo +)    In order to provide better care to our patients, we are screening all of our patients for alcohol and drug use  Would it be okay to ask you these screening questions? Yes Filed at: 01/24/2022 1700   Initial Alcohol Screen: US AUDIT-C     1  How often do you have a drink containing alcohol? 1 Filed at: 01/24/2022 1700   2  How many drinks containing alcohol do you have on a typical day you are drinking? 1 Filed at: 01/24/2022 1700   3a  Male UNDER 65: How often do you have five or more drinks on one occasion? 0 Filed at: 01/24/2022 1700   Audit-C Score 2 Filed at: 01/24/2022 1700   EFREM: How many times in the past year have you    Used an illegal drug or used a prescription medication for non-medical reasons? Never Filed at: 01/24/2022 1700                    MDM  Number of Diagnoses or Management Options  Laceration of left index finger: new and requires workup  Traumatic avulsion of nail plate of finger, initial encounter: new and requires workup  Diagnosis management comments: No indication for suture repair of distal left 3rd finger injury which is a partial distal nail plate and skin avulsion    Distal left 2nd finger injury was sutured with 4 x4-0 Ethilon sutures after digital block anesthesia with 1% plain lidocaine and standard wound prep and irrigation     Wound care instructions given and patient to follow-up with [facility] for suture removal in 10 days       Amount and/or Complexity of Data Reviewed  Tests in the radiology section of CPT®: reviewed  Decide to obtain previous medical records or to obtain history from someone other than the patient: yes  Review and summarize past medical records: yes  Independent visualization of images, tracings, or specimens: yes    Risk of Complications, Morbidity, and/or Mortality  Presenting problems: low  Diagnostic procedures: low  Management options: low    Patient Progress  Patient progress: stable      Disposition  Final diagnoses:   Laceration of left index finger   Traumatic avulsion of nail plate of finger, initial encounter     Time reflects when diagnosis was documented in both MDM as applicable and the Disposition within this note     Time User Action Codes Description Comment    1/24/2022  6:52 PM Alin Lorenz Add [F97 507R] Laceration of left index finger     1/24/2022  6:53 PM Contiliano, Madison Filler Add [S61 309A] Traumatic avulsion of nail plate of finger, initial encounter       ED Disposition     ED Disposition Condition Date/Time Comment    Discharge Stable Mon Jan 24, 2022  6:52 PM Artur Cough discharge to home/self care              Follow-up Information     Follow up With Specialties Details Why Contact Info Additional Information     [address] Emergency Department Emergency Medicine   [address] [place] [address]  [address] [facility] Emergency Department, [address] [place] [address] [address]          Patient's Medications   Discharge Prescriptions    CEPHALEXIN (KEFLEX) 500 MG CAPSULE    Take 1 capsule (500 mg total) by mouth every 6 (six) hours for 5 days Start Date: 1/24/2022 End Date: 1/29/2022       Order Dose: 500 mg       Quantity: 20 capsule    Refills: 0       No discharge procedures on file      PDMP Review     None          ED Provider  Electronically Signed by           Serena Lowry PA-C  01/24/22 2336

## 2022-01-24 NOTE — DISCHARGE INSTRUCTIONS
Keep sutures dry when bathing  Topical antibiotic ointment, new dressing and reapply splint daily    Suture removal in 10 days at your primary care provider [facility])    Return to the ED for fever, signs of wound infection

## 2022-01-24 NOTE — ED NOTES
Non adherent dry dressing applied to left first finger and second finger  Finger splint applied to second finger  Given instructions on care of wound, sutures and home care  Given instruction for follow up and S&S of infection to look for  Pt reports positive sensation , positive pulses and warm good color to hands and fingers        Candido Malcolm, JEROME  01/24/22 1322

## 2022-01-25 ENCOUNTER — HOSPITAL ENCOUNTER (EMERGENCY)
Facility: HOSPITAL | Age: 77
Discharge: HOME | End: 2022-01-25
Attending: EMERGENCY MEDICINE
Payer: COMMERCIAL

## 2022-01-25 VITALS
OXYGEN SATURATION: 97 % | HEART RATE: 75 BPM | SYSTOLIC BLOOD PRESSURE: 145 MMHG | TEMPERATURE: 96.8 F | RESPIRATION RATE: 18 BRPM | DIASTOLIC BLOOD PRESSURE: 82 MMHG

## 2022-01-25 DIAGNOSIS — Z51.89 VISIT FOR WOUND CHECK: Primary | ICD-10-CM

## 2022-01-25 PROCEDURE — 99282 EMERGENCY DEPT VISIT SF MDM: CPT

## 2022-01-25 PROCEDURE — 99281 EMR DPT VST MAYX REQ PHY/QHP: CPT | Performed by: PHYSICIAN ASSISTANT

## 2022-01-25 NOTE — DISCHARGE INSTRUCTIONS
Call wound care today  Keep bandages on until seen by wound care  Return to ER if bleeding continues

## 2022-01-25 NOTE — ED PROVIDER NOTES
History  Chief Complaint   Patient presents with    Wound Check     was seen here yesterday for lacerations on L 2nd and 3rd fingers  States bleeding hasn't stopped  Pt on eliquis     Patient is a 63 y/o M with h/o HTN, heart disease on Eliquis that presents to the ED for wound check  He states he was seen in the ER yesterday and had sutures placed and he is bleeding through the bandages  Will change bandages and refer to wound care  History provided by:  Patient  Wound Check   He was treated in the ED yesterday  Previous treatment in the ED includes laceration repair  There has been no treatment since the wound repair  There has been bloody discharge from the wound  There is no redness present  There is no swelling present  The pain has not changed  Prior to Admission Medications   Prescriptions Last Dose Informant Patient Reported? Taking? apixaban (ELIQUIS) 5 mg   No No   Sig: Take 1 tablet (5 mg total) by mouth 2 (two) times a day   cephalexin (KEFLEX) 500 mg capsule   No No   Sig: Take 1 capsule (500 mg total) by mouth every 6 (six) hours for 5 days   lisinopril (ZESTRIL) 5 mg tablet   No No   Sig: Take 1 tablet (5 mg total) by mouth daily   metoprolol tartrate 75 MG TABS   No No   Sig: Take 1 tablet (75 mg total) by mouth every 12 (twelve) hours   omeprazole (PriLOSEC) 20 mg delayed release capsule   Yes No   Sig: Take 20 mg by mouth daily      Facility-Administered Medications: None       Past Medical History:   Diagnosis Date    Dysphagia     Hypertension        Past Surgical History:   Procedure Laterality Date    CHOLECYSTECTOMY LAPAROSCOPIC N/A 2/8/2021    Procedure: CHOLECYSTECTOMY LAPAROSCOPIC;  Surgeon: Dejah Levy MD;  Location:  MAIN OR;  Service: General    CORONARY STENT PLACEMENT         History reviewed  No pertinent family history  I have reviewed and agree with the history as documented      E-Cigarette/Vaping     E-Cigarette/Vaping Substances    Nicotine No     THC No  CBD No     Flavoring No     Other No     Unknown No      Social History     Tobacco Use    Smoking status: Never Smoker    Smokeless tobacco: Never Used   Vaping Use    Vaping Use: Not on file   Substance Use Topics    Alcohol use: Yes     Alcohol/week: 1 0 standard drink     Types: 1 Glasses of wine per week     Comment: daily    Drug use: Never       Review of Systems   Constitutional: Negative for chills and fever  Skin: Positive for wound  Neurological: Negative for dizziness, weakness and numbness  All other systems reviewed and are negative  Physical Exam  Physical Exam  Vitals and nursing note reviewed  Constitutional:       General: He is not in acute distress  Appearance: Normal appearance  He is well-developed and well-groomed  He is not ill-appearing or diaphoretic  HENT:      Head: Normocephalic and atraumatic  Right Ear: External ear normal       Left Ear: External ear normal    Eyes:      Conjunctiva/sclera: Conjunctivae normal    Cardiovascular:      Rate and Rhythm: Normal rate  Pulmonary:      Effort: Pulmonary effort is normal    Musculoskeletal:      Cervical back: Normal range of motion  Comments: Bandages to left 2nd and 3rd digits  The second digit bandage has a small amount of blood on it, 3rd digit bandage is saturated with blood  WIll changes bandages and refer to wound care  Skin:     General: Skin is warm and dry  Comments: Skin avulsion left 2nd digit - healing without signs of infection  Laceration to tip of left 3rd digit, mild oozing  Will clean and rebandage and refer to wound care  Neurological:      Mental Status: He is alert  Psychiatric:         Behavior: Behavior is cooperative           Vital Signs  ED Triage Vitals [01/25/22 1036]   Temperature Pulse Respirations Blood Pressure SpO2   (!) 96 8 °F (36 °C) 75 18 145/82 97 %      Temp Source Heart Rate Source Patient Position - Orthostatic VS BP Location FiO2 (%) Temporal Monitor Sitting Right arm --      Pain Score       No Pain           Vitals:    01/25/22 1036   BP: 145/82   Pulse: 75   Patient Position - Orthostatic VS: Sitting         Visual Acuity      ED Medications  Medications - No data to display    Diagnostic Studies  Results Reviewed     None                 No orders to display              Procedures  Procedures         ED Course     1039:  Left 3rd and 2nd digits, bandages removed, fingers cleaned with saline  2nd digit, telfa and bandage reapplied  3rd digit has small amount of bleeding to dorsal finger at nail bed, surgifoam and dressing applied  MDM  Number of Diagnoses or Management Options  Visit for wound check: minor  Diagnosis management comments: Patient bleeding through bandages, will clean, apply surgifoam and rebandage and refer to wound care  Patient Progress  Patient progress: improved      Disposition  Final diagnoses:   Visit for wound check     Time reflects when diagnosis was documented in both MDM as applicable and the Disposition within this note     Time User Action Codes Description Comment    1/25/2022 10:48 AM Haydee Huston Add [Z51 89] Visit for wound check       ED Disposition     ED Disposition Condition Date/Time Comment    Discharge Stable Tue Jan 25, 2022 10:48 AM Pauly Gr discharge to home/self care  Follow-up Information     Follow up With Specialties Details Why Contact Info Additional Information    Zane Call today For recheck [address] [facility]  [address] [place] [address]  [phone] [facility] [address], [place] , [place], [place], [address]          Patient's Medications   Discharge Prescriptions    No medications on file       No discharge procedures on file      PDMP Review     None          ED Provider  Electronically Signed by           Emy Marcano PA-C  01/25/22 9314

## 2022-07-07 ENCOUNTER — HOSPITAL ENCOUNTER (EMERGENCY)
Facility: HOSPITAL | Age: 77
Discharge: HOME | End: 2022-07-07
Attending: EMERGENCY MEDICINE
Payer: COMMERCIAL

## 2022-07-07 ENCOUNTER — APPOINTMENT (EMERGENCY)
Dept: RADIOLOGY | Facility: HOSPITAL | Age: 77
End: 2022-07-07
Payer: COMMERCIAL

## 2022-07-07 VITALS
HEART RATE: 71 BPM | RESPIRATION RATE: 18 BRPM | HEIGHT: 67 IN | SYSTOLIC BLOOD PRESSURE: 145 MMHG | TEMPERATURE: 98 F | BODY MASS INDEX: 28.25 KG/M2 | OXYGEN SATURATION: 95 % | WEIGHT: 180 LBS | DIASTOLIC BLOOD PRESSURE: 63 MMHG

## 2022-07-07 DIAGNOSIS — S80.10XA CONTUSION OF LOWER LEG: Primary | ICD-10-CM

## 2022-07-07 DIAGNOSIS — S80.819A ABRASION OF LOWER LEG: ICD-10-CM

## 2022-07-07 PROCEDURE — 99283 EMERGENCY DEPT VISIT LOW MDM: CPT

## 2022-07-07 PROCEDURE — 99284 EMERGENCY DEPT VISIT MOD MDM: CPT

## 2022-07-07 PROCEDURE — 73610 X-RAY EXAM OF ANKLE: CPT

## 2022-07-07 PROCEDURE — 73590 X-RAY EXAM OF LOWER LEG: CPT

## 2022-07-07 RX ORDER — GINSENG 100 MG
1 CAPSULE ORAL ONCE
Status: COMPLETED | OUTPATIENT
Start: 2022-07-07 | End: 2022-07-07

## 2022-07-07 RX ADMIN — BACITRACIN 1 SMALL APPLICATION: 500 OINTMENT TOPICAL at 18:15

## 2022-07-07 NOTE — ED PROVIDER NOTES
History  Chief Complaint   Patient presents with    Leg Injury     Patient presents to the ED with c/o left leg wound, states ply wood fell on his left earlier today     Patient is a 76 YOM who presents to the ED for evaluation of lower leg pain and swelling following an accidental impact with plywood  Patient reports that approximately 7 hours ago he was doing work in his shed when a pile of sherin fell and the corner scratched his left leg  Patient states he had only mild pain initially but his lower leg began swelling in the following hours causing pain in his lower leg and ankle  Patient denies any knee pain or hip pain  States he initially had no difficulty bearing weight but now he states it is painful when he walks  He denies any other acute concerns or complaints at this time  Patient is on eliquis  Prior to Admission Medications   Prescriptions Last Dose Informant Patient Reported? Taking? apixaban (ELIQUIS) 5 mg   No No   Sig: Take 1 tablet (5 mg total) by mouth 2 (two) times a day   lisinopril (ZESTRIL) 5 mg tablet   No No   Sig: Take 1 tablet (5 mg total) by mouth daily   metoprolol tartrate 75 MG TABS   No No   Sig: Take 1 tablet (75 mg total) by mouth every 12 (twelve) hours   omeprazole (PriLOSEC) 20 mg delayed release capsule   Yes No   Sig: Take 20 mg by mouth daily      Facility-Administered Medications: None       Past Medical History:   Diagnosis Date    Dysphagia     Hypertension        Past Surgical History:   Procedure Laterality Date    CHOLECYSTECTOMY LAPAROSCOPIC N/A 2/8/2021    Procedure: CHOLECYSTECTOMY LAPAROSCOPIC;  Surgeon: Sandra Castillo MD;  Location: [facility];  Service: General    CORONARY STENT PLACEMENT         History reviewed  No pertinent family history  I have reviewed and agree with the history as documented      E-Cigarette/Vaping     E-Cigarette/Vaping Substances    Nicotine No     THC No     CBD No     Flavoring No     Other No     Unknown No Social History     Tobacco Use    Smoking status: Never Smoker    Smokeless tobacco: Never Used   Substance Use Topics    Alcohol use: Yes     Alcohol/week: 1 0 standard drink     Types: 1 Glasses of wine per week     Comment: daily    Drug use: Never       Review of Systems   Musculoskeletal: Positive for arthralgias (ankle pain left) and myalgias (left lower leg)  Skin: Positive for wound (left lower leg)  All other systems reviewed and are negative  Physical Exam  Physical Exam  Vitals and nursing note reviewed  Constitutional:       Appearance: He is well-developed  HENT:      Head: Normocephalic and atraumatic  Mouth/Throat:      Mouth: Mucous membranes are moist       Pharynx: Oropharynx is clear  Eyes:      Conjunctiva/sclera: Conjunctivae normal    Cardiovascular:      Rate and Rhythm: Normal rate and regular rhythm  Heart sounds: No murmur heard  Pulmonary:      Effort: Pulmonary effort is normal  No respiratory distress  Breath sounds: Normal breath sounds  Abdominal:      Palpations: Abdomen is soft  Tenderness: There is no abdominal tenderness  Musculoskeletal:      Cervical back: Neck supple  Left hip: No deformity or tenderness  Normal range of motion  Normal strength  Left upper leg: No swelling, deformity or tenderness  Left knee: No swelling or deformity  Normal range of motion  No tenderness  Left lower leg: Swelling present  Left ankle: Swelling present  No deformity  No tenderness  Normal range of motion  Left Achilles Tendon: Normal       Left foot: Normal range of motion  No swelling or tenderness  Normal pulse  Legs:       Comments: Left lower leg and ankle bruising  Tenderness on palpation of medial aspect of lower leg  Rull ROM in left ankle, dorsal pedal pulse 2+, distal sensation intact  There is a very superficial laceration present approximately 12cm in length on the medal aspect of the left shin  Bleeding controlled  Skin:     General: Skin is warm and dry  Neurological:      General: No focal deficit present  Mental Status: He is alert and oriented to person, place, and time  Psychiatric:         Mood and Affect: Mood normal          Behavior: Behavior normal              Vital Signs  ED Triage Vitals   Temperature Pulse Respirations Blood Pressure SpO2   07/07/22 1726 07/07/22 1726 07/07/22 1726 07/07/22 1727 07/07/22 1726   98 °F (36 7 °C) 71 18 145/63 95 %      Temp Source Heart Rate Source Patient Position - Orthostatic VS BP Location FiO2 (%)   07/07/22 1726 07/07/22 1726 07/07/22 1726 07/07/22 1726 --   Temporal Monitor Sitting Left arm       Pain Score       07/07/22 1726       No Pain           Vitals:    07/07/22 1726 07/07/22 1727   BP:  145/63   Pulse: 71    Patient Position - Orthostatic VS: Sitting          Visual Acuity      ED Medications  Medications   bacitracin topical ointment 1 small application (1 small application Topical Given 7/7/22 1815)       Diagnostic Studies  Results Reviewed     None                 XR tibia fibula 2 views LEFT   ED Interpretation by Guerline Garcia PA-C (07/07 1812)   No acute fx or dislocation      XR ankle 3+ views LEFT   ED Interpretation by Guerline Garcia PA-C (07/07 1812)   No acute fx or dislocation                 Procedures  Procedures         ED Course                               SBIRT 22yo+    Flowsheet Row Most Recent Value   SBIRT (25 yo +)    In order to provide better care to our patients, we are screening all of our patients for alcohol and drug use  Would it be okay to ask you these screening questions? Yes Filed at: 07/07/2022 1818   Initial Alcohol Screen: US AUDIT-C     1  How often do you have a drink containing alcohol? 0 Filed at: 07/07/2022 1818   2  How many drinks containing alcohol do you have on a typical day you are drinking? 0 Filed at: 07/07/2022 1818   3a  Male UNDER 65:  How often do you have five or more drinks on one occasion? 0 Filed at: 07/07/2022 1818   3b  FEMALE Any Age, or MALE 65+: How often do you have 4 or more drinks on one occassion? 0 Filed at: 07/07/2022 1818   Audit-C Score 0 Filed at: 07/07/2022 1818   EFREM: How many times in the past year have you    Used an illegal drug or used a prescription medication for non-medical reasons? Never Filed at: 07/07/2022 1818                    [facility]  Number of Diagnoses or Management Options  Abrasion of lower leg: new and requires workup  Contusion of lower leg: new and requires workup  Diagnosis management comments: Patient presents to the ED with pain and swelling of his left lower leg and ankle following accidental impact with plywood  Patient has a superficial laceration and abrasion present, bacitracin applied, wound wrapped in ED  X-ray negative for acute fx or dislocation  Extensive discussion regarding home wound care, signs of infection, and ED return precautions including but not limited to significant increases in leg pain, foot discoloration, loss of strength or sensation  Patient advised to follow-up with PCP for recheck in a few days  Patient's PCP is through the [facility] he will make an appointment tomorrow morning  Patient in agreement with plan for follow-up, return precautions, and wound care         Amount and/or Complexity of Data Reviewed  Tests in the radiology section of CPT®: ordered and reviewed  Discuss the patient with other providers: yes    Patient Progress  Patient progress: stable      Disposition  Final diagnoses:   Contusion of lower leg   Abrasion of lower leg     Time reflects when diagnosis was documented in both MDM as applicable and the Disposition within this note     Time User Action Codes Description Comment    7/7/2022  6:22 PM Tracie Epps Add [S80 10XA] Contusion of lower leg     7/7/2022  6:23 PM Tracie Epps Add [S80 819A] Abrasion of lower leg       ED Disposition     ED Disposition   Discharge    Condition   Stable Date/Time   u Jul 7, 2022  6:22 PM    Comment   Bryant Winn discharge to home/self care  Follow-up Information    None         Discharge Medication List as of 7/7/2022  6:23 PM      CONTINUE these medications which have NOT CHANGED    Details   apixaban (ELIQUIS) 5 mg Take 1 tablet (5 mg total) by mouth 2 (two) times a day, Starting Thu 2/11/2021, Until Sat 3/13/2021, Print      lisinopril (ZESTRIL) 5 mg tablet Take 1 tablet (5 mg total) by mouth daily, Starting Thu 2/11/2021, Until Sat 3/13/2021, Print      metoprolol tartrate 75 MG TABS Take 1 tablet (75 mg total) by mouth every 12 (twelve) hours, Starting u 2/11/2021, Until Sat 3/13/2021, Print      omeprazole (PriLOSEC) 20 mg delayed release capsule Take 20 mg by mouth daily, Historical Med             No discharge procedures on file      PDMP Review     None          ED Provider  Electronically Signed by           Alexandre Fung PA-C  07/07/22 8546

## 2022-10-12 PROBLEM — A41.51 SEPSIS DUE TO ESCHERICHIA COLI WITHOUT ACUTE ORGAN DYSFUNCTION (HCC): Status: RESOLVED | Noted: 2021-02-05 | Resolved: 2022-10-12

## 2023-05-18 ENCOUNTER — TELEPHONE (OUTPATIENT)
Dept: OTHER | Facility: OTHER | Age: 78
End: 2023-05-18

## 2023-05-18 NOTE — TELEPHONE ENCOUNTER
Karo from [facility] reports that patient would like to be seen at the [facility] office and in order to get the referral, she needs the NPI number for the office  She would like a call back

## 2023-05-22 ENCOUNTER — TELEPHONE (OUTPATIENT)
Dept: GASTROENTEROLOGY | Facility: CLINIC | Age: 78
End: 2023-05-22

## 2023-05-22 NOTE — TELEPHONE ENCOUNTER
Offered him Dr Luz Lowery, in [facility], on 05/25/23 at 1 pm or Dr Jayant Cortez on 05/26/23, in [facility], at 8 am and told him to call back to schedule   [facility] referral is scanned in and could schedule straight from the referral

## 2023-05-23 ENCOUNTER — OFFICE VISIT (OUTPATIENT)
Dept: GASTROENTEROLOGY | Facility: CLINIC | Age: 78
End: 2023-05-23

## 2023-05-23 ENCOUNTER — TELEPHONE (OUTPATIENT)
Dept: GASTROENTEROLOGY | Facility: CLINIC | Age: 78
End: 2023-05-23

## 2023-05-23 VITALS
WEIGHT: 184 LBS | DIASTOLIC BLOOD PRESSURE: 82 MMHG | HEIGHT: 67 IN | BODY MASS INDEX: 28.88 KG/M2 | SYSTOLIC BLOOD PRESSURE: 124 MMHG

## 2023-05-23 DIAGNOSIS — Z79.01 CHRONIC ANTICOAGULATION: ICD-10-CM

## 2023-05-23 DIAGNOSIS — R13.19 ESOPHAGEAL DYSPHAGIA: Primary | ICD-10-CM

## 2023-05-23 DIAGNOSIS — K21.9 GASTROESOPHAGEAL REFLUX DISEASE WITHOUT ESOPHAGITIS: ICD-10-CM

## 2023-05-23 DIAGNOSIS — Z12.11 SCREENING FOR COLON CANCER: ICD-10-CM

## 2023-05-23 NOTE — PROGRESS NOTES
[address] [place] [facility] - Outpatient Consultation  Guadlupe Essex [de-identified] y o  male MRN: [id number]  Encounter: [id number]    ASSESSMENT AND PLAN:      1  Esophageal dysphagia  20-year-old male with primarily solid food dysphagia history of esophageal stricture and dilatation  This is probably a recurrent stricture  I guess there is a small chance this could be achalasia  Plan to proceed with upper endoscopy  I would recommend doing in the in the hospital given his comorbidities  - EGD; Future    2  Chronic anticoagulation  On Eliquis for atrial fibrillation I would recommend stopping this for 2 days prior to the procedure I believe the risk of doing the procedure on the Eliquis greatly exceeds the small risk of thromboembolic complications of being off the medication  3  Gastroesophageal reflux disease without esophagitis  Stable chronic on omeprazole    4  Screening for colon cancer  Has had previous colonoscopy  Has aged out for surveillance      Followup Appointment: 2 to 3 months  ______________________________________________________________________    Chief Complaint   Patient presents with   • Dysphagia     Referred by Dr Parker Falcon         HPI:   Guadlupe Essex is a [de-identified]y o  year old male who presents with worsening solid food dysphagia for the last several months  He says he has had difficulty with swallowing for the last 30 years has had serial dilatations usually occurring every 3 to 5 years  One time he had a piece of food stuck about 5 years ago and had to have it removed as an emergency by endoscopy  He has no heartburn but does take Prilosec chronically  He is on Eliquis for atrial fibrillation  He has come off it in the past several times to have other procedures done      Historical Information   Past Medical History:   Diagnosis Date   • Dysphagia    • Hypertension      Past Surgical History:   Procedure Laterality Date   • CHOLECYSTECTOMY LAPAROSCOPIC N/A 2/8/2021 "Procedure: CHOLECYSTECTOMY LAPAROSCOPIC;  Surgeon: Colin Cardona MD;  Location:  MAIN OR;  Service: General   • CORONARY STENT PLACEMENT       Social History     Substance and Sexual Activity   Alcohol Use Yes   • Alcohol/week: 1 0 standard drink   • Types: 1 Glasses of wine per week    Comment: daily     Social History     Substance and Sexual Activity   Drug Use Never     Social History     Tobacco Use   Smoking Status Never   Smokeless Tobacco Never     History reviewed  No pertinent family history  Meds/Allergies     Current Outpatient Medications:   •  apixaban (ELIQUIS) 5 mg  •  aspirin (ECOTRIN LOW STRENGTH) 81 mg EC tablet  •  lisinopril (ZESTRIL) 5 mg tablet  •  metoprolol tartrate 75 MG TABS  •  omeprazole (PriLOSEC) 20 mg delayed release capsule    Allergies   Allergen Reactions   • Pollen Extract        PHYSICAL EXAM:    Blood pressure 124/82, height 5' 7\" (1 702 m), weight 83 5 kg (184 lb)  Body mass index is 28 82 kg/m²  General Appearance: NAD, cooperative, alert  Eyes: Anicteric, PERRLA, EOMI  ENT:  Normocephalic, atraumatic, normal mucosa  Neck:  Supple, symmetrical, trachea midline,   Resp:  Clear to auscultation bilaterally; no rales, rhonchi or wheezing; respirations unlabored   CV:  S1 S2, Regular rate and rhythm; no murmur, rub, or gallop  GI:  Soft, non-tender, non-distended; normal bowel sounds; no masses, no organomegaly   Rectal: Deferred  Musculoskeletal: No cyanosis, clubbing or edema  Normal ROM    Skin:  No jaundice, rashes, or lesions   Heme/Lymph: No palpable cervical lymphadenopathy  Psych: Normal affect, good eye contact  Neuro: No gross deficits, AAOx3    Lab Results:   Lab Results   Component Value Date    WBC 7 43 11/03/2021    HGB 13 8 11/03/2021    HCT 42 8 11/03/2021    MCV 99 (H) 11/03/2021     11/03/2021     Lab Results   Component Value Date    K 3 3 (L) 11/03/2021     11/03/2021    CO2 18 (L) 11/03/2021    BUN 14 11/03/2021    CREATININE 1 07 11/03/2021 " CALCIUM 8 2 (L) 11/03/2021    CORRECTEDCA 9 1 11/03/2021    AST 22 11/03/2021    ALT 21 11/03/2021    ALKPHOS 58 11/03/2021    EGFR 66 11/03/2021     Lab Results   Component Value Date    FERRITIN 217 02/05/2021     Lab Results   Component Value Date    LIPASE 42 (L) 02/06/2021       Radiology Results:   No results found  REVIEW OF SYSTEMS:    CONSTITUTIONAL: Denies any fever, chills, rigors, and weight loss  HEENT: No earache or tinnitus  Denies hearing loss or visual disturbances  CARDIOVASCULAR: No chest pain or palpitations  RESPIRATORY: Denies any cough, hemoptysis, shortness of breath or dyspnea on exertion  GASTROINTESTINAL: As noted in the History of Present Illness  GENITOURINARY: No problems with urination  Denies any hematuria or dysuria  NEUROLOGIC: No dizziness or vertigo, denies headaches  MUSCULOSKELETAL: Denies any muscle or joint pain  SKIN: Denies skin rashes or itching  ENDOCRINE: Denies excessive thirst  Denies intolerance to heat or cold  PSYCHOSOCIAL: Denies depression or anxiety  Denies any recent memory loss

## 2023-05-23 NOTE — TELEPHONE ENCOUNTER
Scheduled date of EGD(as of today):8-23-23  Physician performing EGD:Trish   Location of EGD:[facility]  Instructions reviewed with patient by:DAIJA  Clearances: yes   Eliquis

## 2023-06-14 ENCOUNTER — TELEPHONE (OUTPATIENT)
Dept: GASTROENTEROLOGY | Facility: CLINIC | Age: 78
End: 2023-06-14

## 2023-06-14 NOTE — TELEPHONE ENCOUNTER
LVM for pt ok to hold Eliquis prior to procedure  Last dose is 8/20/23  Asked him to call back to confirm he received the message

## 2023-08-09 ENCOUNTER — TELEPHONE (OUTPATIENT)
Dept: GASTROENTEROLOGY | Facility: CLINIC | Age: 78
End: 2023-08-09

## 2023-08-09 NOTE — TELEPHONE ENCOUNTER
Procedure confirmed  Endoscopy     Via: Spoke with patient. Instructions given: Given to Patient at Visit     Prep Given: N/A    Call the office if there are any questions. Also confirmed with pt ok to hold Eliquis. Last dose is 8/20/23.

## 2023-08-22 RX ORDER — SODIUM CHLORIDE, SODIUM LACTATE, POTASSIUM CHLORIDE, CALCIUM CHLORIDE 600; 310; 30; 20 MG/100ML; MG/100ML; MG/100ML; MG/100ML
125 INJECTION, SOLUTION INTRAVENOUS CONTINUOUS
Status: CANCELLED | OUTPATIENT
Start: 2023-08-22

## 2023-08-22 RX ORDER — LIDOCAINE HYDROCHLORIDE 10 MG/ML
0.5 INJECTION, SOLUTION EPIDURAL; INFILTRATION; INTRACAUDAL; PERINEURAL ONCE AS NEEDED
Status: CANCELLED | OUTPATIENT
Start: 2023-08-22

## 2023-08-23 ENCOUNTER — ANESTHESIA (OUTPATIENT)
Dept: GASTROENTEROLOGY | Facility: HOSPITAL | Age: 78
End: 2023-08-23

## 2023-08-23 ENCOUNTER — ANESTHESIA EVENT (OUTPATIENT)
Dept: GASTROENTEROLOGY | Facility: HOSPITAL | Age: 78
End: 2023-08-23

## 2023-08-23 ENCOUNTER — HOSPITAL ENCOUNTER (OUTPATIENT)
Dept: GASTROENTEROLOGY | Facility: HOSPITAL | Age: 78
Setting detail: OUTPATIENT SURGERY
Discharge: HOME | End: 2023-08-23
Attending: INTERNAL MEDICINE | Admitting: INTERNAL MEDICINE
Payer: COMMERCIAL

## 2023-08-23 VITALS
RESPIRATION RATE: 16 BRPM | OXYGEN SATURATION: 96 % | DIASTOLIC BLOOD PRESSURE: 68 MMHG | HEIGHT: 67 IN | BODY MASS INDEX: 28.89 KG/M2 | WEIGHT: 184.08 LBS | TEMPERATURE: 97.6 F | HEART RATE: 75 BPM | SYSTOLIC BLOOD PRESSURE: 127 MMHG

## 2023-08-23 DIAGNOSIS — R13.19 ESOPHAGEAL DYSPHAGIA: ICD-10-CM

## 2023-08-23 PROCEDURE — 88305 TISSUE EXAM BY PATHOLOGIST: CPT | Performed by: PATHOLOGY

## 2023-08-23 PROCEDURE — 43450 DILATE ESOPHAGUS 1/MULT PASS: CPT | Performed by: INTERNAL MEDICINE

## 2023-08-23 PROCEDURE — 43239 EGD BIOPSY SINGLE/MULTIPLE: CPT | Performed by: INTERNAL MEDICINE

## 2023-08-23 RX ORDER — PROPOFOL 10 MG/ML
INJECTION, EMULSION INTRAVENOUS AS NEEDED
Status: DISCONTINUED | OUTPATIENT
Start: 2023-08-23 | End: 2023-08-23

## 2023-08-23 RX ORDER — LIDOCAINE HYDROCHLORIDE 20 MG/ML
INJECTION, SOLUTION EPIDURAL; INFILTRATION; INTRACAUDAL; PERINEURAL AS NEEDED
Status: DISCONTINUED | OUTPATIENT
Start: 2023-08-23 | End: 2023-08-23

## 2023-08-23 RX ORDER — SODIUM CHLORIDE 9 MG/ML
INJECTION, SOLUTION INTRAVENOUS CONTINUOUS PRN
Status: DISCONTINUED | OUTPATIENT
Start: 2023-08-23 | End: 2023-08-23

## 2023-08-23 RX ADMIN — PROPOFOL 30 MG: 10 INJECTION, EMULSION INTRAVENOUS at 11:15

## 2023-08-23 RX ADMIN — SODIUM CHLORIDE: 0.9 INJECTION, SOLUTION INTRAVENOUS at 11:00

## 2023-08-23 RX ADMIN — LIDOCAINE HYDROCHLORIDE 100 MG: 20 INJECTION, SOLUTION EPIDURAL; INFILTRATION; INTRACAUDAL; PERINEURAL at 11:10

## 2023-08-23 RX ADMIN — PROPOFOL 100 MG: 10 INJECTION, EMULSION INTRAVENOUS at 11:12

## 2023-08-23 NOTE — H&P
History and Physical - [facility]  Jackie Owen 78 y.o. male MRN: [id number]    HPI: Jackie Owen is a 78y.o. year old male who presents for EGD for dysphagia dysphagia has improved somewhat. Patient is on Eliquis which she stopped 2 days ago. REVIEW OF SYSTEMS: Per the HPI, and otherwise unremarkable. Historical Information   Past Medical History:   Diagnosis Date   • Dysphagia    • Hypertension      Past Surgical History:   Procedure Laterality Date   • CHOLECYSTECTOMY LAPAROSCOPIC N/A 2/8/2021    Procedure: CHOLECYSTECTOMY LAPAROSCOPIC;  Surgeon: Kelsey Cano MD;  Location:  MAIN OR;  Service: General   • CORONARY STENT PLACEMENT       Social History   Social History     Substance and Sexual Activity   Alcohol Use Yes   • Alcohol/week: 1.0 standard drink of alcohol   • Types: 1 Glasses of wine per week    Comment: daily     Social History     Substance and Sexual Activity   Drug Use Never     Social History     Tobacco Use   Smoking Status Never   Smokeless Tobacco Never     No family history on file. Meds/Allergies       Current Outpatient Medications:   •  aspirin (ECOTRIN LOW STRENGTH) 81 mg EC tablet  •  lisinopril (ZESTRIL) 5 mg tablet  •  metoprolol tartrate 75 MG TABS  •  omeprazole (PriLOSEC) 20 mg delayed release capsule  •  apixaban (ELIQUIS) 5 mg  No current facility-administered medications for this encounter.     Facility-Administered Medications Ordered in Other Encounters:   •  sodium chloride 0.9 % infusion, , Intravenous, Continuous PRN, New Bag at 08/23/23 1100    Allergies   Allergen Reactions   • Pollen Extract        Objective     /73   Pulse 87   Temp 97.6 °F (36.4 °C) (Temporal)   Resp 18   Ht 5' 7" (1.702 m)   Wt 83.5 kg (184 lb 1.4 oz)   SpO2 96%   BMI 28.83 kg/m²     PHYSICAL EXAM    Gen: NAD AAOx3  Head: Normocephalic, Atraumatic  CV: S1S2 RRR no m/r/g  CHEST: Clear b/l no c/r/w  ABD: soft, +BS NT/ND  EXT: no edema    ASSESSMENT/PLAN: This is a 78y.o. year old male here for EGD with possible dilatation, and he is stable and optimized for his procedure.

## 2023-08-23 NOTE — ANESTHESIA PREPROCEDURE EVALUATION
Procedure:  EGD    Relevant Problems   CARDIO   (+) Combined systolic and diastolic congestive heart failure (HCC)   (+) Hypertension   (+) PAF (paroxysmal atrial fibrillation) (HCC)      GI/HEPATIC   (+) Dysphagia        Physical Exam    Airway    Mallampati score: II         Dental   No notable dental hx     Cardiovascular      Pulmonary      Other Findings        Anesthesia Plan  ASA Score- 3     Anesthesia Type- IV sedation with anesthesia with ASA Monitors. Additional Monitors:   Airway Plan:     Comment: I, Dr. Vikram Vazquez, the attending physician, have personally seen and evaluated the patient prior to anesthetic care. I have reviewed the pre-anesthetic record, and other medical records if appropriate to the anesthetic care. If a CRNA is involved in the case, I have reviewed the CRNA assessment, if present, and agree. The patient is in a suitable condition to proceed with my formulated anesthetic plan. .       Plan Factors-    Chart reviewed. Induction- intravenous. Postoperative Plan-     Informed Consent- Anesthetic plan and risks discussed with patient. I personally reviewed this patient with the CRNA. Discussed and agreed on the Anesthesia Plan with the CRNA. Lidia Phalen

## 2023-08-23 NOTE — ANESTHESIA POSTPROCEDURE EVALUATION
Post-Op Assessment Note    CV Status:  Stable  Pain Score: 0    Pain management: adequate     Mental Status:  Sleepy and arousable   Hydration Status:  Euvolemic   PONV Controlled:  None   Airway Patency:  Patent      Post Op Vitals Reviewed: Yes      Staff: CRNA         No notable events documented.     BP      Temp      Pulse     Resp      SpO2

## 2023-08-28 PROCEDURE — 88305 TISSUE EXAM BY PATHOLOGIST: CPT | Performed by: PATHOLOGY

## 2023-08-30 ENCOUNTER — TRANSCRIBE ORDERS (OUTPATIENT)
Dept: GASTROENTEROLOGY | Facility: CLINIC | Age: 78
End: 2023-08-30

## 2023-10-12 ENCOUNTER — APPOINTMENT (EMERGENCY)
Dept: RADIOLOGY | Facility: HOSPITAL | Age: 78
End: 2023-10-12
Payer: COMMERCIAL

## 2023-10-12 ENCOUNTER — HOSPITAL ENCOUNTER (INPATIENT)
Facility: HOSPITAL | Age: 78
LOS: 2 days | Discharge: HOME | End: 2023-10-14
Attending: EMERGENCY MEDICINE | Admitting: INTERNAL MEDICINE
Payer: COMMERCIAL

## 2023-10-12 DIAGNOSIS — I48.0 PAROXYSMAL ATRIAL FIBRILLATION WITH RAPID VENTRICULAR RESPONSE (HCC): Primary | ICD-10-CM

## 2023-10-12 PROBLEM — I48.91 ATRIAL FIBRILLATION WITH RAPID VENTRICULAR RESPONSE (HCC): Status: ACTIVE | Noted: 2023-10-12

## 2023-10-12 PROBLEM — K21.9 GERD (GASTROESOPHAGEAL REFLUX DISEASE): Status: ACTIVE | Noted: 2023-10-12

## 2023-10-12 PROBLEM — I50.42 CHRONIC COMBINED SYSTOLIC AND DIASTOLIC CONGESTIVE HEART FAILURE (HCC): Status: ACTIVE | Noted: 2021-02-09

## 2023-10-12 LAB
2HR DELTA HS TROPONIN: 0 NG/L
ALBUMIN SERPL BCP-MCNC: 4.1 G/DL (ref 3.5–5)
ALP SERPL-CCNC: 48 U/L (ref 34–104)
ALT SERPL W P-5'-P-CCNC: 10 U/L (ref 7–52)
ANION GAP SERPL CALCULATED.3IONS-SCNC: 7 MMOL/L
AST SERPL W P-5'-P-CCNC: 17 U/L (ref 13–39)
ATRIAL RATE: 133 BPM
BASOPHILS # BLD AUTO: 0.05 THOUSANDS/ÂΜL (ref 0–0.1)
BASOPHILS NFR BLD AUTO: 1 % (ref 0–1)
BILIRUB SERPL-MCNC: 0.95 MG/DL (ref 0.2–1)
BILIRUB UR QL STRIP: NEGATIVE
BNP SERPL-MCNC: 396 PG/ML (ref 0–100)
BUN SERPL-MCNC: 19 MG/DL (ref 5–25)
CALCIUM SERPL-MCNC: 9.5 MG/DL (ref 8.4–10.2)
CARDIAC TROPONIN I PNL SERPL HS: 10 NG/L
CARDIAC TROPONIN I PNL SERPL HS: 10 NG/L
CHLORIDE SERPL-SCNC: 106 MMOL/L (ref 96–108)
CLARITY UR: CLEAR
CO2 SERPL-SCNC: 24 MMOL/L (ref 21–32)
COLOR UR: YELLOW
CREAT SERPL-MCNC: 1.1 MG/DL (ref 0.6–1.3)
EOSINOPHIL # BLD AUTO: 0.35 THOUSAND/ÂΜL (ref 0–0.61)
EOSINOPHIL NFR BLD AUTO: 4 % (ref 0–6)
ERYTHROCYTE [DISTWIDTH] IN BLOOD BY AUTOMATED COUNT: 12.8 % (ref 11.6–15.1)
GFR SERPL CREATININE-BSD FRML MDRD: 63 ML/MIN/1.73SQ M
GLUCOSE SERPL-MCNC: 100 MG/DL (ref 65–140)
GLUCOSE UR STRIP-MCNC: NEGATIVE MG/DL
HCT VFR BLD AUTO: 44.7 % (ref 36.5–49.3)
HGB BLD-MCNC: 14.1 G/DL (ref 12–17)
HGB UR QL STRIP.AUTO: NEGATIVE
IMM GRANULOCYTES # BLD AUTO: 0.03 THOUSAND/UL (ref 0–0.2)
IMM GRANULOCYTES NFR BLD AUTO: 0 % (ref 0–2)
KETONES UR STRIP-MCNC: NEGATIVE MG/DL
LEUKOCYTE ESTERASE UR QL STRIP: NEGATIVE
LYMPHOCYTES # BLD AUTO: 3.43 THOUSANDS/ÂΜL (ref 0.6–4.47)
LYMPHOCYTES NFR BLD AUTO: 41 % (ref 14–44)
MAGNESIUM SERPL-MCNC: 1.9 MG/DL (ref 1.9–2.7)
MCH RBC QN AUTO: 31.4 PG (ref 26.8–34.3)
MCHC RBC AUTO-ENTMCNC: 31.5 G/DL (ref 31.4–37.4)
MCV RBC AUTO: 100 FL (ref 82–98)
MONOCYTES # BLD AUTO: 0.77 THOUSAND/ÂΜL (ref 0.17–1.22)
MONOCYTES NFR BLD AUTO: 9 % (ref 4–12)
NEUTROPHILS # BLD AUTO: 3.68 THOUSANDS/ÂΜL (ref 1.85–7.62)
NEUTS SEG NFR BLD AUTO: 45 % (ref 43–75)
NITRITE UR QL STRIP: NEGATIVE
NRBC BLD AUTO-RTO: 0 /100 WBCS
PH UR STRIP.AUTO: 6.5 [PH]
PHOSPHATE SERPL-MCNC: 3.5 MG/DL (ref 2.3–4.1)
PLATELET # BLD AUTO: 191 THOUSANDS/UL (ref 149–390)
PMV BLD AUTO: 10.2 FL (ref 8.9–12.7)
POTASSIUM SERPL-SCNC: 4.2 MMOL/L (ref 3.5–5.3)
PROT SERPL-MCNC: 7 G/DL (ref 6.4–8.4)
PROT UR STRIP-MCNC: NEGATIVE MG/DL
QRS AXIS: 110 DEGREES
QRSD INTERVAL: 140 MS
QT INTERVAL: 350 MS
QTC INTERVAL: 518 MS
RBC # BLD AUTO: 4.49 MILLION/UL (ref 3.88–5.62)
SODIUM SERPL-SCNC: 137 MMOL/L (ref 135–147)
SP GR UR STRIP.AUTO: 1.02 (ref 1–1.03)
T WAVE AXIS: -5 DEGREES
T4 FREE SERPL-MCNC: 1 NG/DL (ref 0.61–1.12)
TSH SERPL DL<=0.05 MIU/L-ACNC: 6.06 UIU/ML (ref 0.45–4.5)
UROBILINOGEN UR STRIP-ACNC: <2 MG/DL
VENTRICULAR RATE: 132 BPM
WBC # BLD AUTO: 8.31 THOUSAND/UL (ref 4.31–10.16)

## 2023-10-12 PROCEDURE — 83735 ASSAY OF MAGNESIUM: CPT | Performed by: EMERGENCY MEDICINE

## 2023-10-12 PROCEDURE — 99285 EMERGENCY DEPT VISIT HI MDM: CPT

## 2023-10-12 PROCEDURE — 84100 ASSAY OF PHOSPHORUS: CPT | Performed by: EMERGENCY MEDICINE

## 2023-10-12 PROCEDURE — 99223 1ST HOSP IP/OBS HIGH 75: CPT | Performed by: INTERNAL MEDICINE

## 2023-10-12 PROCEDURE — 85025 COMPLETE CBC W/AUTO DIFF WBC: CPT | Performed by: EMERGENCY MEDICINE

## 2023-10-12 PROCEDURE — 36415 COLL VENOUS BLD VENIPUNCTURE: CPT | Performed by: EMERGENCY MEDICINE

## 2023-10-12 PROCEDURE — 99291 CRITICAL CARE FIRST HOUR: CPT | Performed by: EMERGENCY MEDICINE

## 2023-10-12 PROCEDURE — 96374 THER/PROPH/DIAG INJ IV PUSH: CPT

## 2023-10-12 PROCEDURE — 80053 COMPREHEN METABOLIC PANEL: CPT | Performed by: EMERGENCY MEDICINE

## 2023-10-12 PROCEDURE — 71045 X-RAY EXAM CHEST 1 VIEW: CPT

## 2023-10-12 PROCEDURE — 83880 ASSAY OF NATRIURETIC PEPTIDE: CPT | Performed by: EMERGENCY MEDICINE

## 2023-10-12 PROCEDURE — 84484 ASSAY OF TROPONIN QUANT: CPT | Performed by: EMERGENCY MEDICINE

## 2023-10-12 PROCEDURE — 93010 ELECTROCARDIOGRAM REPORT: CPT | Performed by: INTERNAL MEDICINE

## 2023-10-12 PROCEDURE — 81003 URINALYSIS AUTO W/O SCOPE: CPT | Performed by: INTERNAL MEDICINE

## 2023-10-12 PROCEDURE — 93005 ELECTROCARDIOGRAM TRACING: CPT

## 2023-10-12 PROCEDURE — 84443 ASSAY THYROID STIM HORMONE: CPT | Performed by: INTERNAL MEDICINE

## 2023-10-12 PROCEDURE — 94664 DEMO&/EVAL PT USE INHALER: CPT

## 2023-10-12 PROCEDURE — 84439 ASSAY OF FREE THYROXINE: CPT | Performed by: INTERNAL MEDICINE

## 2023-10-12 RX ORDER — METOPROLOL TARTRATE 5 MG/5ML
5 INJECTION INTRAVENOUS ONCE
Status: COMPLETED | OUTPATIENT
Start: 2023-10-12 | End: 2023-10-12

## 2023-10-12 RX ORDER — PANTOPRAZOLE SODIUM 40 MG/1
40 TABLET, DELAYED RELEASE ORAL
Status: DISCONTINUED | OUTPATIENT
Start: 2023-10-13 | End: 2023-10-14 | Stop reason: HOSPADM

## 2023-10-12 RX ORDER — ACETAMINOPHEN 325 MG/1
650 TABLET ORAL EVERY 6 HOURS PRN
Status: DISCONTINUED | OUTPATIENT
Start: 2023-10-12 | End: 2023-10-14 | Stop reason: HOSPADM

## 2023-10-12 RX ORDER — METOPROLOL TARTRATE 50 MG/1
50 TABLET, FILM COATED ORAL ONCE
Status: COMPLETED | OUTPATIENT
Start: 2023-10-12 | End: 2023-10-12

## 2023-10-12 RX ORDER — LISINOPRIL 5 MG/1
5 TABLET ORAL DAILY
Status: DISCONTINUED | OUTPATIENT
Start: 2023-10-13 | End: 2023-10-14 | Stop reason: HOSPADM

## 2023-10-12 RX ADMIN — ASPIRIN 81 MG: 81 TABLET, COATED ORAL at 15:04

## 2023-10-12 RX ADMIN — METOPROLOL TARTRATE 75 MG: 50 TABLET, FILM COATED ORAL at 21:11

## 2023-10-12 RX ADMIN — METOPROLOL TARTRATE 5 MG: 1 INJECTION, SOLUTION INTRAVENOUS at 07:23

## 2023-10-12 RX ADMIN — METOPROLOL TARTRATE 5 MG: 1 INJECTION, SOLUTION INTRAVENOUS at 07:33

## 2023-10-12 RX ADMIN — APIXABAN 5 MG: 5 TABLET, FILM COATED ORAL at 17:36

## 2023-10-12 RX ADMIN — METOPROLOL TARTRATE 25 MG: 25 TABLET, FILM COATED ORAL at 07:56

## 2023-10-12 RX ADMIN — METOPROLOL TARTRATE 50 MG: 50 TABLET, FILM COATED ORAL at 09:03

## 2023-10-12 RX ADMIN — APIXABAN 5 MG: 5 TABLET, FILM COATED ORAL at 07:57

## 2023-10-12 NOTE — ASSESSMENT & PLAN NOTE
Wt Readings from Last 3 Encounters:   08/23/23 83.5 kg (184 lb 1.4 oz)   05/23/23 83.5 kg (184 lb)   07/07/22 81.6 kg (180 lb)     Echocardiogram in February 2021 showed ejection fraction of 40% with grade 2 diastolic dysfunction  Not on any diuretics at home  Daily weight and I&O's  Cardiology on board

## 2023-10-12 NOTE — ED PROVIDER NOTES
History  Chief Complaint   Patient presents with    Palpitations     STARTED LAST NIGHT WITH SOB, AND FEELING LIKE HR WAS FAST, hx of a-fib     This is a 78-year-old male with a prior history of A-fib currently on Eliquis who presents with rapid heart rate and shortness of breath since last evening. Admits to generalized fatigue no loss of consciousness no recent fever or cough. History provided by:  Patient and spouse  Medical Problem  Location:  Heart rate  Quality:  Fast  Severity:  Moderate  Onset quality:  Unable to specify  Duration:  10 hours  Timing:  Constant  Progression:  Waxing and waning  Chronicity:  New  Context:  Rapid heart rate and shortness of breath since last evening with a prior history of atrial fibrillation  Worsened by:  Exertion  Associated symptoms: fatigue and shortness of breath    Associated symptoms: no cough and no fever        Prior to Admission Medications   Prescriptions Last Dose Informant Patient Reported? Taking? apixaban (ELIQUIS) 5 mg  Self No No   Sig: Take 1 tablet (5 mg total) by mouth 2 (two) times a day   aspirin (ECOTRIN LOW STRENGTH) 81 mg EC tablet  Self Yes No   Sig: Take 81 mg by mouth daily   lisinopril (ZESTRIL) 5 mg tablet  Self No No   Sig: Take 1 tablet (5 mg total) by mouth daily   metoprolol tartrate 75 MG TABS  Self No No   Sig: Take 1 tablet (75 mg total) by mouth every 12 (twelve) hours   omeprazole (PriLOSEC) 20 mg delayed release capsule  Self Yes No   Sig: Take 20 mg by mouth daily      Facility-Administered Medications: None       Past Medical History:   Diagnosis Date    Dysphagia     Hypertension        Past Surgical History:   Procedure Laterality Date    CHOLECYSTECTOMY LAPAROSCOPIC N/A 2/8/2021    Procedure: CHOLECYSTECTOMY LAPAROSCOPIC;  Surgeon: Jesus Pérez MD;  Location: [facility];  Service: General    CORONARY STENT PLACEMENT         History reviewed. No pertinent family history.   I have reviewed and agree with the history as documented. E-Cigarette/Vaping     E-Cigarette/Vaping Substances    Nicotine No     THC No     CBD No     Flavoring No     Other No     Unknown No      Social History     Tobacco Use    Smoking status: Never    Smokeless tobacco: Never   Substance Use Topics    Alcohol use: Yes     Alcohol/week: 1.0 standard drink of alcohol     Types: 1 Glasses of wine per week     Comment: daily    Drug use: Never       Review of Systems   Constitutional:  Positive for fatigue. Negative for fever. Respiratory:  Positive for shortness of breath. Negative for cough. Neurological:  Positive for weakness. All other systems reviewed and are negative. Physical Exam  Physical Exam  Vitals reviewed. Constitutional:       General: He is not in acute distress. Appearance: He is not ill-appearing, toxic-appearing or diaphoretic. HENT:      Head: Normocephalic. Right Ear: Tympanic membrane, ear canal and external ear normal.      Left Ear: Tympanic membrane, ear canal and external ear normal.   Eyes:      General:         Right eye: No discharge. Left eye: No discharge. Extraocular Movements: Extraocular movements intact. Pupils: Pupils are equal, round, and reactive to light. Cardiovascular:      Rate and Rhythm: Tachycardia present. Rhythm irregular. Heart sounds: No murmur heard. No friction rub. No gallop. Pulmonary:      Effort: Pulmonary effort is normal. No respiratory distress. Breath sounds: No stridor. No wheezing, rhonchi or rales. Abdominal:      General: There is no distension. Palpations: Abdomen is soft. Tenderness: There is no abdominal tenderness. There is no guarding or rebound. Musculoskeletal:         General: No tenderness or signs of injury. Normal range of motion. Cervical back: Normal range of motion and neck supple. No rigidity or tenderness. Right lower leg: Edema present. Left lower leg: Edema present.    Skin:     General: Skin is warm and dry. Coloration: Skin is not jaundiced. Findings: No bruising, erythema or rash. Neurological:      General: No focal deficit present. Mental Status: He is alert and oriented to person, place, and time. Sensory: No sensory deficit. Coordination: Coordination normal.      Gait: Gait normal.   Psychiatric:         Mood and Affect: Mood normal.         Behavior: Behavior normal.         Thought Content:  Thought content normal.         Vital Signs  ED Triage Vitals [10/12/23 0709]   Temperature Pulse Respirations Blood Pressure SpO2   98.3 °F (36.8 °C) (!) 129 18 148/82 97 %      Temp Source Heart Rate Source Patient Position - Orthostatic VS BP Location FiO2 (%)   Temporal Monitor Sitting Right arm --      Pain Score       No Pain           Vitals:    10/12/23 1230 10/12/23 1300 10/12/23 1400 10/12/23 1430   BP: 116/75 128/72 106/65 92/61   Pulse: (!) 115 (!) 120 (!) 118 (!) 113   Patient Position - Orthostatic VS:  Sitting Sitting Sitting         Visual Acuity      ED Medications  Medications   apixaban (ELIQUIS) tablet 5 mg (5 mg Oral Given 10/12/23 0757)   aspirin (ECOTRIN LOW STRENGTH) EC tablet 81 mg (has no administration in time range)   lisinopril (ZESTRIL) tablet 5 mg (has no administration in time range)   pantoprazole (PROTONIX) EC tablet 40 mg (has no administration in time range)   acetaminophen (TYLENOL) tablet 650 mg (has no administration in time range)   metoprolol tartrate (LOPRESSOR) tablet 75 mg (has no administration in time range)   metoprolol (LOPRESSOR) injection 5 mg (5 mg Intravenous Given 10/12/23 0723)   metoprolol (LOPRESSOR) injection 5 mg (5 mg Intravenous Given 10/12/23 0733)   metoprolol tartrate (LOPRESSOR) tablet 25 mg (25 mg Oral Given 10/12/23 8806)   metoprolol tartrate (LOPRESSOR) tablet 50 mg (50 mg Oral Given 10/12/23 0778)       Diagnostic Studies  Results Reviewed       Procedure Component Value Units Date/Time    TSH, 3rd generation [[id number]] Collected: 10/12/23 1434    Lab Status:  In process Specimen: Blood from Arm, Right Updated: 10/12/23 1437    B-Type Natriuretic Peptide(BNP) [[id number]]  (Abnormal) Collected: 10/12/23 0714    Lab Status: Final result Specimen: Blood from Arm, Left Updated: 10/12/23 1045      pg/mL     HS Troponin I 2hr [[id number]]  (Normal) Collected: 10/12/23 0948    Lab Status: Final result Specimen: Blood from Hand, Left Updated: 10/12/23 1034     hs TnI 2hr 10 ng/L      Delta 2hr hsTnI 0 ng/L     HS Troponin 0hr (reflex protocol) [[id number]]  (Normal) Collected: 10/12/23 0714    Lab Status: Final result Specimen: Blood from Arm, Left Updated: 10/12/23 0836     hs TnI 0hr 10 ng/L     Comprehensive metabolic panel [[id number]] Collected: 10/12/23 0714    Lab Status: Final result Specimen: Blood from Arm, Left Updated: 10/12/23 0745     Sodium 137 mmol/L      Potassium 4.2 mmol/L      Chloride 106 mmol/L      CO2 24 mmol/L      ANION GAP 7 mmol/L      BUN 19 mg/dL      Creatinine 1.10 mg/dL      Glucose 100 mg/dL      Calcium 9.5 mg/dL      AST 17 U/L      ALT 10 U/L      Alkaline Phosphatase 48 U/L      Total Protein 7.0 g/dL      Albumin 4.1 g/dL      Total Bilirubin 0.95 mg/dL      eGFR 63 ml/min/1.73sq m     Narrative:      [place] guidelines for Chronic Kidney Disease (CKD):     Stage 1 with normal or high GFR (GFR > 90 mL/min/1.73 square meters)    Stage 2 Mild CKD (GFR = 60-89 mL/min/1.73 square meters)    Stage 3A Moderate CKD (GFR = 45-59 mL/min/1.73 square meters)    Stage 3B Moderate CKD (GFR = 30-44 mL/min/1.73 square meters)    Stage 4 Severe CKD (GFR = 15-29 mL/min/1.73 square meters)    Stage 5 End Stage CKD (GFR <15 mL/min/1.73 square meters)  Note: GFR calculation is accurate only with a steady state creatinine    Phosphorus [[id number]]  (Normal) Collected: 10/12/23 0714    Lab Status: Final result Specimen: Blood from Arm, Left Updated: 10/12/23 0745     Phosphorus 3.5 mg/dL     Magnesium [[id number]]  (Normal) Collected: 10/12/23 0714    Lab Status: Final result Specimen: Blood from Arm, Left Updated: 10/12/23 0745     Magnesium 1.9 mg/dL     CBC and differential [[id number]]  (Abnormal) Collected: 10/12/23 0714    Lab Status: Final result Specimen: Blood from Arm, Left Updated: 10/12/23 0720     WBC 8.31 Thousand/uL      RBC 4.49 Million/uL      Hemoglobin 14.1 g/dL      Hematocrit 44.7 %       fL      MCH 31.4 pg      MCHC 31.5 g/dL      RDW 12.8 %      MPV 10.2 fL      Platelets 662 Thousands/uL      nRBC 0 /100 WBCs      Neutrophils Relative 45 %      Immat GRANS % 0 %      Lymphocytes Relative 41 %      Monocytes Relative 9 %      Eosinophils Relative 4 %      Basophils Relative 1 %      Neutrophils Absolute 3.68 Thousands/µL      Immature Grans Absolute 0.03 Thousand/uL      Lymphocytes Absolute 3.43 Thousands/µL      Monocytes Absolute 0.77 Thousand/µL      Eosinophils Absolute 0.35 Thousand/µL      Basophils Absolute 0.05 Thousands/µL     UA w Reflex to Microscopic w Reflex to Culture [[id number]]     Lab Status: No result Specimen: Urine                    XR chest 1 view portable   ED Interpretation by Theresa Delgadillo DO (10/12 0742)   No acute infiltrate or congestive heart failure      Final Result by Saleem Thao MD (10/12 0837)      No change from earlier study.  No acute pulmonary disease                  Workstation performed: [id number]                    Procedures  ECG 12 Lead Documentation Only    Date/Time: 10/12/2023 7:20 AM    Performed by: Theresa Delgadillo DO  Authorized by: Theresa Delgadillo DO    ECG reviewed by me, the ED Provider: yes    Patient location:  ED  Rate:     ECG rate:  132    ECG rate assessment: tachycardic    Rhythm:     Rhythm: atrial fibrillation    Conduction:     Conduction: abnormal      Abnormal conduction: complete RBBB    ST segments:     ST segments:  Non-specific  CriticalCare Time    Date/Time: 10/12/2023 7:47 AM    Performed by: Aamir Buckley DO  Authorized by: Aamir Buckley DO    Critical care provider statement:     Critical care time (minutes):  30    Critical care time was exclusive of: Rapid atrial fibrillation requiring IV rate control. Critical care was time spent personally by me on the following activities:  Development of treatment plan with patient or surrogate, evaluation of patient's response to treatment, examination of patient, interpretation of cardiac output measurements, ordering and performing treatments and interventions, ordering and review of laboratory studies, ordering and review of radiographic studies and re-evaluation of patient's condition    I assumed direction of critical care for this patient from another provider in my specialty: no             ED Course  ED Course as of 10/12/23 1457   Thu Oct 12, 2023   0724 Patient noted to be in his rapid atrial fibrillation did not take his medication yet this morning. Resting comfortably in no acute distress   0745 Rate better controlled after IV Lopressor   3038 Patient does report that he believes he missed his medications yesterday morning   0817 Slim asked to run this case by cardiology   4089 Cardiology will evaluate patient in the emergency room             HEART Risk Score      Flowsheet Row Most Recent Value   Heart Score Risk Calculator    History 0 Filed at: 10/12/2023 0923   ECG 1 Filed at: 10/12/2023 1211   Age 2 Filed at: 10/12/2023 7574   Risk Factors 1 Filed at: 10/12/2023 0923   Troponin 0 Filed at: 10/12/2023 7684   HEART Score 4 Filed at: 10/12/2023 4794                          SBIRT 20yo+      Flowsheet Row Most Recent Value   Initial Alcohol Screen: US AUDIT-C     1. How often do you have a drink containing alcohol? 0 Filed at: 10/12/2023 1100   2. How many drinks containing alcohol do you have on a typical day you are drinking? 0 Filed at: 10/12/2023 1100   3a. Male UNDER 65:  How often do you have five or more drinks on one occasion? 0 Filed at: 10/12/2023 1100   3b. FEMALE Any Age, or MALE 65+: How often do you have 4 or more drinks on one occassion? 0 Filed at: 10/12/2023 1100   Audit-C Score 0 Filed at: 10/12/2023 1100   EFREM: How many times in the past year have you. .. Used an illegal drug or used a prescription medication for non-medical reasons? Never Filed at: 10/12/2023 1100                      Medical Decision Making  Shortness of breath differential includes congestive heart failure pneumonia cardiac rhythm disturbance work-up in progress including EKG chest x-ray and lab work    Amount and/or Complexity of Data Reviewed  Labs: ordered. Radiology: ordered and independent interpretation performed. Risk  Prescription drug management. Decision regarding hospitalization. Disposition  Final diagnoses:   Paroxysmal atrial fibrillation with rapid ventricular response ([address])     Time reflects when diagnosis was documented in both MDM as applicable and the Disposition within this note       Time User Action Codes Description Comment    10/12/2023  7:22 AM Farhana Files [I48.0] Paroxysmal atrial fibrillation with rapid ventricular response [facility])           ED Disposition       ED Disposition   Admit    Condition   Stable    Date/Time   Thu Oct 12, 2023 1327    Comment   Dr Branden Lacy Case was discussed with Dr Branden Lacy and the patient's admission status was agreed to be Admission Status: inpatient status to the service of Dr. Branden Lacy . Follow-up Information    None         Patient's Medications   Discharge Prescriptions    No medications on file       No discharge procedures on file.     PDMP Review         Value Time User    PDMP Reviewed  Yes 10/12/2023  1:39 PM Carlie Valdivia MD            ED Provider  Electronically Signed by             Nicol Overton DO  10/12/23 [id number]

## 2023-10-12 NOTE — CONSULTS
Consult - Cardiology   Elver Cordero 78 y.o. male MRN: [id number]  Unit/Bed#: ED 08 Encounter: [id number]        Reason For Consult:  Tachycardia  [facility]               ASSESSMENT:  Atrial fibrillation, paroxysmal  Prehospital AC: Eliquis 5 BID  Prehospital AV blocking rx: Metoprolol 50 BID  CAD with prior stenting in 2018   Hypertension  Prehospital Rx: Lisinopril 5 daily   Unspecified cardiomyopathy  02/2021 echo: LVEF 40%    PLAN/ DISCUSSION:     He remains in atrial fibrillation with rates mildly elevated trending . He is comfortable at rest, but per wife at bedside much more fatigued than normal and having shortness of breath. The symptoms are different from his prior stenting. His troponin levels have been negative  He is on Eliquis, but has missed doses recently up to once per week he has possibly missed a dose  2 options here are either rate control with increasing his metoprolol and continuing anticoagulation with subsequent [facility] for [facility] of future DCCV or admitting overnight with increased dose of beta-blocker and if remains in A-fib tomorrow proceed with NANCY cardioversion  Patient and family are undecided on preference  Will discuss with cardiologist    History Of Present Illness:  Esteban Gold is a pleasant 57-year-old gentleman who [facility]. He has no history of atrial fibrillation but reports no prior history of cardioversion or invasive cardiac procedures. He sees his cardiologist regularly. He is maintained on metoprolol and Eliquis. He had previously been on 75 twice a day metoprolol but this was decreased in recent months to 50 twice a day. At his baseline he is a healthy independent 78year-old. He enjoys working around his property and working in the barn. He enjoys golfing. He is fairly active for his age. He had been in his normal state of health up until the last 24-48 hours.   He has been experiencing severe fatigue. He has been experiencing shortness of breath with exertion. According to his wife his activity level is far different from normal.  He has been checking his heart rate at home and noted that it has been elevated in the 140s. As such they came to the ER this morning for evaluation. He was in A-fib with RVR on arrival.  He was given IV and oral metoprolol and heart rate improved to the low 100s. He is fairly comfortable now at rest.  He has no chest pain or chest pressure. Past Medical History:        Past Medical History:   Diagnosis Date    Dysphagia     Hypertension       Past Surgical History:   Procedure Laterality Date    CHOLECYSTECTOMY LAPAROSCOPIC N/A 2/8/2021    Procedure: CHOLECYSTECTOMY LAPAROSCOPIC;  Surgeon: Srinath Michele MD;  Location:  MAIN OR;  Service: General    CORONARY STENT PLACEMENT          Allergy:        Allergies   Allergen Reactions    Pollen Extract        Medications:       Prior to Admission medications    Medication Sig Start Date End Date Taking? Authorizing Provider   apixaban (ELIQUIS) 5 mg Take 1 tablet (5 mg total) by mouth 2 (two) times a day 2/11/21 5/23/23  Gael Briggs MD   aspirin (ECOTRIN LOW STRENGTH) 81 mg EC tablet Take 81 mg by mouth daily    Historical Provider, MD   lisinopril (ZESTRIL) 5 mg tablet Take 1 tablet (5 mg total) by mouth daily 2/11/21 8/23/23  Gael Briggs MD   metoprolol tartrate 75 MG TABS Take 1 tablet (75 mg total) by mouth every 12 (twelve) hours 2/11/21 8/23/23  Gael Briggs MD   omeprazole (PriLOSEC) 20 mg delayed release capsule Take 20 mg by mouth daily    Historical Provider, MD       Family History:     History reviewed. No pertinent family history. Social History:       Social History     Socioeconomic History    Marital status:       Spouse name: None    Number of children: None    Years of education: None    Highest education level: None   Occupational History    None   Tobacco Use Smoking status: Never    Smokeless tobacco: Never   Vaping Use    Vaping Use: None   Substance and Sexual Activity    Alcohol use: Yes     Alcohol/week: 1.0 standard drink of alcohol     Types: 1 Glasses of wine per week     Comment: daily    Drug use: Never    Sexual activity: Not Currently   Other Topics Concern    None   Social History Narrative    None     Social Determinants of Health     Financial Resource Strain: Not on file   Food Insecurity: Not on file   Transportation Needs: Not on file   Physical Activity: Not on file   Stress: Not on file   Social Connections: Not on file   Intimate Partner Violence: Not on file   Housing Stability: Not on file       ROS:  14 point ROS negative except as outlined above  Remainder review of systems is negative    Exam:  General:  alert, oriented and in no distress, cooperative  Head: Normocephalic, atraumatic. Eyes:  EOMI. Pupils - equal, round, reactive to accomodation. No icterus. Normal Conjunctiva. Oropharynx: moist and normal-appearing mucosa  Neck: supple, symmetrical, trachea midline and no JVD  Heart:  irregular, tachycardic, No: murmer, rub or gallop, S1 & S2 normal   Respiratory effort / Chest Inspection: unlabored  Lungs:  normal air entry, lungs clear to auscultation and no rales, rhonchi or wheezing   Abdomen: flat, normal findings: bowel sounds normal and soft, non-tender  Lower Limbs:  no pitting edema  Pulses[de-identified]  RLE - DP: present 2+                 LLE - DP: present 2+  Musculoskeletal: ROM grossly normal        DATA:      ECG:                     Telemetry: Atrial fibrillation. HR           Echocardiogram:           Ischemic Testing:         Weights: Wt Readings from Last 3 Encounters:   08/23/23 83.5 kg (184 lb 1.4 oz)   05/23/23 83.5 kg (184 lb)   07/07/22 81.6 kg (180 lb)   , There is no height or weight on file to calculate BMI.          Lab Studies:             Results from last 7 days   Lab Units 10/12/23  0714   WBC Thousand/uL 8.31 HEMOGLOBIN g/dL 14.1   HEMATOCRIT % 44.7   PLATELETS Thousands/uL 191   ,   Results from last 7 days   Lab Units 10/12/23  0714   POTASSIUM mmol/L 4.2   CHLORIDE mmol/L 106   CO2 mmol/L 24   BUN mg/dL 19   CREATININE mg/dL 1.10   CALCIUM mg/dL 9.5   ALK PHOS U/L 48   ALT U/L 10   AST U/L 17

## 2023-10-12 NOTE — ASSESSMENT & PLAN NOTE
Patient presented with A-fib with RVR  Was given IV Lopressor x2 doses  Heart rates remain still elevated from 90-1 30s  Patient has been having generalized weakness, fatigue and having shortness of breath. Cardiology consult appreciated  Patient is on Eliquis at home but missed doses recently  Cardiology is planning to do NANCY guided cardioversion in a.m.   On Lopressor 75 mg twice daily  Order TSH with free T4  On telemetry  As needed Lopressor  Continue anticoagulation with Eliquis

## 2023-10-13 ENCOUNTER — ANESTHESIA (INPATIENT)
Dept: NON INVASIVE DIAGNOSTICS | Facility: HOSPITAL | Age: 78
End: 2023-10-13
Payer: COMMERCIAL

## 2023-10-13 ENCOUNTER — ANESTHESIA EVENT (INPATIENT)
Dept: NON INVASIVE DIAGNOSTICS | Facility: HOSPITAL | Age: 78
End: 2023-10-13
Payer: COMMERCIAL

## 2023-10-13 ENCOUNTER — APPOINTMENT (INPATIENT)
Dept: NON INVASIVE DIAGNOSTICS | Facility: HOSPITAL | Age: 78
End: 2023-10-13
Payer: COMMERCIAL

## 2023-10-13 LAB
ALBUMIN SERPL BCP-MCNC: 3.6 G/DL (ref 3.5–5)
ALP SERPL-CCNC: 42 U/L (ref 34–104)
ALT SERPL W P-5'-P-CCNC: 10 U/L (ref 7–52)
ANION GAP SERPL CALCULATED.3IONS-SCNC: 6 MMOL/L
AST SERPL W P-5'-P-CCNC: 14 U/L (ref 13–39)
ATRIAL RATE: 117 BPM
BASOPHILS # BLD AUTO: 0.04 THOUSANDS/ÂΜL (ref 0–0.1)
BASOPHILS NFR BLD AUTO: 1 % (ref 0–1)
BILIRUB SERPL-MCNC: 0.79 MG/DL (ref 0.2–1)
BUN SERPL-MCNC: 17 MG/DL (ref 5–25)
CALCIUM SERPL-MCNC: 8.8 MG/DL (ref 8.4–10.2)
CHLORIDE SERPL-SCNC: 106 MMOL/L (ref 96–108)
CO2 SERPL-SCNC: 22 MMOL/L (ref 21–32)
CREAT SERPL-MCNC: 0.98 MG/DL (ref 0.6–1.3)
EOSINOPHIL # BLD AUTO: 0.37 THOUSAND/ÂΜL (ref 0–0.61)
EOSINOPHIL NFR BLD AUTO: 5 % (ref 0–6)
ERYTHROCYTE [DISTWIDTH] IN BLOOD BY AUTOMATED COUNT: 12.6 % (ref 11.6–15.1)
GFR SERPL CREATININE-BSD FRML MDRD: 72 ML/MIN/1.73SQ M
GLUCOSE SERPL-MCNC: 110 MG/DL (ref 65–140)
HCT VFR BLD AUTO: 40.9 % (ref 36.5–49.3)
HGB BLD-MCNC: 13.4 G/DL (ref 12–17)
IMM GRANULOCYTES # BLD AUTO: 0.02 THOUSAND/UL (ref 0–0.2)
IMM GRANULOCYTES NFR BLD AUTO: 0 % (ref 0–2)
LYMPHOCYTES # BLD AUTO: 2.74 THOUSANDS/ÂΜL (ref 0.6–4.47)
LYMPHOCYTES NFR BLD AUTO: 39 % (ref 14–44)
MAGNESIUM SERPL-MCNC: 1.8 MG/DL (ref 1.9–2.7)
MCH RBC QN AUTO: 32.1 PG (ref 26.8–34.3)
MCHC RBC AUTO-ENTMCNC: 32.8 G/DL (ref 31.4–37.4)
MCV RBC AUTO: 98 FL (ref 82–98)
MONOCYTES # BLD AUTO: 0.68 THOUSAND/ÂΜL (ref 0.17–1.22)
MONOCYTES NFR BLD AUTO: 10 % (ref 4–12)
NEUTROPHILS # BLD AUTO: 3.26 THOUSANDS/ÂΜL (ref 1.85–7.62)
NEUTS SEG NFR BLD AUTO: 45 % (ref 43–75)
NRBC BLD AUTO-RTO: 0 /100 WBCS
PHOSPHATE SERPL-MCNC: 3.7 MG/DL (ref 2.3–4.1)
PLATELET # BLD AUTO: 204 THOUSANDS/UL (ref 149–390)
PMV BLD AUTO: 10.5 FL (ref 8.9–12.7)
POTASSIUM SERPL-SCNC: 4.2 MMOL/L (ref 3.5–5.3)
PROT SERPL-MCNC: 6.2 G/DL (ref 6.4–8.4)
QRS AXIS: 86 DEGREES
QRSD INTERVAL: 146 MS
QT INTERVAL: 310 MS
QTC INTERVAL: 413 MS
RBC # BLD AUTO: 4.17 MILLION/UL (ref 3.88–5.62)
SODIUM SERPL-SCNC: 134 MMOL/L (ref 135–147)
T WAVE AXIS: -40 DEGREES
VENTRICULAR RATE: 107 BPM
WBC # BLD AUTO: 7.11 THOUSAND/UL (ref 4.31–10.16)

## 2023-10-13 PROCEDURE — 84100 ASSAY OF PHOSPHORUS: CPT | Performed by: INTERNAL MEDICINE

## 2023-10-13 PROCEDURE — 83735 ASSAY OF MAGNESIUM: CPT | Performed by: INTERNAL MEDICINE

## 2023-10-13 PROCEDURE — 99232 SBSQ HOSP IP/OBS MODERATE 35: CPT | Performed by: INTERNAL MEDICINE

## 2023-10-13 PROCEDURE — 80053 COMPREHEN METABOLIC PANEL: CPT | Performed by: INTERNAL MEDICINE

## 2023-10-13 PROCEDURE — 93312 ECHO TRANSESOPHAGEAL: CPT

## 2023-10-13 PROCEDURE — 5A2204Z RESTORATION OF CARDIAC RHYTHM, SINGLE: ICD-10-PCS | Performed by: INTERNAL MEDICINE

## 2023-10-13 PROCEDURE — 85025 COMPLETE CBC W/AUTO DIFF WBC: CPT | Performed by: INTERNAL MEDICINE

## 2023-10-13 PROCEDURE — 92960 CARDIOVERSION ELECTRIC EXT: CPT

## 2023-10-13 PROCEDURE — B245ZZ4 ULTRASONOGRAPHY OF LEFT HEART, TRANSESOPHAGEAL: ICD-10-PCS | Performed by: INTERNAL MEDICINE

## 2023-10-13 PROCEDURE — 92960 CARDIOVERSION ELECTRIC EXT: CPT | Performed by: INTERNAL MEDICINE

## 2023-10-13 PROCEDURE — 93010 ELECTROCARDIOGRAM REPORT: CPT | Performed by: INTERNAL MEDICINE

## 2023-10-13 PROCEDURE — 93005 ELECTROCARDIOGRAM TRACING: CPT

## 2023-10-13 RX ORDER — EPHEDRINE SULFATE 50 MG/ML
INJECTION INTRAVENOUS AS NEEDED
Status: DISCONTINUED | OUTPATIENT
Start: 2023-10-13 | End: 2023-10-13

## 2023-10-13 RX ORDER — SODIUM CHLORIDE 9 MG/ML
75 INJECTION, SOLUTION INTRAVENOUS CONTINUOUS
Status: DISPENSED | OUTPATIENT
Start: 2023-10-13 | End: 2023-10-13

## 2023-10-13 RX ORDER — MAGNESIUM SULFATE HEPTAHYDRATE 40 MG/ML
2 INJECTION, SOLUTION INTRAVENOUS ONCE
Status: COMPLETED | OUTPATIENT
Start: 2023-10-13 | End: 2023-10-13

## 2023-10-13 RX ORDER — SODIUM CHLORIDE 9 MG/ML
INJECTION, SOLUTION INTRAVENOUS CONTINUOUS PRN
Status: DISCONTINUED | OUTPATIENT
Start: 2023-10-13 | End: 2023-10-13

## 2023-10-13 RX ORDER — LIDOCAINE HYDROCHLORIDE 10 MG/ML
INJECTION, SOLUTION EPIDURAL; INFILTRATION; INTRACAUDAL; PERINEURAL AS NEEDED
Status: DISCONTINUED | OUTPATIENT
Start: 2023-10-13 | End: 2023-10-13

## 2023-10-13 RX ORDER — PROPOFOL 10 MG/ML
INJECTION, EMULSION INTRAVENOUS AS NEEDED
Status: DISCONTINUED | OUTPATIENT
Start: 2023-10-13 | End: 2023-10-13

## 2023-10-13 RX ORDER — PHENYLEPHRINE HCL IN 0.9% NACL 1 MG/10 ML
SYRINGE (ML) INTRAVENOUS AS NEEDED
Status: DISCONTINUED | OUTPATIENT
Start: 2023-10-13 | End: 2023-10-13

## 2023-10-13 RX ADMIN — APIXABAN 5 MG: 5 TABLET, FILM COATED ORAL at 17:12

## 2023-10-13 RX ADMIN — EPHEDRINE SULFATE 5 MG: 50 INJECTION INTRAVENOUS at 15:16

## 2023-10-13 RX ADMIN — PROPOFOL 100 MG: 10 INJECTION, EMULSION INTRAVENOUS at 15:11

## 2023-10-13 RX ADMIN — ASPIRIN 81 MG: 81 TABLET, COATED ORAL at 08:28

## 2023-10-13 RX ADMIN — LIDOCAINE HYDROCHLORIDE 100 MG: 10 INJECTION, SOLUTION EPIDURAL; INFILTRATION; INTRACAUDAL; PERINEURAL at 15:08

## 2023-10-13 RX ADMIN — SODIUM CHLORIDE: 0.9 INJECTION, SOLUTION INTRAVENOUS at 15:08

## 2023-10-13 RX ADMIN — Medication 100 MCG: at 15:16

## 2023-10-13 RX ADMIN — APIXABAN 5 MG: 5 TABLET, FILM COATED ORAL at 09:11

## 2023-10-13 RX ADMIN — PROPOFOL 50 MG: 10 INJECTION, EMULSION INTRAVENOUS at 15:14

## 2023-10-13 RX ADMIN — METOPROLOL TARTRATE 75 MG: 50 TABLET, FILM COATED ORAL at 08:28

## 2023-10-13 RX ADMIN — SODIUM CHLORIDE 75 ML/HR: 0.9 INJECTION, SOLUTION INTRAVENOUS at 12:07

## 2023-10-13 RX ADMIN — Medication 100 MCG: at 15:11

## 2023-10-13 RX ADMIN — LISINOPRIL 5 MG: 5 TABLET ORAL at 08:29

## 2023-10-13 RX ADMIN — MAGNESIUM SULFATE HEPTAHYDRATE 2 G: 2 INJECTION, SOLUTION INTRAVENOUS at 12:08

## 2023-10-13 RX ADMIN — PANTOPRAZOLE SODIUM 40 MG: 40 TABLET, DELAYED RELEASE ORAL at 04:36

## 2023-10-13 NOTE — PROGRESS NOTES
[facility]  Progress Note  Name: Queenie Gallegos  MRN: [id number]  Unit/Bed#: -01 I Date of Admission: 10/12/2023   Date of Service: 10/13/2023 I Hospital Day: 1    Assessment/Plan   * Atrial fibrillation with rapid ventricular response [facility])  Assessment & Plan  Patient presented with A-fib with RVR  Was given IV Lopressor x2 doses  Heart rates remain still elevated from 90-1 30s  Patient has been having generalized weakness, fatigue and having shortness of breath. Cardiology consult appreciated  Patient is on Eliquis at home but missed doses recently  On Lopressor 75 mg twice daily  TSH 6 with free T4 is 1  Repeat thyroid function test in 4 to 6 weeks as outpatient  On telemetry  As needed Lopressor  Continue anticoagulation with Eliquis  Patient is scheduled for NANCY guided cardioversion by cardiology today    Hypertension  Assessment & Plan  Continue on Zestril and Lopressor  Monitor vital signs per protocol    GERD (gastroesophageal reflux disease)  Assessment & Plan  On Protonix    Chronic combined systolic and diastolic congestive heart failure (HCC)  Assessment & Plan  Wt Readings from Last 3 Encounters:   10/13/23 88 kg (194 lb 0.1 oz)   08/23/23 83.5 kg (184 lb 1.4 oz)   05/23/23 83.5 kg (184 lb)     Echocardiogram in February 2021 showed ejection fraction of 40% with grade 2 diastolic dysfunction  Not on any diuretics at home  Daily weight and I&O's  Cardiology on board           Labs & Imaging: I have personally reviewed pertinent reports. VTE Prophylaxis: in place. Code Status:   Level 1 - Full Code    Patient Centered Rounds: I have performed bedside rounds with nursing staff today.     Discussions with Specialists or Other Care Team Provider: Cardiology    Education and Discussions with Family / Patient: Wife at bedside    Current Length of Stay: 1 day(s)    Current Patient Status: Inpatient   Certification Statement: The patient will continue to require additional inpatient hospital stay due to see my assessment and plan. Subjective:   Patient is seen and examined at bedside. Patient with uncontrolled heart rates. Denies any complaints. Afebrile  All other ROS are negative. Objective:    Vitals: Blood pressure 139/79, pulse 93, temperature 97.6 °F (36.4 °C), resp. rate 19, height 5' 8" (1.727 m), weight 88 kg (194 lb 0.1 oz), SpO2 94 %. ,Body mass index is 29.5 kg/m². SPO2 RA Rest      Flowsheet Row ED to Hosp-Admission (Current) from 10/12/2023 in [facility]   SpO2 94 %   SpO2 Activity At Rest   O2 Device None (Room air)   O2 Flow Rate --          I&O:   Intake/Output Summary (Last 24 hours) at 10/13/2023 1159  Last data filed at 10/13/2023 0412  Gross per 24 hour   Intake --   Output 1080 ml   Net -1080 ml       Physical Exam:    General- Alert, lying comfortably in bed. Not in any acute distress. Neck- Supple, No JVD  CVS- irregular, S1 and S2 normal  Chest- Bilateral Air entry, No rhochi, crackles or wheezing present. Abdomen- soft, nontender, not distended, no guarding or rigidity, BS+  Extremities-  No pedal edema, No calf tenderness                         Normal ROM in all extremities. CNS-   Alert, awake and orientedx3. No focal deficits present. Invasive Devices       Peripheral Intravenous Line  Duration             Peripheral IV 10/12/23 Left Antecubital 1 day                          Social History  reviewed  History reviewed. No pertinent family history.  reviewed    Meds:  Current Facility-Administered Medications   Medication Dose Route Frequency Provider Last Rate Last Admin    acetaminophen (TYLENOL) tablet 650 mg  650 mg Oral Q6H PRN Tin Sloan MD        apixaban (ELIQUIS) tablet 5 mg  5 mg Oral BID Tin Sloan MD   5 mg at 10/13/23 0911    aspirin (ECOTRIN LOW STRENGTH) EC tablet 81 mg  81 mg Oral Daily Tin Sloan MD   81 mg at 10/13/23 0828    lisinopril (ZESTRIL) tablet 5 mg 5 mg Oral Daily Eddie Roa MD   5 mg at 10/13/23 2973    magnesium sulfate 2 g/50 mL IVPB (premix) 2 g  2 g Intravenous Once Eddie Roa MD        metoprolol tartrate (LOPRESSOR) tablet 75 mg  75 mg Oral Q12H Otto Amos MD   75 mg at 10/13/23 0828    pantoprazole (PROTONIX) EC tablet 40 mg  40 mg Oral Early Morning Eddie Roa MD   40 mg at 10/13/23 0436    sodium chloride 0.9 % infusion  75 mL/hr Intravenous Continuous Eddie Roa MD          Medications Prior to Admission   Medication    apixaban (ELIQUIS) 5 mg    aspirin (ECOTRIN LOW STRENGTH) 81 mg EC tablet    lisinopril (ZESTRIL) 5 mg tablet    metoprolol tartrate 75 MG TABS    omeprazole (PriLOSEC) 20 mg delayed release capsule       Labs:  Results from last 7 days   Lab Units 10/13/23  0412 10/12/23  0714   WBC Thousand/uL 7.11 8.31   HEMOGLOBIN g/dL 13.4 14.1   HEMATOCRIT % 40.9 44.7   PLATELETS Thousands/uL 204 191   NEUTROS PCT % 45 45   LYMPHS PCT % 39 41   MONOS PCT % 10 9   EOS PCT % 5 4     Results from last 7 days   Lab Units 10/13/23  0412 10/12/23  0714   POTASSIUM mmol/L 4.2 4.2   CHLORIDE mmol/L 106 106   CO2 mmol/L 22 24   BUN mg/dL 17 19   CREATININE mg/dL 0.98 1.10   CALCIUM mg/dL 8.8 9.5   ALK PHOS U/L 42 48   ALT U/L 10 10   AST U/L 14 17     Lab Results   Component Value Date    TROPONINI <0.02 02/05/2021         Lab Results   Component Value Date    BLOODCX No Growth After 5 Days. 02/06/2021    BLOODCX No Growth After 5 Days. 02/06/2021    BLOODCX No Growth After 5 Days. 02/05/2021    BLOODCX Escherichia coli (A) 02/05/2021    BLOODCX Escherichia coli (A) 02/05/2021         Imaging:  Results for orders placed during the hospital encounter of 10/12/23    XR chest 1 view portable    Narrative  CHEST    INDICATION:   Shortness of breath. COMPARISON: 11/3/2021    EXAM PERFORMED/VIEWS:  XR CHEST PORTABLE      FINDINGS:    Cardiomediastinal silhouette appears unremarkable.     There is mild bibasilar scarring similar to the prior study without evidence of acute airspace disease. No pneumothorax. No significant effusion. Osseous structures appear within normal limits for patient age. Impression  No change from earlier study. No acute pulmonary disease            Workstation performed: [id number]    Results for orders placed during the hospital encounter of 06/15/19    XR chest 2 views    Narrative  CHEST    INDICATION: Chest pain, food bolus impaction. COMPARISON:  None    EXAM PERFORMED/VIEWS:  XR CHEST PA & LATERAL      FINDINGS:    Cardiomediastinal silhouette appears unremarkable. Bibasilar subsegmental atelectasis. No pneumothorax. No pleural effusion. Osseous structures appear within normal limits for patient age. Impression  No acute cardiopulmonary disease. Workstation performed: [id number]      Last 24 Hours Medication List:   Current Facility-Administered Medications   Medication Dose Route Frequency Provider Last Rate    acetaminophen  650 mg Oral Q6H PRN Donnie Walker MD      apixaban  5 mg Oral BID Donnie Walker MD      aspirin  81 mg Oral Daily Donnie Walker MD      lisinopril  5 mg Oral Daily Donnie Walker MD      magnesium sulfate  2 g Intravenous Once Donnie Walker MD      metoprolol tartrate  75 mg Oral Q12H Cal Judge MD      pantoprazole  40 mg Oral Early Morning Donnie Walker MD      sodium chloride  75 mL/hr Intravenous Continuous Donnie Walker MD          Today, Patient Was Seen By: Donnie Walker MD    ** Please Note: Dictation voice to text software may have been used in the creation of this document.  **

## 2023-10-13 NOTE — PROGRESS NOTES
SBP remains 80-90's with MAPs in 60's. Pt remains asymptomatic. Was slightly lightheaded early but has resolved. Pt states I feel great.

## 2023-10-13 NOTE — CASE MANAGEMENT
Case Management Assessment & Discharge Planning Note    Patient name Marivel Newell  Location [address] [facility] Erin [id number] MRN [id number]  : 1945 Date 10/13/2023       Current Admission Date: 10/12/2023  Current Admission Diagnosis:Atrial fibrillation with rapid ventricular response [facility])   Patient Active Problem List    Diagnosis Date Noted    Atrial fibrillation with rapid ventricular response ([address]) 10/12/2023    GERD (gastroesophageal reflux disease) 10/12/2023    Chronic combined systolic and diastolic congestive heart failure ([address]) 2021    Gallbladder disease     Dysphagia 2021    Gram-negative bacteremia 2021    Acute cholecystitis 2021    PAF (paroxysmal atrial fibrillation) ([address]) 2021    Hypertension 2021      LOS (days): 1  Geometric Mean LOS (GMLOS) (days): 2.30  Days to GMLOS:1.2     OBJECTIVE:    Risk of Unplanned Readmission Score: 9.2      Current admission status: Inpatient    Preferred Pharmacy:   [place] [id number]  Phone: [phone] Fax: [phone]    Primary Care Provider: Biju Obregon MD    Primary Insurance: MEDICARE  Secondary Insurance: [facility]    ASSESSMENT:  Active Health Care Proxies    There are no active Health Care Proxies on file.        Advance Directives  Does patient have a Health Care POA?: Yes  Does patient have Advance Directives?: Yes  Advance Directives: Living will, Power of  for health care  Primary Contact: Micaela Portillo - ty plans to change it to SO soon    Readmission Root Cause  30 Day Readmission: No    Patient Information  Admitted from[de-identified] Home  Mental Status: Alert  During Assessment patient was accompanied by: Spouse  Assessment information provided by[de-identified] Patient, Spouse  Primary Caregiver: Self  Support Systems: Self, Friend, Spouse/significant other  [place] of Residence: [address] [place] [address] do you live in?: [employer] entry access options.  Select all that apply.: Stairs  Number of steps to enter home.: 3  Type of Current Residence: 3 story home  Upon entering residence, is there a bedroom on the main floor (no further steps)?: Yes  Upon entering residence, is there a bathroom on the main floor (no further steps)?: Yes  In the last 12 months, was there a time when you were not able to pay the mortgage or rent on time?: No  In the last 12 months, how many places have you lived?: 1  In the last 12 months, was there a time when you did not have a steady place to sleep or slept in a shelter (including now)?: No  Homeless/housing insecurity resource given?: N/A  Living Arrangements: Lives w/ Spouse/significant other  Is patient a ?: Yes  Is patient active with VA ([address] [place] [address])?: Yes    Activities of Daily Living Prior to Admission  Functional Status: Independent  Completes ADLs independently?: Yes  Ambulates independently?: Yes  Does patient use assisted devices?: No  Does patient currently own DME?: No  Does patient have a history of Outpatient Therapy (PT/OT)?: Yes  Does the patient have a history of Short-Term Rehab?: No  Does patient have a history of HHC?: No  Does patient currently have [facility]?: No    Patient Information Continued  Does patient have prescription coverage?: Yes  Within the past 12 months, you worried that your food would run out before you got the money to buy more.: Never true  Within the past 12 months, the food you bought just didn't last and you didn't have money to get more.: Never true  Food insecurity resource given?: N/A  Does patient receive dialysis treatments?: No  Does patient have a history of substance abuse?: Yes  Is patient currently in treatment for substance abuse?: N/A - sober  Does patient have a history of Mental Health Diagnosis?: No    Means of Transportation  Means of Transport to Appts[de-identified] Family transport  In the past 12 months, has lack of transportation kept you from medical appointments or from getting medications?: No  In the past 12 months, has lack of transportation kept you from meetings, work, or from getting things needed for daily living?: No  Was application for public transport provided?: N/A    DISCHARGE DETAILS:    Discharge planning discussed with[de-identified] pt     CM contacted family/caregiver?: Yes  Were Treatment Team discharge recommendations reviewed with patient/caregiver?: Yes  Did patient/caregiver verbalize understanding of patient care needs?: Yes  Were patient/caregiver advised of the risks associated with not following Treatment Team discharge recommendations?: Yes    Contacts  Patient Contacts: Carlos Noe: significant other  Relationship to Patient[de-identified] Family  Contact Method: In Person  Reason/Outcome: Emergency [address]         Is the patient interested in [facility] at discharge?: No    DME Referral Provided  Referral made for DME?: No    Other Referral/Resources/Interventions Provided:  Interventions: None Indicated    Treatment Team Recommendation: Home  Discharge Destination Plan[de-identified] Home  Transport at Discharge : Family     Additional Comments: Cm met with pt and reviewed cm role. Pt lives with his SO in a 3S with 2STE. He stays on [facility] 1st floor but is ind and has no trouble with stairs. He uses no DME. No hx of VNA, STR, MH tx hx. 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 He uses [facility] for mail order. Pt's SO will trnasport home. Pt is ind and presents with no anticiapted d/c needs.

## 2023-10-13 NOTE — PROGRESS NOTES
Progress Note - Cardiology   Kina Renee 78 y.o. male MRN: [id number]  Unit/Bed#: -01 Encounter: [id number]        Problem List:  Principal Problem:    Atrial fibrillation with rapid ventricular response ([address])  Active Problems:    Hypertension    Chronic combined systolic and diastolic congestive heart failure (HCC)    GERD (gastroesophageal reflux disease)      Assessment:  Atrial fibrillation, paroxysmal  Prehospital AC: Eliquis 5 BID  Prehospital AV blocking rx: Metoprolol 50 BID  CAD with prior stenting in 2018   Hypertension  Prehospital Rx: Lisinopril 5 daily   Unspecified cardiomyopathy  02/2021 echo: LVEF 40%    Plan/ Discussion:  He remains in atrial fibrillation with rates 110- 130's. Continue lopressor 75 mg tablet. Continue Eliquis for anticoagulation. NANCY guided cardioversion scheduled for today  If he remains stable anticipate discharge tomorrow. Need not wait for cardiology in the AM if he is otherwise in sinus rhythm and feeling well. Follow up with the [address] [place] [address] on discharge     Subjective:  Patient is comfortable at rest. Mauricio sob, chest pain, and palpitations.      Vitals:  Vitals:    10/12/23 1811 10/13/23 0412   Weight: 86.4 kg (190 lb 7.6 oz) 88 kg (194 lb 0.1 oz)   ,  Vitals:    10/12/23 2111 10/13/23 0152 10/13/23 0412 10/13/23 0729   BP:    139/79   BP Location:       Pulse: (!) 120 93     Resp:    19   Temp:    97.6 °F (36.4 °C)   TempSrc:       SpO2:       Weight:   88 kg (194 lb 0.1 oz)    Height:           Exam:  General: Alert awake and oriented, no acute distress  Heart: Irregular, tachycardic , no murmurs, Normal S1, no edema    Respiratory effort/ Lungs:  Breathing comfortably on room air, clear bilaterally without wheezing, rales, crackles   Abdominal: Non-tender to palpation, + bowel sounds, soft, no masses or distension  Lower Limbs:  No edema            Telemetry:       Atrial fibrillation, Heart Rate 110-130's    Medications:    Current Facility-Administered Medications: acetaminophen (TYLENOL) tablet 650 mg, 650 mg, Oral, Q6H PRN, Katelyn Garcia MD    apixaban (ELIQUIS) tablet 5 mg, 5 mg, Oral, BID, Katelyn Garcia MD, 5 mg at 10/13/23 0911    aspirin (ECOTRIN LOW STRENGTH) EC tablet 81 mg, 81 mg, Oral, Daily, Katelyn Garcia MD, 81 mg at 10/13/23 0828    lisinopril (ZESTRIL) tablet 5 mg, 5 mg, Oral, Daily, Katelyn Garcia MD, 5 mg at 10/13/23 0829    metoprolol tartrate (LOPRESSOR) tablet 75 mg, 75 mg, Oral, Q12H [address], Katelyn Garcia MD, 75 mg at 10/13/23 0828    pantoprazole (PROTONIX) EC tablet 40 mg, 40 mg, Oral, Early Morning, Katelyn Garcia MD, 40 mg at 10/13/23 0436      Labs/Data:        Results from last 7 days   Lab Units 10/13/23  0412 10/12/23  0714   WBC Thousand/uL 7.11 8.31   HEMOGLOBIN g/dL 13.4 14.1   HEMATOCRIT % 40.9 44.7   PLATELETS Thousands/uL 204 191     Results from last 7 days   Lab Units 10/13/23  0412 10/12/23  0714   POTASSIUM mmol/L 4.2 4.2   CHLORIDE mmol/L 106 106   CO2 mmol/L 22 24   BUN mg/dL 17 19   CREATININE mg/dL 0.98 1.10

## 2023-10-13 NOTE — PLAN OF CARE
Problem: Potential for Falls  Goal: Patient will remain free of falls  Description: INTERVENTIONS:  - Educate patient/family on patient safety including physical limitations  - Instruct patient to call for assistance with activity   - Consult OT/PT to assist with strengthening/mobility   - Keep Call bell within reach  - Keep bed low and locked with side rails adjusted as appropriate  - Keep care items and personal belongings within reach  - Initiate and maintain comfort rounds  - Make Fall Risk Sign visible to staff  - Apply yellow socks and bracelet for high fall risk patients  - Consider moving patient to room near nurses station  Outcome: Progressing     Problem: INFECTION - ADULT  Goal: Absence or prevention of progression during hospitalization  Description: INTERVENTIONS:  - Assess and monitor for signs and symptoms of infection  - Monitor lab/diagnostic results  - Monitor all insertion sites, i.e. indwelling lines, tubes, and drains  - Monitor endotracheal if appropriate and nasal secretions for changes in amount and color  - [place] appropriate cooling/warming therapies per order  - Administer medications as ordered  - Instruct and encourage patient and family to use good hand hygiene technique  - Identify and instruct in appropriate isolation precautions for identified infection/condition  Outcome: Progressing  Goal: Absence of fever/infection during neutropenic period  Description: INTERVENTIONS:  - Monitor WBC    Outcome: Progressing

## 2023-10-13 NOTE — ANESTHESIA POSTPROCEDURE EVALUATION
Post-Op Assessment Note    CV Status:  Stable  Pain Score: 0    Pain management: adequate     Mental Status:  Alert and awake   Hydration Status:  Euvolemic   PONV Controlled:  Controlled   Airway Patency:  Patent      Post Op Vitals Reviewed: Yes      Staff: CRNA   Comments: Pt awake, alert, oriented, able to maintain own airway, pt receiving IVF bolus for hypotension but otherwise offering no other complaints, report to recovery RN        No notable events documented.     BP (!) 82/61 (10/13/23 1546)    Temp     Pulse 80 (10/13/23 1546)   Resp (!) 23 (10/13/23 1546)    SpO2 94 % (10/13/23 1546)

## 2023-10-13 NOTE — ANESTHESIA PREPROCEDURE EVALUATION
Procedure:  NANCY    Relevant Problems   CARDIO   (+) Atrial fibrillation with rapid ventricular response (HCC)   (+) Chronic combined systolic and diastolic congestive heart failure (HCC)   (+) Hypertension   (+) PAF (paroxysmal atrial fibrillation) (HCC)      GI/HEPATIC   (+) Dysphagia   (+) GERD (gastroesophageal reflux disease)      Vitals:    10/13/23 1443   BP:    Pulse:    Resp:    Temp:    SpO2: 95%       Physical Exam    Airway    Mallampati score: II  TM Distance: >3 FB  Neck ROM: full     Dental       Cardiovascular  Rhythm: irregular, Rate: normal    Pulmonary  Pulmonary exam normal     Other Findings        Anesthesia Plan  ASA Score- 3     Anesthesia Type- IV sedation with anesthesia with ASA Monitors. Additional Monitors:     Airway Plan:     Comment: I discussed risks (reviewed with patient on the anesthesia consent form), benefits and alternatives of monitored sedation including the possibility under sedation to have recall or mild discomfort. .       Plan Factors-    Chart reviewed. EKG reviewed. Patient summary reviewed. Induction- intravenous. Postoperative Plan-     Informed Consent- Anesthetic plan and risks discussed with patient. I personally reviewed this patient with the CRNA. Discussed and agreed on the Anesthesia Plan with the CRNA. Tho Larson

## 2023-10-13 NOTE — ASSESSMENT & PLAN NOTE
Wt Readings from Last 3 Encounters:   10/13/23 88 kg (194 lb 0.1 oz)   08/23/23 83.5 kg (184 lb 1.4 oz)   05/23/23 83.5 kg (184 lb)     Echocardiogram in February 2021 showed ejection fraction of 40% with grade 2 diastolic dysfunction  Not on any diuretics at home  Daily weight and I&O's  Cardiology on board

## 2023-10-13 NOTE — ASSESSMENT & PLAN NOTE
Patient presented with A-fib with RVR  Was given IV Lopressor x2 doses  Heart rates remain still elevated from 90-1 30s  Patient has been having generalized weakness, fatigue and having shortness of breath.   Cardiology consult appreciated  Patient is on Eliquis at home but missed doses recently  On Lopressor 75 mg twice daily  TSH 6 with free T4 is 1  Repeat thyroid function test in 4 to 6 weeks as outpatient  On telemetry  As needed Lopressor  Continue anticoagulation with Eliquis  Patient is scheduled for NANCY guided cardioversion by cardiology today

## 2023-10-13 NOTE — PHYSICAL THERAPY NOTE
PHYSICAL THERAPY SCREEN NOTE    Patient Name: Rebecca Raines  UCKRH'A Date: 10/13/2023       10/13/23 0282   Note Type   Note type Screen   Additional Comments PT orders received, chart review performed. Spoke to [employer], pt is independent in room. Pt scheduled for NANCY & Cardioversion this PM which will hopefully alleviate sx, pt w/ no acute PT needs, will DC PT.        Wanda Stanley, PT, DPT

## 2023-10-13 NOTE — OCCUPATIONAL THERAPY NOTE
Occupational Therapy Screen Note     Patient Name: Feliberto Hanley  UXIEX'B Date: 10/13/2023  Problem List  Principal Problem:    Atrial fibrillation with rapid ventricular response [facility])  Active Problems:    Hypertension    Chronic combined systolic and diastolic congestive heart failure ([address] [facility])    GERD (gastroesophageal reflux disease)            10/13/23 1651   OT Last Visit   OT Visit Date 10/13/23   Note Type   Note type Screen   Additional Comments OT orders received, chart review performed. Per RN Noni Oneal, pt is independent in room. No acute OT needs identified. Will D/C OT orders.              Jesús Mills, OTR/L

## 2023-10-14 VITALS
OXYGEN SATURATION: 96 % | TEMPERATURE: 96.6 F | BODY MASS INDEX: 28.31 KG/M2 | HEART RATE: 79 BPM | HEIGHT: 68 IN | RESPIRATION RATE: 18 BRPM | SYSTOLIC BLOOD PRESSURE: 127 MMHG | WEIGHT: 186.8 LBS | DIASTOLIC BLOOD PRESSURE: 67 MMHG

## 2023-10-14 LAB
ANION GAP SERPL CALCULATED.3IONS-SCNC: 5 MMOL/L
ATRIAL RATE: 81 BPM
BASOPHILS # BLD AUTO: 0.03 THOUSANDS/ÂΜL (ref 0–0.1)
BASOPHILS NFR BLD AUTO: 0 % (ref 0–1)
BUN SERPL-MCNC: 17 MG/DL (ref 5–25)
CALCIUM SERPL-MCNC: 8.4 MG/DL (ref 8.4–10.2)
CHLORIDE SERPL-SCNC: 105 MMOL/L (ref 96–108)
CO2 SERPL-SCNC: 22 MMOL/L (ref 21–32)
CREAT SERPL-MCNC: 1.01 MG/DL (ref 0.6–1.3)
EOSINOPHIL # BLD AUTO: 0.41 THOUSAND/ÂΜL (ref 0–0.61)
EOSINOPHIL NFR BLD AUTO: 6 % (ref 0–6)
ERYTHROCYTE [DISTWIDTH] IN BLOOD BY AUTOMATED COUNT: 12.8 % (ref 11.6–15.1)
GFR SERPL CREATININE-BSD FRML MDRD: 69 ML/MIN/1.73SQ M
GLUCOSE SERPL-MCNC: 93 MG/DL (ref 65–140)
HCT VFR BLD AUTO: 37.9 % (ref 36.5–49.3)
HGB BLD-MCNC: 12.5 G/DL (ref 12–17)
IMM GRANULOCYTES # BLD AUTO: 0.02 THOUSAND/UL (ref 0–0.2)
IMM GRANULOCYTES NFR BLD AUTO: 0 % (ref 0–2)
LYMPHOCYTES # BLD AUTO: 2.62 THOUSANDS/ÂΜL (ref 0.6–4.47)
LYMPHOCYTES NFR BLD AUTO: 39 % (ref 14–44)
MAGNESIUM SERPL-MCNC: 2.1 MG/DL (ref 1.9–2.7)
MCH RBC QN AUTO: 32.6 PG (ref 26.8–34.3)
MCHC RBC AUTO-ENTMCNC: 33 G/DL (ref 31.4–37.4)
MCV RBC AUTO: 99 FL (ref 82–98)
MONOCYTES # BLD AUTO: 0.6 THOUSAND/ÂΜL (ref 0.17–1.22)
MONOCYTES NFR BLD AUTO: 9 % (ref 4–12)
NEUTROPHILS # BLD AUTO: 3.04 THOUSANDS/ÂΜL (ref 1.85–7.62)
NEUTS SEG NFR BLD AUTO: 46 % (ref 43–75)
NRBC BLD AUTO-RTO: 0 /100 WBCS
P AXIS: 73 DEGREES
PLATELET # BLD AUTO: 186 THOUSANDS/UL (ref 149–390)
PMV BLD AUTO: 10.4 FL (ref 8.9–12.7)
POTASSIUM SERPL-SCNC: 4.1 MMOL/L (ref 3.5–5.3)
PR INTERVAL: 204 MS
QRS AXIS: 70 DEGREES
QRSD INTERVAL: 160 MS
QT INTERVAL: 462 MS
QTC INTERVAL: 536 MS
RBC # BLD AUTO: 3.83 MILLION/UL (ref 3.88–5.62)
SODIUM SERPL-SCNC: 132 MMOL/L (ref 135–147)
T WAVE AXIS: 0 DEGREES
VENTRICULAR RATE: 81 BPM
WBC # BLD AUTO: 6.72 THOUSAND/UL (ref 4.31–10.16)

## 2023-10-14 PROCEDURE — 99239 HOSP IP/OBS DSCHRG MGMT >30: CPT | Performed by: INTERNAL MEDICINE

## 2023-10-14 PROCEDURE — 85025 COMPLETE CBC W/AUTO DIFF WBC: CPT | Performed by: INTERNAL MEDICINE

## 2023-10-14 PROCEDURE — 83735 ASSAY OF MAGNESIUM: CPT | Performed by: INTERNAL MEDICINE

## 2023-10-14 PROCEDURE — 80048 BASIC METABOLIC PNL TOTAL CA: CPT | Performed by: INTERNAL MEDICINE

## 2023-10-14 RX ADMIN — APIXABAN 5 MG: 5 TABLET, FILM COATED ORAL at 08:33

## 2023-10-14 RX ADMIN — ASPIRIN 81 MG: 81 TABLET, COATED ORAL at 08:33

## 2023-10-14 RX ADMIN — METOPROLOL TARTRATE 75 MG: 50 TABLET, FILM COATED ORAL at 08:33

## 2023-10-14 RX ADMIN — LISINOPRIL 5 MG: 5 TABLET ORAL at 08:32

## 2023-10-14 RX ADMIN — PANTOPRAZOLE SODIUM 40 MG: 40 TABLET, DELAYED RELEASE ORAL at 03:02

## 2023-10-14 NOTE — DISCHARGE INSTR - AVS FIRST PAGE
Follow-up with PCP in 1 week  Follow-up with cardiology as outpatient  Repeat basic metabolic panel with PCP in 1 week  Repeat thyroid function test with PCP in 4 to 6 weeks  Return to ER with any worsening chest pain, palpitation, shortness of breath or any other alarming symptoms

## 2023-10-14 NOTE — ASSESSMENT & PLAN NOTE
Patient presented with A-fib with RVR  Was given IV Lopressor x2 doses  Heart rates remain still elevated from 90-1 30s  Patient has been having generalized weakness, fatigue and having shortness of breath.   Cardiology consult appreciated  Patient is on Eliquis at home but missed doses recently  On Lopressor 75 mg twice daily  TSH 6 with free T4 is 1  Repeat thyroid function test in 4 to 6 weeks as outpatient  On telemetry  Continue anticoagulation with Eliquis  Patient underwent NANCY guided cardioversion and is in normal sinus rhythm  Continue on Lopressor  Patient is cleared by cardiology for discharge with outpatient follow-up with them

## 2023-10-14 NOTE — PLAN OF CARE
Problem: Potential for Falls  Goal: Patient will remain free of falls  Description: INTERVENTIONS:  - Educate patient/family on patient safety including physical limitations  - Instruct patient to call for assistance with activity   - Consult OT/PT to assist with strengthening/mobility   - Keep Call bell within reach  - Keep bed low and locked with side rails adjusted as appropriate  - Keep care items and personal belongings within reach  - Initiate and maintain comfort rounds  - Make Fall Risk Sign visible to staff  - Apply yellow socks and bracelet for high fall risk patients  - Consider moving patient to room near nurses station  Outcome: Progressing     Problem: CARDIOVASCULAR - ADULT  Goal: Maintains optimal cardiac output and hemodynamic stability  Description: INTERVENTIONS:  - Monitor I/O, vital signs and rhythm  - Monitor for S/S and trends of decreased cardiac output  - Administer and titrate ordered vasoactive medications to optimize hemodynamic stability  - Assess quality of pulses, skin color and temperature  - Assess for signs of decreased coronary artery perfusion  - Instruct patient to report change in severity of symptoms  Outcome: Progressing  Goal: Absence of cardiac dysrhythmias or at baseline rhythm  Description: INTERVENTIONS:  - Continuous cardiac monitoring, vital signs, obtain 12 lead EKG if ordered  - Administer antiarrhythmic and heart rate control medications as ordered  - Monitor electrolytes and administer replacement therapy as ordered  Outcome: Progressing     Problem: DISCHARGE PLANNING  Goal: Discharge to home or other facility with appropriate resources  Description: INTERVENTIONS:  - Identify barriers to discharge w/patient and caregiver  - Arrange for needed discharge resources and transportation as appropriate  - Identify discharge learning needs (meds, wound care, etc.)  - Arrange for interpretive services to assist at discharge as needed  - Refer to Case Management Department for coordinating discharge planning if the patient needs post-hospital services based on physician/advanced practitioner order or complex needs related to functional status, cognitive ability, or social support system  Outcome: Progressing     Problem: Knowledge Deficit  Goal: Patient/family/caregiver demonstrates understanding of disease process, treatment plan, medications, and discharge instructions  Description: Complete learning assessment and assess knowledge base.   Interventions:  - Provide teaching at level of understanding  - Provide teaching via preferred learning methods  Outcome: Progressing

## 2023-10-14 NOTE — ASSESSMENT & PLAN NOTE
Wt Readings from Last 3 Encounters:   10/14/23 84.7 kg (186 lb 12.8 oz)   08/23/23 83.5 kg (184 lb 1.4 oz)   05/23/23 83.5 kg (184 lb)     Echocardiogram in February 2021 showed ejection fraction of 40% with grade 2 diastolic dysfunction  Not on any diuretics at home  Daily weight and I&O's  Cardiology on board

## 2023-10-14 NOTE — PLAN OF CARE
Problem: Potential for Falls  Goal: Patient will remain free of falls  Description: INTERVENTIONS:  - Educate patient/family on patient safety including physical limitations  - Instruct patient to call for assistance with activity   - Consult OT/PT to assist with strengthening/mobility   - Keep Call bell within reach  - Keep bed low and locked with side rails adjusted as appropriate  - Keep care items and personal belongings within reach  - Initiate and maintain comfort rounds  - Make Fall Risk Sign visible to staff  - Apply yellow socks and bracelet for high fall risk patients  - Consider moving patient to room near nurses station  Outcome: Progressing     Problem: Prexisting or High Potential for Compromised Skin Integrity  Goal: Skin integrity is maintained or improved  Description: INTERVENTIONS:  - Identify patients at risk for skin breakdown  - Assess and monitor skin integrity  - Assess and monitor nutrition and hydration status  - Monitor labs   - Assess for incontinence   - Turn and reposition patient  - Assist with mobility/ambulation  - Relieve pressure over bony prominences  - Avoid friction and shearing  - Provide appropriate hygiene as needed including keeping skin clean and dry  - Evaluate need for skin moisturizer/barrier cream  - Collaborate with interdisciplinary team   - Patient/family teaching  - Consider wound care consult   Outcome: Progressing     Problem: PAIN - ADULT  Goal: Verbalizes/displays adequate comfort level or baseline comfort level  Description: Interventions:  - Encourage patient to monitor pain and request assistance  - Assess pain using appropriate pain scale  - Administer analgesics based on type and severity of pain and evaluate response  - Implement non-pharmacological measures as appropriate and evaluate response  - Consider cultural and social influences on pain and pain management  - Notify physician/advanced practitioner if interventions unsuccessful or patient reports new pain  Outcome: Progressing     Problem: INFECTION - ADULT  Goal: Absence or prevention of progression during hospitalization  Description: INTERVENTIONS:  - Assess and monitor for signs and symptoms of infection  - Monitor lab/diagnostic results  - Monitor all insertion sites, i.e. indwelling lines, tubes, and drains  - Monitor endotracheal if appropriate and nasal secretions for changes in amount and color  - Herald appropriate cooling/warming therapies per order  - Administer medications as ordered  - Instruct and encourage patient and family to use good hand hygiene technique  - Identify and instruct in appropriate isolation precautions for identified infection/condition  Outcome: Progressing  Goal: Absence of fever/infection during neutropenic period  Description: INTERVENTIONS:  - Monitor WBC    Outcome: Progressing     Problem: SAFETY ADULT  Goal: Patient will remain free of falls  Description: INTERVENTIONS:  - Educate patient/family on patient safety including physical limitations  - Instruct patient to call for assistance with activity   - Consult OT/PT to assist with strengthening/mobility   - Keep Call bell within reach  - Keep bed low and locked with side rails adjusted as appropriate  - Keep care items and personal belongings within reach  - Initiate and maintain comfort rounds  - Make Fall Risk Sign visible to staff  - Apply yellow socks and bracelet for high fall risk patients  - Consider moving patient to room near nurses station  Outcome: Progressing  Goal: Maintain or return to baseline ADL function  Description: INTERVENTIONS:  -  Assess patient's ability to carry out ADLs; assess patient's baseline for ADL function and identify physical deficits which impact ability to perform ADLs (bathing, care of mouth/teeth, toileting, grooming, dressing, etc.)  - Assess/evaluate cause of self-care deficits   - Assess range of motion  - Assess patient's mobility; develop plan if impaired  - Assess patient's need for assistive devices and provide as appropriate  - Encourage maximum independence but intervene and supervise when necessary  - Involve family in performance of ADLs  - Assess for home care needs following discharge   - Consider OT consult to assist with ADL evaluation and planning for discharge  - Provide patient education as appropriate  Outcome: Progressing  Goal: Maintains/Returns to pre admission functional level  Description: INTERVENTIONS:  - Perform BMAT or MOVE assessment daily.   - Set and communicate daily mobility goal to care team and patient/family/caregiver. - Collaborate with rehabilitation services on mobility goals if consulted  - Stand patient 4 times a day  - Ambulate patient 2 times a day  - Out of bed to chair 2 times a day   - Out of bed for meals 2 times a day  - Out of bed for toileting  - Record patient progress and toleration of activity level   Outcome: Progressing     Problem: DISCHARGE PLANNING  Goal: Discharge to home or other facility with appropriate resources  Description: INTERVENTIONS:  - Identify barriers to discharge w/patient and caregiver  - Arrange for needed discharge resources and transportation as appropriate  - Identify discharge learning needs (meds, wound care, etc.)  - Arrange for interpretive services to assist at discharge as needed  - Refer to Case Management Department for coordinating discharge planning if the patient needs post-hospital services based on physician/advanced practitioner order or complex needs related to functional status, cognitive ability, or social support system  Outcome: Progressing     Problem: Knowledge Deficit  Goal: Patient/family/caregiver demonstrates understanding of disease process, treatment plan, medications, and discharge instructions  Description: Complete learning assessment and assess knowledge base.   Interventions:  - Provide teaching at level of understanding  - Provide teaching via preferred learning methods  Outcome: Progressing     Problem: CARDIOVASCULAR - ADULT  Goal: Maintains optimal cardiac output and hemodynamic stability  Description: INTERVENTIONS:  - Monitor I/O, vital signs and rhythm  - Monitor for S/S and trends of decreased cardiac output  - Administer and titrate ordered vasoactive medications to optimize hemodynamic stability  - Assess quality of pulses, skin color and temperature  - Assess for signs of decreased coronary artery perfusion  - Instruct patient to report change in severity of symptoms  Outcome: Progressing  Goal: Absence of cardiac dysrhythmias or at baseline rhythm  Description: INTERVENTIONS:  - Continuous cardiac monitoring, vital signs, obtain 12 lead EKG if ordered  - Administer antiarrhythmic and heart rate control medications as ordered  - Monitor electrolytes and administer replacement therapy as ordered  Outcome: Progressing

## 2023-10-14 NOTE — DISCHARGE SUMMARY
[address] [facility]  Discharge- Britton Gilbert 1945, 78 y.o. male MRN: [id number]  Unit/Bed#: -[facility] Encounter: [id number]  Primary Care Provider: Yann Douglass MD   Date and time admitted to hospital: 10/12/2023  7:07 AM    * Atrial fibrillation with rapid ventricular response [facility])  Assessment & Plan  Patient presented with A-fib with RVR  Was given IV Lopressor x2 doses  Heart rates remain still elevated from 90-1 30s  Patient has been having generalized weakness, fatigue and having shortness of breath. Cardiology consult appreciated  Patient is on Eliquis at home but missed doses recently  On Lopressor 75 mg twice daily  TSH 6 with free T4 is 1  Repeat thyroid function test in 4 to 6 weeks as outpatient  On telemetry  Continue anticoagulation with Eliquis  Patient underwent NANCY guided cardioversion and is in normal sinus rhythm  Continue on Lopressor  Patient is cleared by cardiology for discharge with outpatient follow-up with them    Hypertension  Assessment & Plan  Continue on Zestril and Lopressor  Monitor vital signs per protocol    GERD (gastroesophageal reflux disease)  Assessment & Plan  On Protonix    Chronic combined systolic and diastolic congestive heart failure ([address])  Assessment & Plan  Wt Readings from Last 3 Encounters:   10/14/23 84.7 kg (186 lb 12.8 oz)   08/23/23 83.5 kg (184 lb 1.4 oz)   05/23/23 83.5 kg (184 lb)     Echocardiogram in February 2021 showed ejection fraction of 40% with grade 2 diastolic dysfunction  Not on any diuretics at home  Daily weight and I&O's  Cardiology on board      Hospital Course:     Britton Gilbert is a 78 y.o. male patient who originally presented to the hospital on   Admission Orders (From admission, onward)       Ordered        10/12/23 [address] [facility]  Once                         due to complaint of fatigue, generalized weakness and shortness of breath with exertion.   Patient states that he has been feeling weak and tired and fatigued for the last couple of days. Yesterday he checked his blood pressure and noticed that heart rate was elevated in 140s. Patient came to ER for evaluation and was noted to have A-fib with RVR. Patient was given IV Lopressor and oral Lopressor and cardiology was consulted   Patient was seen by cardiology and they did NANCY guided cardioversion on October 13 and patient is in normal sinus rhythm. As per cardiology patient can be discharged on home dose of Lopressor and continue Eliquis  Patient is hemodynamically stable for discharge and does not require home care services  Outpatient follow-up with cardiology  On Exam-  Chest-bilateral air entry, clear to auscultation  Abdomen-soft, nontender  Heart-S1 S2 regular  Extremities-no pedal edema or calf tenderness  Neuro-alert awake oriented x3. No focal deficits    Please see above list of diagnoses and related plan for additional information. Follow-up with PCP in 1 week  Follow-up with cardiology as outpatient  Repeat basic metabolic panel with PCP in 1 week  Repeat thyroid function test with PCP in 4 to 6 weeks  Return to ER with any worsening chest pain, palpitation, shortness of breath or any other alarming symptoms    Condition at Discharge:  good      Discharge instructions/Information to patient and family:   See after visit summary for information provided to patient and family. Provisions for Follow-Up Care:  See after visit summary for information related to follow-up care and any pertinent home health orders. Disposition:     Home       Discharge Statement:  I spent 40 minutes discharging the patient. This time was spent on the day of discharge. I had direct contact with the patient on the day of discharge. Greater than 50% of the total time was spent examining patient, answering all patient questions, arranging and discussing plan of care with patient as well as directly providing post-discharge instructions. Additional time then spent on discharge activities. Discharge Medications:  See after visit summary for reconciled discharge medications provided to patient and family.       ** Please Note: This note has been constructed using a voice recognition system **

## 2023-10-16 LAB — SL CV LV EF: 40

## 2023-10-16 PROCEDURE — 93320 DOPPLER ECHO COMPLETE: CPT | Performed by: INTERNAL MEDICINE

## 2023-10-16 PROCEDURE — 93312 ECHO TRANSESOPHAGEAL: CPT | Performed by: INTERNAL MEDICINE

## 2023-10-16 PROCEDURE — 93325 DOPPLER ECHO COLOR FLOW MAPG: CPT | Performed by: INTERNAL MEDICINE

## 2023-12-24 ENCOUNTER — APPOINTMENT (EMERGENCY)
Dept: LAB | Facility: HOSPITAL | Age: 78
End: 2023-12-24
Payer: COMMERCIAL

## 2023-12-24 ENCOUNTER — HOSPITAL ENCOUNTER (EMERGENCY)
Facility: HOSPITAL | Age: 78
Discharge: HOME | End: 2023-12-24
Attending: EMERGENCY MEDICINE
Payer: COMMERCIAL

## 2023-12-24 ENCOUNTER — APPOINTMENT (EMERGENCY)
Dept: CT IMAGING | Facility: HOSPITAL | Age: 78
End: 2023-12-24
Payer: COMMERCIAL

## 2023-12-24 VITALS
SYSTOLIC BLOOD PRESSURE: 125 MMHG | HEART RATE: 59 BPM | TEMPERATURE: 97.9 F | RESPIRATION RATE: 18 BRPM | OXYGEN SATURATION: 96 % | DIASTOLIC BLOOD PRESSURE: 60 MMHG

## 2023-12-24 DIAGNOSIS — K52.9 COLITIS: Primary | ICD-10-CM

## 2023-12-24 DIAGNOSIS — K52.9 COLITIS: ICD-10-CM

## 2023-12-24 LAB
ALBUMIN SERPL BCP-MCNC: 4 G/DL (ref 3.5–5)
ALP SERPL-CCNC: 62 U/L (ref 34–104)
ALT SERPL W P-5'-P-CCNC: 18 U/L (ref 7–52)
ANION GAP SERPL CALCULATED.3IONS-SCNC: 7 MMOL/L
AST SERPL W P-5'-P-CCNC: 22 U/L (ref 13–39)
BACTERIA UR QL AUTO: ABNORMAL /HPF
BASOPHILS # BLD AUTO: 0.04 THOUSANDS/ÂΜL (ref 0–0.1)
BASOPHILS NFR BLD AUTO: 1 % (ref 0–1)
BILIRUB SERPL-MCNC: 1 MG/DL (ref 0.2–1)
BILIRUB UR QL STRIP: NEGATIVE
BUN SERPL-MCNC: 11 MG/DL (ref 5–25)
CALCIUM SERPL-MCNC: 9.3 MG/DL (ref 8.4–10.2)
CHLORIDE SERPL-SCNC: 105 MMOL/L (ref 96–108)
CLARITY UR: CLEAR
CO2 SERPL-SCNC: 25 MMOL/L (ref 21–32)
COLOR UR: ABNORMAL
CREAT SERPL-MCNC: 1.14 MG/DL (ref 0.6–1.3)
EOSINOPHIL # BLD AUTO: 0.3 THOUSAND/ÂΜL (ref 0–0.61)
EOSINOPHIL NFR BLD AUTO: 5 % (ref 0–6)
ERYTHROCYTE [DISTWIDTH] IN BLOOD BY AUTOMATED COUNT: 13.2 % (ref 11.6–15.1)
FLUAV RNA RESP QL NAA+PROBE: NEGATIVE
FLUBV RNA RESP QL NAA+PROBE: NEGATIVE
GFR SERPL CREATININE-BSD FRML MDRD: 60 ML/MIN/1.73SQ M
GLUCOSE SERPL-MCNC: 99 MG/DL (ref 65–140)
GLUCOSE UR STRIP-MCNC: NEGATIVE MG/DL
HCT VFR BLD AUTO: 43.1 % (ref 36.5–49.3)
HGB BLD-MCNC: 13.7 G/DL (ref 12–17)
HGB UR QL STRIP.AUTO: ABNORMAL
IMM GRANULOCYTES # BLD AUTO: 0.01 THOUSAND/UL (ref 0–0.2)
IMM GRANULOCYTES NFR BLD AUTO: 0 % (ref 0–2)
KETONES UR STRIP-MCNC: NEGATIVE MG/DL
LEUKOCYTE ESTERASE UR QL STRIP: NEGATIVE
LIPASE SERPL-CCNC: 6 U/L (ref 11–82)
LYMPHOCYTES # BLD AUTO: 2.44 THOUSANDS/ÂΜL (ref 0.6–4.47)
LYMPHOCYTES NFR BLD AUTO: 37 % (ref 14–44)
MCH RBC QN AUTO: 30.6 PG (ref 26.8–34.3)
MCHC RBC AUTO-ENTMCNC: 31.8 G/DL (ref 31.4–37.4)
MCV RBC AUTO: 96 FL (ref 82–98)
MONOCYTES # BLD AUTO: 0.7 THOUSAND/ÂΜL (ref 0.17–1.22)
MONOCYTES NFR BLD AUTO: 11 % (ref 4–12)
NEUTROPHILS # BLD AUTO: 3.09 THOUSANDS/ÂΜL (ref 1.85–7.62)
NEUTS SEG NFR BLD AUTO: 46 % (ref 43–75)
NITRITE UR QL STRIP: NEGATIVE
NON-SQ EPI CELLS URNS QL MICRO: ABNORMAL /HPF
NRBC BLD AUTO-RTO: 0 /100 WBCS
PH UR STRIP.AUTO: 5.5 [PH]
PLATELET # BLD AUTO: 184 THOUSANDS/UL (ref 149–390)
PMV BLD AUTO: 10.3 FL (ref 8.9–12.7)
POTASSIUM SERPL-SCNC: 3.7 MMOL/L (ref 3.5–5.3)
PROT SERPL-MCNC: 7.1 G/DL (ref 6.4–8.4)
PROT UR STRIP-MCNC: NEGATIVE MG/DL
RBC # BLD AUTO: 4.47 MILLION/UL (ref 3.88–5.62)
RBC #/AREA URNS AUTO: ABNORMAL /HPF
RSV RNA RESP QL NAA+PROBE: NEGATIVE
SARS-COV-2 RNA RESP QL NAA+PROBE: NEGATIVE
SODIUM SERPL-SCNC: 137 MMOL/L (ref 135–147)
SP GR UR STRIP.AUTO: <1.005 (ref 1–1.03)
UROBILINOGEN UR STRIP-ACNC: <2 MG/DL
WBC # BLD AUTO: 6.58 THOUSAND/UL (ref 4.31–10.16)
WBC #/AREA URNS AUTO: ABNORMAL /HPF

## 2023-12-24 PROCEDURE — 96360 HYDRATION IV INFUSION INIT: CPT

## 2023-12-24 PROCEDURE — 0241U HB NFCT DS VIR RESP RNA 4 TRGT: CPT | Performed by: EMERGENCY MEDICINE

## 2023-12-24 PROCEDURE — 87493 C DIFF AMPLIFIED PROBE: CPT

## 2023-12-24 PROCEDURE — 99284 EMERGENCY DEPT VISIT MOD MDM: CPT

## 2023-12-24 PROCEDURE — 80053 COMPREHEN METABOLIC PANEL: CPT | Performed by: EMERGENCY MEDICINE

## 2023-12-24 PROCEDURE — 83690 ASSAY OF LIPASE: CPT | Performed by: EMERGENCY MEDICINE

## 2023-12-24 PROCEDURE — G1004 CDSM NDSC: HCPCS

## 2023-12-24 PROCEDURE — 85025 COMPLETE CBC W/AUTO DIFF WBC: CPT | Performed by: EMERGENCY MEDICINE

## 2023-12-24 PROCEDURE — 74177 CT ABD & PELVIS W/CONTRAST: CPT

## 2023-12-24 PROCEDURE — 36415 COLL VENOUS BLD VENIPUNCTURE: CPT

## 2023-12-24 PROCEDURE — 99285 EMERGENCY DEPT VISIT HI MDM: CPT

## 2023-12-24 PROCEDURE — 96361 HYDRATE IV INFUSION ADD-ON: CPT

## 2023-12-24 PROCEDURE — 81001 URINALYSIS AUTO W/SCOPE: CPT | Performed by: EMERGENCY MEDICINE

## 2023-12-24 RX ADMIN — IOHEXOL 80 ML: 350 INJECTION, SOLUTION INTRAVENOUS at 10:25

## 2023-12-24 RX ADMIN — SODIUM CHLORIDE 1000 ML: 0.9 INJECTION, SOLUTION INTRAVENOUS at 09:15

## 2023-12-24 NOTE — ED PROVIDER NOTES
History  Chief Complaint   Patient presents with    Diarrhea     Pt says he's had diarrhea for 3 weeks, pt states he has abd pain now and some bloody stools. Pt denies urinary symptoms .     This is an 77 y/o male with PMH HTN who presents to the ER today for diarrhea, abdominal pain x  3 weeks. Patient reports that 3 weeks ago he thought he had the flu because he had URI symptoms, abdominal pain, diarrhea. States it improved for a few days but then it came back worse. He said that he went to the bathroom 12-15 times since yesterday. He admits to small spots of blood in the stool. States his abdominal pain is more localized to lower abdomen. He denies any fevers, chills, changes in urination, chest pain, SOB, lightheadedness, dizziness. He denies having issues with this in the past.       History provided by:  Patient   used: No        Prior to Admission Medications   Prescriptions Last Dose Informant Patient Reported? Taking?   apixaban (ELIQUIS) 5 mg  Self No No   Sig: Take 1 tablet (5 mg total) by mouth 2 (two) times a day   aspirin (ECOTRIN LOW STRENGTH) 81 mg EC tablet  Self Yes No   Sig: Take 81 mg by mouth daily   lisinopril (ZESTRIL) 5 mg tablet  Self No No   Sig: Take 1 tablet (5 mg total) by mouth daily   metoprolol tartrate 75 MG TABS  Self No No   Sig: Take 1 tablet (75 mg total) by mouth every 12 (twelve) hours   omeprazole (PriLOSEC) 20 mg delayed release capsule  Self Yes No   Sig: Take 20 mg by mouth daily      Facility-Administered Medications: None       Past Medical History:   Diagnosis Date    Dysphagia     Hypertension        Past Surgical History:   Procedure Laterality Date    CHOLECYSTECTOMY LAPAROSCOPIC N/A 2/8/2021    Procedure: CHOLECYSTECTOMY LAPAROSCOPIC;  Surgeon: Wally Young MD;  Location: [facility] OR;  Service: General    CORONARY STENT PLACEMENT         History reviewed. No pertinent family history.  I have reviewed and agree with the history as  documented.    E-Cigarette/Vaping     E-Cigarette/Vaping Substances    Nicotine No     THC No     CBD No     Flavoring No     Other No     Unknown No      Social History     Tobacco Use    Smoking status: Never    Smokeless tobacco: Never   Substance Use Topics    Alcohol use: Yes     Alcohol/week: 1.0 standard drink of alcohol     Types: 1 Glasses of wine per week     Comment: daily    Drug use: Never       Review of Systems   Constitutional:  Negative for chills and fever.   HENT:  Negative for congestion.    Respiratory:  Negative for shortness of breath.    Cardiovascular:  Negative for chest pain.   Gastrointestinal:  Positive for abdominal pain, blood in stool and diarrhea. Negative for nausea and vomiting.   Skin:  Negative for color change.   Neurological:  Negative for headaches.   Psychiatric/Behavioral:  Negative for behavioral problems and sleep disturbance.    All other systems reviewed and are negative.      Physical Exam  Physical Exam  Vitals and nursing note reviewed.   Constitutional:       General: He is awake.      Appearance: Normal appearance. He is well-developed.   HENT:      Head: Normocephalic and atraumatic.      Right Ear: External ear normal.      Left Ear: External ear normal.      Nose: Nose normal.   Eyes:      General: No scleral icterus.     Extraocular Movements: Extraocular movements intact.   Cardiovascular:      Rate and Rhythm: Normal rate and regular rhythm.      Heart sounds: Normal heart sounds, S1 normal and S2 normal. No murmur heard.     No gallop.   Pulmonary:      Effort: Pulmonary effort is normal.      Breath sounds: Normal breath sounds. No wheezing, rhonchi or rales.   Abdominal:      General: Abdomen is flat. Bowel sounds are normal.      Palpations: Abdomen is soft.      Tenderness: There is abdominal tenderness. There is no guarding or rebound.   Musculoskeletal:         General: Normal range of motion.      Cervical back: Normal range of motion.   Skin:      General: Skin is warm and dry.   Neurological:      General: No focal deficit present.      Mental Status: He is alert.   Psychiatric:         Attention and Perception: Attention and perception normal.         Mood and Affect: Mood normal.         Behavior: Behavior normal. Behavior is cooperative.         Vital Signs  ED Triage Vitals [12/24/23 0855]   Temperature Pulse Respirations Blood Pressure SpO2   97.9 °F (36.6 °C) 85 18 121/56 97 %      Temp src Heart Rate Source Patient Position - Orthostatic VS BP Location FiO2 (%)   -- -- -- -- --      Pain Score       --           Vitals:    12/24/23 0855 12/24/23 1000 12/24/23 1100   BP: 121/56 130/68 125/60   Pulse: 85 68 59         Visual Acuity      ED Medications  Medications   sodium chloride 0.9 % bolus 1,000 mL (0 mL Intravenous Stopped 12/24/23 1147)   iohexol (OMNIPAQUE) 350 MG/ML injection (MULTI-DOSE) 80 mL (80 mL Intravenous Given 12/24/23 1025)       Diagnostic Studies  Results Reviewed       Procedure Component Value Units Date/Time    Urine Microscopic [[id number]]  (Abnormal) Collected: 12/24/23 1142    Lab Status: Final result Specimen: Urine, Clean Catch Updated: 12/24/23 1202     RBC, UA 4-10 /hpf      WBC, UA 2-4 /hpf      Epithelial Cells Occasional /hpf      Bacteria, UA Occasional /hpf     Narrative:      Microscopic performed by Carlita Vergara.     UA w Reflex to Microscopic w Reflex to Culture [[id number]]  (Abnormal) Collected: 12/24/23 1142    Lab Status: Final result Specimen: Urine, Clean Catch Updated: 12/24/23 1147     Color, UA Light Yellow     Clarity, UA Clear     Specific Gravity, UA <1.005     pH, UA 5.5     Leukocytes, UA Negative     Nitrite, UA Negative     Protein, UA Negative mg/dl      Glucose, UA Negative mg/dl      Ketones, UA Negative mg/dl      Urobilinogen, UA <2.0 mg/dl      Bilirubin, UA Negative     Occult Blood, UA Large    FLU/RSV/COVID - if FLU/RSV clinically relevant [[id number]]  (Normal) Collected: 12/24/23 0904    Lab  Status: Final result Specimen: Nares from Nose Updated: 12/24/23 0946     SARS-CoV-2 Negative     INFLUENZA A PCR Negative     INFLUENZA B PCR Negative     RSV PCR Negative    Narrative:      FOR PEDIATRIC PATIENTS - copy/paste COVID Guidelines URL to browser: [email or web address]    SARS-CoV-2 assay is a Nucleic Acid Amplification assay intended for the  qualitative detection of nucleic acid from SARS-CoV-2 in nasopharyngeal  swabs. Results are for the presumptive identification of SARS-CoV-2 RNA.    Positive results are indicative of infection with SARS-CoV-2, the virus  causing COVID-19, but do not rule out bacterial infection or co-infection  with other viruses. Laboratories within the [place] and its  territories are required to report all positive results to the appropriate  public health authorities. Negative results do not preclude SARS-CoV-2  infection and should not be used as the sole basis for treatment or other  patient management decisions. Negative results must be combined with  clinical observations, patient history, and epidemiological information.  This test has not been FDA cleared or approved.    This test has been authorized by FDA under an Emergency Use Authorization  (EUA). This test is only authorized for the duration of time the  declaration that circumstances exist justifying the authorization of the  emergency use of an in vitro diagnostic tests for detection of SARS-CoV-2  virus and/or diagnosis of COVID-19 infection under section 564(b)(1) of  the Act, 21 U.S.C. 360bbb-3(b)(1), unless the authorization is terminated  or revoked sooner. The test has been validated but independent review by FDA  and CLIA is pending.    Test performed using [employer]: This RT-PCR assay targets N2,  a region unique to SARS-CoV-2. A conserved region in the E-gene was chosen  for pan-Sarbecovirus detection which includes  SARS-CoV-2.    According to CMS-2020-01-R, this platform meets the definition of high-throughput technology.    Comprehensive metabolic panel [[id number]] Collected: 12/24/23 0904    Lab Status: Final result Specimen: Blood from Arm, Left Updated: 12/24/23 0930     Sodium 137 mmol/L      Potassium 3.7 mmol/L      Chloride 105 mmol/L      CO2 25 mmol/L      ANION GAP 7 mmol/L      BUN 11 mg/dL      Creatinine 1.14 mg/dL      Glucose 99 mg/dL      Calcium 9.3 mg/dL      AST 22 U/L      ALT 18 U/L      Alkaline Phosphatase 62 U/L      Total Protein 7.1 g/dL      Albumin 4.0 g/dL      Total Bilirubin 1.00 mg/dL      eGFR 60 ml/min/1.73sq m     Narrative:      National Kidney Disease Foundation guidelines for Chronic Kidney Disease (CKD):     Stage 1 with normal or high GFR (GFR > 90 mL/min/1.73 square meters)    Stage 2 Mild CKD (GFR = 60-89 mL/min/1.73 square meters)    Stage 3A Moderate CKD (GFR = 45-59 mL/min/1.73 square meters)    Stage 3B Moderate CKD (GFR = 30-44 mL/min/1.73 square meters)    Stage 4 Severe CKD (GFR = 15-29 mL/min/1.73 square meters)    Stage 5 End Stage CKD (GFR <15 mL/min/1.73 square meters)  Note: GFR calculation is accurate only with a steady state creatinine    Lipase [[id number]]  (Abnormal) Collected: 12/24/23 0904    Lab Status: Final result Specimen: Blood from Arm, Left Updated: 12/24/23 0930     Lipase 6 u/L     Stool Enteric Bacterial Panel by PCR [[id number]]     Lab Status: No result Specimen: Stool from Per Rectum     Clostridium difficile toxin by PCR with EIA [[id number]]     Lab Status: No result Specimen: Stool from Per Rectum     CBC and differential [[id number]] Collected: 12/24/23 0904    Lab Status: Final result Specimen: Blood from Arm, Left Updated: 12/24/23 0912     WBC 6.58 Thousand/uL      RBC 4.47 Million/uL      Hemoglobin 13.7 g/dL      Hematocrit 43.1 %      MCV 96 fL      MCH 30.6 pg      MCHC 31.8 g/dL      RDW 13.2 %      MPV 10.3 fL      Platelets 184 Thousands/uL       nRBC 0 /100 WBCs      Neutrophils Relative 46 %      Immat GRANS % 0 %      Lymphocytes Relative 37 %      Monocytes Relative 11 %      Eosinophils Relative 5 %      Basophils Relative 1 %      Neutrophils Absolute 3.09 Thousands/µL      Immature Grans Absolute 0.01 Thousand/uL      Lymphocytes Absolute 2.44 Thousands/µL      Monocytes Absolute 0.70 Thousand/µL      Eosinophils Absolute 0.30 Thousand/µL      Basophils Absolute 0.04 Thousands/µL                    CT abdomen pelvis with contrast   Final Result by Chris Chavez MD (12/24 7902)      1.  Possible mild colitis at the splenic flexure and distal descending colon. No abdominopelvic abscess.   2.  Trace amount of perihepatic ascites.            Workstation performed: [id number]                    Procedures  Procedures         ED Course             Medical Decision Making  77 y/o male here for diarrhea x 3 weeks  Differential diagnosis including but not limited to: colitis, diverticulitis, appendicitis, pacnreatitis, malignancy, viral illness, dehydration, electrolyte abnormality, metabolic dysfunction, MIRNA   Assessment: colitis  Plan:  labs unremarkable. CT showing mild colitis however with normal labs and blood in stool will not start abx at this time and wait for cultures. Patient unable to provide sample in ED, gave materials to give sample outpatient. Did send referral for gastroenterology and advised patient to follow up. Patient  was given strict return to ER precautions both verbally and in discharge papers. Patient verbalized understanding and agrees with plan.      Amount and/or Complexity of Data Reviewed  External Data Reviewed: notes.     Details: Admission from 10/12/23 for a fib RVR reviewed   Labs: ordered.  Radiology: ordered.    Risk  Prescription drug management.             Disposition  Final diagnoses:   Colitis     Time reflects when diagnosis was documented in both MDM as applicable and the Disposition within this note        Time User Action Codes Description Comment    12/24/2023 11:45 AM Damaris Kirby Add [K52.9] Colitis           ED Disposition       ED Disposition   Discharge    Condition   Stable    Date/Time   Sun Dec 24, 2023 11:45 AM    Comment   Leanderkristie Saab discharge to home/self care.                   Follow-up Information       Follow up With Specialties Details Why Contact Info Additional Information    [facility] Schedule an appointment as soon as possible for a visit   [address]  [address]  [facility] [address]  [phone] [facility], [address], [facility]  [address]     [phone]     [facility] Emergency Department Emergency Medicine Go to  If symptoms worsen [address] [facility] [address] [phone] [facility] Emergency Department, [address] [place], [place] [address]            Discharge Medication List as of 12/24/2023 12:06 PM        CONTINUE these medications which have NOT CHANGED    Details   apixaban (ELIQUIS) 5 mg Take 1 tablet (5 mg total) by mouth 2 (two) times a day, Starting Thu 2/11/2021, Until Tue 5/23/2023, Print      aspirin (ECOTRIN LOW STRENGTH) 81 mg EC tablet Take 81 mg by mouth daily, Historical Med      lisinopril (ZESTRIL) 5 mg tablet Take 1 tablet (5 mg total) by mouth daily, Starting Thu 2/11/2021, Until Wed 8/23/2023, Print      metoprolol tartrate 75 MG TABS Take 1 tablet (75 mg total) by mouth every 12 (twelve) hours, Starting Thu 2/11/2021, Until Wed 8/23/2023, Print      omeprazole (PriLOSEC) 20 mg delayed release capsule Take 20 mg by mouth daily, Historical Med             Outpatient Discharge Orders   Clostridium difficile toxin by PCR with EIA   Standing Status: Future Number of Occurrences: 1 Standing Exp. Date: 12/24/24     Stool Enteric  Bacterial Panel by PCR   Standing Status: Future Number of Occurrences: 1 Standing Exp. Date: 12/24/24       PDMP Review         Value Time User    PDMP Reviewed  Yes 10/14/2023  9:37 AM Philippe Nunez MD            ED Provider  Electronically Signed by             Damaris Kirby PA-C  12/24/23 3055

## 2023-12-26 LAB — C DIFF TOX GENS STL QL NAA+PROBE: NEGATIVE

## 2024-03-18 NOTE — PROGRESS NOTES
Patient Progress Note - General Surgery   Quynh Lambert 66 y o  male MRN: [id number]  Unit/Bed#: -01 Encounter: [id number]    Assessment:  65 y/o male admitted with bacteremia and new onset afib probable cholecystitis with sludge, probable colangitis   · Improved abdominal pain this morning  · Tolerated clears yesterday   · Blood culture pending, gram negative rods  · Leukocytosis resolved  · Tbili improved 2 10>1 8    Plan:  · Appreciate cardiology input, patient is low-intermediate risk for cholecystectomy  Continue perioperative telemetry to monitor for afib  Echo is pending, to be completed tomorrow prior to OR  · Continue Zosyn   · Plan for laparoscopic cholecystectomy tomorrow   · CLD, NPO at [facility] for planned lap ivon tomorrow   · Hold heparin drip at midnight prior to OR       Subjective/Objective   Chief Complaint: I am feeling better     Subjective: No acute overnight events  Pt denies nausea or vomiting with clears yesterday, denies any chest discomfort or SOB  He reports his abdominal pain has improved  Objective:     Blood pressure 133/70, pulse 83, temperature 97 8 °F (36 6 °C), temperature source Oral, resp  rate 18, height 5' 8" (1 727 m), weight 92 kg (202 lb 13 2 oz), SpO2 97 %  ,Body mass index is 30 84 kg/m²  Intake/Output Summary (Last 24 hours) at 2/7/2021 0920  Last data filed at 2/7/2021 0548  Gross per 24 hour   Intake 2979 36 ml   Output 950 ml   Net 2029 36 ml       Invasive Devices     Peripheral Intravenous Line            Peripheral IV 02/05/21 Left Antecubital 1 day    Peripheral IV 02/05/21 Right Antecubital 1 day    Peripheral IV 02/05/21 Right Hand 1 day                Physical Exam  Constitutional:       Appearance: Normal appearance  HENT:      Head: Normocephalic and atraumatic  Nose: Nose normal       Mouth/Throat:      Mouth: Mucous membranes are moist    Eyes:      Pupils: Pupils are equal, round, and reactive to light  Neck:      Musculoskeletal: Neck supple  Cardiovascular:      Rate and Rhythm: Normal rate  Rhythm irregular  Comments: , NSR with frequent ectopy on telemetry   Pulmonary:      Effort: Pulmonary effort is normal    Abdominal:      General: Abdomen is flat  Bowel sounds are normal  There is no distension  Palpations: Abdomen is soft  There is no mass  Tenderness: There is abdominal tenderness  There is no guarding  Hernia: A hernia is present  Comments: Reducible, non-tender supraumbilical hernia   Mild tenderness to deep palpation of RUQ, negative Tamayo's sign    Skin:     General: Skin is warm and dry  Neurological:      Mental Status: He is alert and oriented to person, place, and time     Psychiatric:         Mood and Affect: Mood normal          Behavior: Behavior normal          Lab, Imaging and other studies:  CBC:   Lab Results   Component Value Date    WBC 8 44 02/07/2021    HGB 12 9 02/07/2021    HCT 39 3 02/07/2021    MCV 99 (H) 02/07/2021     02/07/2021    MCH 32 4 02/07/2021    MCHC 32 8 02/07/2021    RDW 13 2 02/07/2021    MPV 10 7 02/07/2021    NRBC 0 02/07/2021   , CMP:   Lab Results   Component Value Date    SODIUM 134 (L) 02/07/2021    K 3 9 02/07/2021     02/07/2021    CO2 22 02/07/2021    BUN 17 02/07/2021    CREATININE 0 91 02/07/2021    CALCIUM 7 8 (L) 02/07/2021    AST 30 02/07/2021    ALT 31 02/07/2021    ALKPHOS 64 02/07/2021    EGFR 81 02/07/2021     VTE Pharmacologic Prophylaxis: Heparin  VTE Mechanical Prophylaxis: sequential compression device

## 2024-05-29 ENCOUNTER — APPOINTMENT (EMERGENCY)
Dept: CT IMAGING | Facility: HOSPITAL | Age: 79
DRG: 392 | End: 2024-05-29
Payer: COMMERCIAL

## 2024-05-29 ENCOUNTER — HOSPITAL ENCOUNTER (EMERGENCY)
Facility: HOSPITAL | Age: 79
Discharge: HOME | DRG: 392 | End: 2024-05-29
Attending: EMERGENCY MEDICINE
Payer: COMMERCIAL

## 2024-05-29 VITALS
DIASTOLIC BLOOD PRESSURE: 67 MMHG | BODY MASS INDEX: 28.04 KG/M2 | TEMPERATURE: 98.6 F | HEART RATE: 98 BPM | OXYGEN SATURATION: 93 % | WEIGHT: 185 LBS | RESPIRATION RATE: 18 BRPM | HEIGHT: 68 IN | SYSTOLIC BLOOD PRESSURE: 152 MMHG

## 2024-05-29 DIAGNOSIS — R10.9 ABDOMINAL PAIN: Primary | ICD-10-CM

## 2024-05-29 DIAGNOSIS — K52.9 COLITIS: ICD-10-CM

## 2024-05-29 LAB
2HR DELTA HS TROPONIN: 1 NG/L
ALBUMIN SERPL BCG-MCNC: 4 G/DL (ref 3.5–5)
ALP SERPL-CCNC: 57 U/L (ref 34–104)
ALT SERPL W P-5'-P-CCNC: 12 U/L (ref 7–52)
ANION GAP SERPL CALCULATED.3IONS-SCNC: 10 MMOL/L (ref 4–13)
APTT PPP: 40 SECONDS (ref 23–37)
AST SERPL W P-5'-P-CCNC: 18 U/L (ref 13–39)
BACTERIA UR QL AUTO: ABNORMAL /HPF
BASOPHILS # BLD AUTO: 0.03 THOUSANDS/ÂΜL (ref 0–0.1)
BASOPHILS NFR BLD AUTO: 0 % (ref 0–1)
BILIRUB SERPL-MCNC: 2.14 MG/DL (ref 0.2–1)
BILIRUB UR QL STRIP: NEGATIVE
BUN SERPL-MCNC: 27 MG/DL (ref 5–25)
CALCIUM SERPL-MCNC: 9.1 MG/DL (ref 8.4–10.2)
CARDIAC TROPONIN I PNL SERPL HS: 8 NG/L
CARDIAC TROPONIN I PNL SERPL HS: 9 NG/L
CHLORIDE SERPL-SCNC: 103 MMOL/L (ref 96–108)
CLARITY UR: CLEAR
CO2 SERPL-SCNC: 21 MMOL/L (ref 21–32)
COLOR UR: YELLOW
CREAT SERPL-MCNC: 1.14 MG/DL (ref 0.6–1.3)
EOSINOPHIL # BLD AUTO: 0.15 THOUSAND/ÂΜL (ref 0–0.61)
EOSINOPHIL NFR BLD AUTO: 2 % (ref 0–6)
ERYTHROCYTE [DISTWIDTH] IN BLOOD BY AUTOMATED COUNT: 12.2 % (ref 11.6–15.1)
FLUAV RNA RESP QL NAA+PROBE: NEGATIVE
FLUBV RNA RESP QL NAA+PROBE: NEGATIVE
GFR SERPL CREATININE-BSD FRML MDRD: 59 ML/MIN/1.73SQ M
GLUCOSE SERPL-MCNC: 99 MG/DL (ref 65–140)
GLUCOSE UR STRIP-MCNC: NEGATIVE MG/DL
HCT VFR BLD AUTO: 40.2 % (ref 36.5–49.3)
HGB BLD-MCNC: 12.9 G/DL (ref 12–17)
HGB UR QL STRIP.AUTO: ABNORMAL
IMM GRANULOCYTES # BLD AUTO: 0.05 THOUSAND/UL (ref 0–0.2)
IMM GRANULOCYTES NFR BLD AUTO: 1 % (ref 0–2)
INR PPP: 1.39 (ref 0.84–1.19)
KETONES UR STRIP-MCNC: ABNORMAL MG/DL
LACTATE SERPL-SCNC: 0.8 MMOL/L (ref 0.5–2)
LEUKOCYTE ESTERASE UR QL STRIP: NEGATIVE
LIPASE SERPL-CCNC: <6 U/L (ref 11–82)
LYMPHOCYTES # BLD AUTO: 2.61 THOUSANDS/ÂΜL (ref 0.6–4.47)
LYMPHOCYTES NFR BLD AUTO: 25 % (ref 14–44)
MAGNESIUM SERPL-MCNC: 1.6 MG/DL (ref 1.9–2.7)
MCH RBC QN AUTO: 31.9 PG (ref 26.8–34.3)
MCHC RBC AUTO-ENTMCNC: 32.1 G/DL (ref 31.4–37.4)
MCV RBC AUTO: 100 FL (ref 82–98)
MONOCYTES # BLD AUTO: 0.96 THOUSAND/ÂΜL (ref 0.17–1.22)
MONOCYTES NFR BLD AUTO: 9 % (ref 4–12)
NEUTROPHILS # BLD AUTO: 6.47 THOUSANDS/ÂΜL (ref 1.85–7.62)
NEUTS SEG NFR BLD AUTO: 63 % (ref 43–75)
NITRITE UR QL STRIP: NEGATIVE
NON-SQ EPI CELLS URNS QL MICRO: ABNORMAL /HPF
NRBC BLD AUTO-RTO: 0 /100 WBCS
PH UR STRIP.AUTO: 5.5 [PH]
PLATELET # BLD AUTO: 189 THOUSANDS/UL (ref 149–390)
PMV BLD AUTO: 10 FL (ref 8.9–12.7)
POTASSIUM SERPL-SCNC: 3.9 MMOL/L (ref 3.5–5.3)
PROT SERPL-MCNC: 7.1 G/DL (ref 6.4–8.4)
PROT UR STRIP-MCNC: NEGATIVE MG/DL
PROTHROMBIN TIME: 17.5 SECONDS (ref 11.6–14.5)
RBC # BLD AUTO: 4.04 MILLION/UL (ref 3.88–5.62)
RBC #/AREA URNS AUTO: ABNORMAL /HPF
RSV RNA RESP QL NAA+PROBE: NEGATIVE
SARS-COV-2 RNA RESP QL NAA+PROBE: NEGATIVE
SODIUM SERPL-SCNC: 134 MMOL/L (ref 135–147)
SP GR UR STRIP.AUTO: 1.01 (ref 1–1.03)
UROBILINOGEN UR STRIP-ACNC: <2 MG/DL
WBC # BLD AUTO: 10.27 THOUSAND/UL (ref 4.31–10.16)
WBC #/AREA URNS AUTO: ABNORMAL /HPF

## 2024-05-29 PROCEDURE — 85025 COMPLETE CBC W/AUTO DIFF WBC: CPT | Performed by: EMERGENCY MEDICINE

## 2024-05-29 PROCEDURE — 83605 ASSAY OF LACTIC ACID: CPT | Performed by: EMERGENCY MEDICINE

## 2024-05-29 PROCEDURE — 96365 THER/PROPH/DIAG IV INF INIT: CPT

## 2024-05-29 PROCEDURE — 99285 EMERGENCY DEPT VISIT HI MDM: CPT | Performed by: EMERGENCY MEDICINE

## 2024-05-29 PROCEDURE — 99284 EMERGENCY DEPT VISIT MOD MDM: CPT

## 2024-05-29 PROCEDURE — 93005 ELECTROCARDIOGRAM TRACING: CPT

## 2024-05-29 PROCEDURE — 80053 COMPREHEN METABOLIC PANEL: CPT | Performed by: EMERGENCY MEDICINE

## 2024-05-29 PROCEDURE — 36415 COLL VENOUS BLD VENIPUNCTURE: CPT

## 2024-05-29 PROCEDURE — 83735 ASSAY OF MAGNESIUM: CPT | Performed by: EMERGENCY MEDICINE

## 2024-05-29 PROCEDURE — 85610 PROTHROMBIN TIME: CPT | Performed by: EMERGENCY MEDICINE

## 2024-05-29 PROCEDURE — 84484 ASSAY OF TROPONIN QUANT: CPT | Performed by: EMERGENCY MEDICINE

## 2024-05-29 PROCEDURE — 83690 ASSAY OF LIPASE: CPT | Performed by: EMERGENCY MEDICINE

## 2024-05-29 PROCEDURE — 96367 TX/PROPH/DG ADDL SEQ IV INF: CPT

## 2024-05-29 PROCEDURE — 0241U HB NFCT DS VIR RESP RNA 4 TRGT: CPT | Performed by: EMERGENCY MEDICINE

## 2024-05-29 PROCEDURE — 85730 THROMBOPLASTIN TIME PARTIAL: CPT | Performed by: EMERGENCY MEDICINE

## 2024-05-29 PROCEDURE — 74177 CT ABD & PELVIS W/CONTRAST: CPT

## 2024-05-29 PROCEDURE — 81001 URINALYSIS AUTO W/SCOPE: CPT | Performed by: EMERGENCY MEDICINE

## 2024-05-29 RX ORDER — AMOXICILLIN AND CLAVULANATE POTASSIUM 875; 125 MG/1; MG/1
1 TABLET, FILM COATED ORAL EVERY 12 HOURS
Qty: 14 TABLET | Refills: 0 | Status: SHIPPED | OUTPATIENT
Start: 2024-05-29 | End: 2024-06-08

## 2024-05-29 RX ORDER — DICYCLOMINE HCL 20 MG
20 TABLET ORAL ONCE
Status: COMPLETED | OUTPATIENT
Start: 2024-05-29 | End: 2024-05-29

## 2024-05-29 RX ORDER — DICYCLOMINE HCL 20 MG
20 TABLET ORAL EVERY 6 HOURS PRN
Qty: 30 TABLET | Refills: 0 | Status: SHIPPED | OUTPATIENT
Start: 2024-05-29

## 2024-05-29 RX ORDER — MAGNESIUM SULFATE HEPTAHYDRATE 40 MG/ML
2 INJECTION, SOLUTION INTRAVENOUS ONCE
Status: DISCONTINUED | OUTPATIENT
Start: 2024-05-29 | End: 2024-05-29

## 2024-05-29 RX ORDER — METRONIDAZOLE 500 MG/1
500 TABLET ORAL ONCE
Status: DISCONTINUED | OUTPATIENT
Start: 2024-05-29 | End: 2024-05-29

## 2024-05-29 RX ORDER — MAGNESIUM SULFATE HEPTAHYDRATE 40 MG/ML
2 INJECTION, SOLUTION INTRAVENOUS ONCE
Status: COMPLETED | OUTPATIENT
Start: 2024-05-29 | End: 2024-05-29

## 2024-05-29 RX ORDER — AMOXICILLIN AND CLAVULANATE POTASSIUM 875; 125 MG/1; MG/1
1 TABLET, FILM COATED ORAL ONCE
Status: COMPLETED | OUTPATIENT
Start: 2024-05-29 | End: 2024-05-29

## 2024-05-29 RX ORDER — ACETAMINOPHEN 10 MG/ML
1000 INJECTION, SOLUTION INTRAVENOUS ONCE
Status: COMPLETED | OUTPATIENT
Start: 2024-05-29 | End: 2024-05-29

## 2024-05-29 RX ORDER — CIPROFLOXACIN 500 MG/1
500 TABLET, FILM COATED ORAL ONCE
Status: DISCONTINUED | OUTPATIENT
Start: 2024-05-29 | End: 2024-05-29

## 2024-05-29 RX ADMIN — IOHEXOL 100 ML: 350 INJECTION, SOLUTION INTRAVENOUS at 20:50

## 2024-05-29 RX ADMIN — MAGNESIUM SULFATE HEPTAHYDRATE 2 G: 2 INJECTION, SOLUTION INTRAVENOUS at 22:36

## 2024-05-29 RX ADMIN — AMOXICILLIN AND CLAVULANATE POTASSIUM 1 TABLET: 875; 125 TABLET, COATED ORAL at 22:11

## 2024-05-29 RX ADMIN — SODIUM CHLORIDE 1000 ML: 0.9 INJECTION, SOLUTION INTRAVENOUS at 21:56

## 2024-05-29 RX ADMIN — DICYCLOMINE HYDROCHLORIDE 20 MG: 20 TABLET ORAL at 22:11

## 2024-05-29 RX ADMIN — ACETAMINOPHEN 1000 MG: 10 INJECTION INTRAVENOUS at 22:13

## 2024-05-30 NOTE — ED PROVIDER NOTES
History  Chief Complaint   Patient presents with    Abdominal Pain     Pt reports abdominal pain that started 2-3 days ago. Denies n/v/d. Denies fevers.      79-year-old male with past history of hypertension, dysphagia, GERD, on Eliquis, presents to the ED for evaluation of right-sided abdominal pain over the past 2 to 3 days.  Patient states that he had a small amount of bowel movement last night.  Pain increases with movement and decreases with rest.  Patient came to the ED for further evaluation today for ongoing symptoms.  Patient denies any chest pain.  Patient denies any nausea, vomiting, diarrhea, dysuria, or any increased urinary frequency.  Patient denies any chest pain or shortness of breath.  Patient denies any fevers or chills.  Patient notes some abdominal bloating since symptom onset.      History provided by:  Patient  Abdominal Pain  Associated symptoms: no chest pain, no chills, no cough, no dysuria, no fever, no hematuria, no shortness of breath, no sore throat and no vomiting        Prior to Admission Medications   Prescriptions Last Dose Informant Patient Reported? Taking?   apixaban (ELIQUIS) 5 mg  Self No No   Sig: Take 1 tablet (5 mg total) by mouth 2 (two) times a day   aspirin (ECOTRIN LOW STRENGTH) 81 mg EC tablet  Self Yes No   Sig: Take 81 mg by mouth daily   lisinopril (ZESTRIL) 5 mg tablet  Self No No   Sig: Take 1 tablet (5 mg total) by mouth daily   metoprolol tartrate 75 MG TABS  Self No No   Sig: Take 1 tablet (75 mg total) by mouth every 12 (twelve) hours   omeprazole (PriLOSEC) 20 mg delayed release capsule  Self Yes No   Sig: Take 20 mg by mouth daily      Facility-Administered Medications: None       Past Medical History:   Diagnosis Date    Dysphagia     Hypertension        Past Surgical History:   Procedure Laterality Date    CHOLECYSTECTOMY LAPAROSCOPIC N/A 2/8/2021    Procedure: CHOLECYSTECTOMY LAPAROSCOPIC;  Surgeon: Wally Young MD;  Location:  MAIN OR;  Service: General     CORONARY STENT PLACEMENT         History reviewed. No pertinent family history.  I have reviewed and agree with the history as documented.    E-Cigarette/Vaping     E-Cigarette/Vaping Substances    Nicotine No     THC No     CBD No     Flavoring No     Other No     Unknown No      Social History     Tobacco Use    Smoking status: Never    Smokeless tobacco: Never   Substance Use Topics    Alcohol use: Yes     Alcohol/week: 1.0 standard drink of alcohol     Types: 1 Glasses of wine per week     Comment: daily    Drug use: Never       Review of Systems   Constitutional:  Negative for chills and fever.   HENT:  Negative for ear pain and sore throat.    Eyes:  Negative for pain and visual disturbance.   Respiratory:  Negative for cough and shortness of breath.    Cardiovascular:  Negative for chest pain and palpitations.   Gastrointestinal:  Positive for abdominal pain. Negative for vomiting.   Genitourinary:  Negative for dysuria and hematuria.   Musculoskeletal:  Negative for arthralgias and back pain.   Skin:  Negative for color change and rash.   Neurological:  Negative for seizures and syncope.   All other systems reviewed and are negative.      Physical Exam  Physical Exam  Vitals and nursing note reviewed.   Constitutional:       General: He is not in acute distress.     Appearance: He is well-developed.   HENT:      Head: Normocephalic and atraumatic.   Eyes:      Conjunctiva/sclera: Conjunctivae normal.   Cardiovascular:      Rate and Rhythm: Normal rate and regular rhythm.      Heart sounds: No murmur heard.  Pulmonary:      Effort: Pulmonary effort is normal. No respiratory distress.      Breath sounds: Normal breath sounds.   Abdominal:      General: Bowel sounds are normal. There is distension.      Palpations: Abdomen is soft.      Tenderness: There is abdominal tenderness.      Comments: Abdomen is soft, mildly distended, with bowel sound present all 4 quadrants.  Tenderness to palpation noted along  right side of abdomen that is most prominent in the right lower quadrant.   Musculoskeletal:         General: No swelling.      Cervical back: Neck supple.   Skin:     General: Skin is warm and dry.      Capillary Refill: Capillary refill takes less than 2 seconds.   Neurological:      Mental Status: He is alert.   Psychiatric:         Mood and Affect: Mood normal.         Vital Signs  ED Triage Vitals   Temperature Pulse Respirations Blood Pressure SpO2   05/29/24 1840 05/29/24 1840 05/29/24 1840 05/29/24 1842 05/29/24 1840   98.6 °F (37 °C) (!) 118 18 154/78 95 %      Temp Source Heart Rate Source Patient Position - Orthostatic VS BP Location FiO2 (%)   05/29/24 1840 05/29/24 1840 05/29/24 1842 05/29/24 1842 --   Oral Monitor Sitting Right arm       Pain Score       --                  Vitals:    05/29/24 2100 05/29/24 2130 05/29/24 2200 05/29/24 2230   BP: (!) 175/77 153/70 146/68 152/66   Pulse: (!) 106 101 100 93   Patient Position - Orthostatic VS:             Visual Acuity      ED Medications  Medications   iohexol (OMNIPAQUE) 350 MG/ML injection (SINGLE-DOSE) 100 mL (100 mL Intravenous Given 5/29/24 2050)   sodium chloride 0.9 % bolus 1,000 mL (1,000 mL Intravenous New Bag 5/29/24 2156)   magnesium sulfate 2 g/50 mL IVPB (premix) 2 g (2 g Intravenous New Bag 5/29/24 2236)   acetaminophen (Ofirmev) injection 1,000 mg (0 mg Intravenous Stopped 5/29/24 2236)   amoxicillin-clavulanate (AUGMENTIN) 875-125 mg per tablet 1 tablet (1 tablet Oral Given 5/29/24 2211)   dicyclomine (BENTYL) tablet 20 mg (20 mg Oral Given 5/29/24 2211)       Diagnostic Studies  Results Reviewed       Procedure Component Value Units Date/Time    HS Troponin I 2hr [[id number]]  (Normal) Collected: 05/29/24 2155    Lab Status: Final result Specimen: Blood from Arm, Left Updated: 05/29/24 2222     hs TnI 2hr 9 ng/L      Delta 2hr hsTnI 1 ng/L     FLU/RSV/COVID - if FLU/RSV clinically relevant [[id number]]  (Normal) Collected: 05/29/24 2002     Lab Status: Final result Specimen: Nares from Nasopharyngeal Swab Updated: 05/29/24 2047     SARS-CoV-2 Negative     INFLUENZA A PCR Negative     INFLUENZA B PCR Negative     RSV PCR Negative    Narrative:      FOR PEDIATRIC PATIENTS - copy/paste COVID Guidelines URL to browser: [email or web address]    SARS-CoV-2 assay is a Nucleic Acid Amplification assay intended for the  qualitative detection of nucleic acid from SARS-CoV-2 in nasopharyngeal  swabs. Results are for the presumptive identification of SARS-CoV-2 RNA.    Positive results are indicative of infection with SARS-CoV-2, the virus  causing COVID-19, but do not rule out bacterial infection or co-infection  with other viruses. Laboratories within the [place] and its  territories are required to report all positive results to the appropriate  public health authorities. Negative results do not preclude SARS-CoV-2  infection and should not be used as the sole basis for treatment or other  patient management decisions. Negative results must be combined with  clinical observations, patient history, and epidemiological information.  This test has not been FDA cleared or approved.    This test has been authorized by FDA under an Emergency Use Authorization  (EUA). This test is only authorized for the duration of time the  declaration that circumstances exist justifying the authorization of the  emergency use of an in vitro diagnostic tests for detection of SARS-CoV-2  virus and/or diagnosis of COVID-19 infection under section 564(b)(1) of  the Act, 21 U.S.C. 360bbb-3(b)(1), unless the authorization is terminated  or revoked sooner. The test has been validated but independent review by FDA  and CLIA is pending.    Test performed using [employer]: This RT-PCR assay targets N2,  a region unique to SARS-CoV-2. A conserved region in the E-gene was chosen  for pan-Sarbecovirus detection which includes  SARS-CoV-2.    According to CMS-2020-01-R, this platform meets the definition of high-throughput technology.    Protime-INR [[id number]]  (Abnormal) Collected: 05/29/24 2002    Lab Status: Final result Specimen: Blood from Arm, Left Updated: 05/29/24 2038     Protime 17.5 seconds      INR 1.39    APTT [[id number]]  (Abnormal) Collected: 05/29/24 2002    Lab Status: Final result Specimen: Blood from Arm, Left Updated: 05/29/24 2038     PTT 40 seconds     HS Troponin I 4hr [[id number]]     Lab Status: No result Specimen: Blood     HS Troponin 0hr (reflex protocol) [[id number]]  (Normal) Collected: 05/29/24 2002    Lab Status: Final result Specimen: Blood from Arm, Left Updated: 05/29/24 2037     hs TnI 0hr 8 ng/L     Urine Microscopic [[id number]]  (Abnormal) Collected: 05/29/24 2006    Lab Status: Final result Specimen: Urine, Clean Catch Updated: 05/29/24 2031     RBC, UA 4-10 /hpf      WBC, UA 0-1 /hpf      Epithelial Cells Occasional /hpf      Bacteria, UA Occasional /hpf     Narrative:      Microscopic performed by Yolette Lo.     Lactic acid, plasma (w/reflex if result > 2.0) [[id number]]  (Normal) Collected: 05/29/24 2002    Lab Status: Final result Specimen: Blood from Arm, Left Updated: 05/29/24 2030     LACTIC ACID 0.8 mmol/L     Narrative:      Result may be elevated if tourniquet was used during collection.    UA w Reflex to Microscopic w Reflex to Culture [[id number]]  (Abnormal) Collected: 05/29/24 2006    Lab Status: Final result Specimen: Urine, Clean Catch Updated: 05/29/24 2016     Color, UA Yellow     Clarity, UA Clear     Specific Gravity, UA 1.015     pH, UA 5.5     Leukocytes, UA Negative     Nitrite, UA Negative     Protein, UA Negative mg/dl      Glucose, UA Negative mg/dl      Ketones, UA 10 (1+) mg/dl      Urobilinogen, UA <2.0 mg/dl      Bilirubin, UA Negative     Occult Blood, UA Large    Comprehensive metabolic panel [[id number]]  (Abnormal) Collected: 05/29/24 1845    Lab Status: Final result  Specimen: Blood from Arm, Left Updated: 05/29/24 1938     Sodium 134 mmol/L      Potassium 3.9 mmol/L      Chloride 103 mmol/L      CO2 21 mmol/L      ANION GAP 10 mmol/L      BUN 27 mg/dL      Creatinine 1.14 mg/dL      Glucose 99 mg/dL      Calcium 9.1 mg/dL      AST 18 U/L      ALT 12 U/L      Alkaline Phosphatase 57 U/L      Total Protein 7.1 g/dL      Albumin 4.0 g/dL      Total Bilirubin 2.14 mg/dL      eGFR 59 ml/min/1.73sq m     Narrative:      National Kidney Disease Foundation guidelines for Chronic Kidney Disease (CKD):     Stage 1 with normal or high GFR (GFR > 90 mL/min/1.73 square meters)    Stage 2 Mild CKD (GFR = 60-89 mL/min/1.73 square meters)    Stage 3A Moderate CKD (GFR = 45-59 mL/min/1.73 square meters)    Stage 3B Moderate CKD (GFR = 30-44 mL/min/1.73 square meters)    Stage 4 Severe CKD (GFR = 15-29 mL/min/1.73 square meters)    Stage 5 End Stage CKD (GFR <15 mL/min/1.73 square meters)  Note: GFR calculation is accurate only with a steady state creatinine    Lipase [[id number]]  (Abnormal) Collected: 05/29/24 1845    Lab Status: Final result Specimen: Blood from Arm, Left Updated: 05/29/24 1938     Lipase <6 u/L     Magnesium [[id number]]  (Abnormal) Collected: 05/29/24 1845    Lab Status: Final result Specimen: Blood from Arm, Left Updated: 05/29/24 1927     Magnesium 1.6 mg/dL     CBC and differential [[id number]]  (Abnormal) Collected: 05/29/24 1845    Lab Status: Final result Specimen: Blood from Arm, Left Updated: 05/29/24 1851     WBC 10.27 Thousand/uL      RBC 4.04 Million/uL      Hemoglobin 12.9 g/dL      Hematocrit 40.2 %       fL      MCH 31.9 pg      MCHC 32.1 g/dL      RDW 12.2 %      MPV 10.0 fL      Platelets 189 Thousands/uL      nRBC 0 /100 WBCs      Segmented % 63 %      Immature Grans % 1 %      Lymphocytes % 25 %      Monocytes % 9 %      Eosinophils Relative 2 %      Basophils Relative 0 %      Absolute Neutrophils 6.47 Thousands/µL      Absolute Immature Grans 0.05  Thousand/uL      Absolute Lymphocytes 2.61 Thousands/µL      Absolute Monocytes 0.96 Thousand/µL      Eosinophils Absolute 0.15 Thousand/µL      Basophils Absolute 0.03 Thousands/µL                    CT abdomen pelvis with contrast   Final Result by Mathew Tinsley MD (05/29 2137)      Short segment bowel wall thickening at the hepatic flexure with adjacent fatty stranding suggesting nonspecific focal colitis (versus diverticulitis although considered less likely as no focally inflamed diverticulum identified). Recommend clinical    correlation.      Trace perihepatic ascites similar in appearance to prior. No free air or discrete abdominal pelvic collection.         Workstation performed: [id number]                    Procedures  ECG 12 Lead Documentation Only    Date/Time: 5/29/2024 8:10 PM    Performed by: Nereida Hanson DO  Authorized by: Nereida Hanson DO    Indications / Diagnosis:  Abdominal pain  ECG reviewed by me, the ED Provider: yes    Patient location:  ED  Previous ECG:     Previous ECG:  Compared to current    Similarity:  Changes noted    Comparison to cardiac monitor: Yes    Interpretation:     Interpretation: non-specific    Comments:      Sinus rhythm, rate 105, normal axis, right bundle branch block pattern noted, mild ST elevations noted in lead III that is nonspecific, ST elevation in the single lead is new compared to previous EKG.           ED Course  ED Course as of 05/29/24 2310   Wed May 29, 2024   2024 Patient is EKG today showed concern for very mild ST elevations in lead III.  Otherwise right bundle branch block pattern noted that is unchanged from previous study.  I reviewed previous EKG and today's EKG with cardiologist on-call who agrees that this is not a STEMI as ST elevation is isolated to one lead and is very mild.  Patient is not having any active chest pain.                               SBIRT 20yo+      Flowsheet Row Most Recent Value   Initial Alcohol Screen: US AUDIT-C      1. How often do you have a drink containing alcohol? 0 Filed at: 05/29/2024 1841   2. How many drinks containing alcohol do you have on a typical day you are drinking?  0 Filed at: 05/29/2024 1841   3a. Male UNDER 65: How often do you have five or more drinks on one occasion? 0 Filed at: 05/29/2024 1841   3b. FEMALE Any Age, or MALE 65+: How often do you have 4 or more drinks on one occassion? 0 Filed at: 05/29/2024 1841   Audit-C Score 0 Filed at: 05/29/2024 1841   EFREM: How many times in the past year have you...    Used an illegal drug or used a prescription medication for non-medical reasons? Never Filed at: 05/29/2024 1841                      Medical Decision Making  Obtain blood work, UA, CT abdomen/pelvis  Give IV fluids, her medication continue to monitor patient for any worsening symptoms    Patient's blood work shows very mild leukocytosis.  CT scan showed concern for colitis versus diverticulitis in the hepatic flexure.  This may be the source of patient's pain.  Patient's electrolytes was low in the ED and it was repleted.  Patient felt better with medications given in the ED.  At this time patient empirically started on Augmentin and discharged with follow-up to PCP.  Patient's EKG showed baseline right bundle branch block however there was one lead that shows some very mild ST elevations.  EKGs were sent to cardiologist on-call who reviewed today's EKG as well as past EKG and agrees that this is not STEMI or acute coronary syndrome.  Patient did not have any chest pains.  Patient had 2 consecutive troponin that was less than 12.    Close return instructions given to return to the ER for any worsening symptoms.  Patient agrees with discharge plan.  Patient well appearing at time of discharge.    Please Note: Flu[employer] voice recognition software may have been used in the creation of this document. Wrong words or sound a like substitutions may have occurred due to the inherent limitations of the  voice software.         Amount and/or Complexity of Data Reviewed  Labs: ordered. Decision-making details documented in ED Course.  Radiology: ordered. Decision-making details documented in ED Course.  ECG/medicine tests: ordered and independent interpretation performed. Decision-making details documented in ED Course.    Risk  Prescription drug management.             Disposition  Final diagnoses:   Abdominal pain   Colitis     Time reflects when diagnosis was documented in both MDM as applicable and the Disposition within this note       Time User Action Codes Description Comment    5/29/2024 10:04 PM Nereida Hanson Add [R10.9] Abdominal pain     5/29/2024 10:05 PM Nereida Hanson Add [K52.9] Colitis           ED Disposition       ED Disposition   Discharge    Condition   Stable    Date/Time   Wed May 29, 2024 2208    Comment   Leadner Saab discharge to home/self care.                   Follow-up Information       Follow up With Specialties Details Why Contact Info Additional Information    Ashley Gibson MD Family Medicine In 3 days  [facility]  [address] [facility] [address]  [phone]       [facility]   [facility] [address]  [facility] [address]  [phone] [address] [address], [place], [place], [address] [phone]            Patient's Medications   Discharge Prescriptions    AMOXICILLIN-CLAVULANATE (AUGMENTIN) 875-125 MG PER TABLET    Take 1 tablet by mouth every 12 (twelve) hours for 10 days       Start Date: 5/29/2024 End Date: 6/8/2024       Order Dose: 1 tablet       Quantity: 14 tablet    Refills: 0    DICYCLOMINE (BENTYL) 20 MG TABLET    Take 1 tablet (20 mg total) by mouth every 6 (six) hours as needed (abd pain)       Start Date: 5/29/2024 End Date: --       Order Dose: 20 mg       Quantity: 30 tablet    Refills: 0       No discharge procedures on  file.    PDMP Review         Value Time User    PDMP Reviewed  Yes 10/14/2023  9:37 AM Philippe Nunez MD            ED Provider  Electronically Signed by             Nereida Hanson DO  05/29/24 9838

## 2024-05-31 ENCOUNTER — APPOINTMENT (EMERGENCY)
Dept: RADIOLOGY | Facility: HOSPITAL | Age: 79
DRG: 392 | End: 2024-05-31
Payer: COMMERCIAL

## 2024-05-31 ENCOUNTER — APPOINTMENT (EMERGENCY)
Dept: CT IMAGING | Facility: HOSPITAL | Age: 79
DRG: 392 | End: 2024-05-31
Payer: COMMERCIAL

## 2024-05-31 ENCOUNTER — HOSPITAL ENCOUNTER (INPATIENT)
Facility: HOSPITAL | Age: 79
LOS: 2 days | Discharge: HOME | DRG: 392 | End: 2024-06-03
Attending: EMERGENCY MEDICINE | Admitting: INTERNAL MEDICINE
Payer: COMMERCIAL

## 2024-05-31 DIAGNOSIS — R07.81 PLEURITIC CHEST PAIN: ICD-10-CM

## 2024-05-31 DIAGNOSIS — R07.9 CHEST PAIN, UNSPECIFIED TYPE: ICD-10-CM

## 2024-05-31 DIAGNOSIS — R10.9 ABDOMINAL PAIN: Primary | ICD-10-CM

## 2024-05-31 LAB
2HR DELTA HS TROPONIN: 0 NG/L
ALBUMIN SERPL BCG-MCNC: 3.7 G/DL (ref 3.5–5)
ALP SERPL-CCNC: 57 U/L (ref 34–104)
ALT SERPL W P-5'-P-CCNC: 13 U/L (ref 7–52)
ANION GAP SERPL CALCULATED.3IONS-SCNC: 8 MMOL/L (ref 4–13)
APTT PPP: 49 SECONDS (ref 23–37)
AST SERPL W P-5'-P-CCNC: 17 U/L (ref 13–39)
ATRIAL RATE: 105 BPM
BACTERIA UR QL AUTO: ABNORMAL /HPF
BASOPHILS # BLD AUTO: 0.01 THOUSANDS/ÂΜL (ref 0–0.1)
BASOPHILS NFR BLD AUTO: 0 % (ref 0–1)
BILIRUB SERPL-MCNC: 1.12 MG/DL (ref 0.2–1)
BILIRUB UR QL STRIP: NEGATIVE
BUN SERPL-MCNC: 21 MG/DL (ref 5–25)
CALCIUM SERPL-MCNC: 8.9 MG/DL (ref 8.4–10.2)
CARDIAC TROPONIN I PNL SERPL HS: 11 NG/L
CARDIAC TROPONIN I PNL SERPL HS: 11 NG/L
CHLORIDE SERPL-SCNC: 101 MMOL/L (ref 96–108)
CLARITY UR: CLEAR
CO2 SERPL-SCNC: 23 MMOL/L (ref 21–32)
COLOR UR: YELLOW
CREAT SERPL-MCNC: 1.28 MG/DL (ref 0.6–1.3)
EOSINOPHIL # BLD AUTO: 0.25 THOUSAND/ÂΜL (ref 0–0.61)
EOSINOPHIL NFR BLD AUTO: 3 % (ref 0–6)
ERYTHROCYTE [DISTWIDTH] IN BLOOD BY AUTOMATED COUNT: 12.3 % (ref 11.6–15.1)
GFR SERPL CREATININE-BSD FRML MDRD: 52 ML/MIN/1.73SQ M
GLUCOSE SERPL-MCNC: 145 MG/DL (ref 65–140)
GLUCOSE UR STRIP-MCNC: NEGATIVE MG/DL
HCT VFR BLD AUTO: 39.6 % (ref 36.5–49.3)
HGB BLD-MCNC: 12.9 G/DL (ref 12–17)
HGB UR QL STRIP.AUTO: ABNORMAL
IMM GRANULOCYTES # BLD AUTO: 0.04 THOUSAND/UL (ref 0–0.2)
IMM GRANULOCYTES NFR BLD AUTO: 1 % (ref 0–2)
INR PPP: 1.44 (ref 0.84–1.19)
KETONES UR STRIP-MCNC: NEGATIVE MG/DL
LACTATE SERPL-SCNC: 1 MMOL/L (ref 0.5–2)
LEUKOCYTE ESTERASE UR QL STRIP: NEGATIVE
LYMPHOCYTES # BLD AUTO: 2.21 THOUSANDS/ÂΜL (ref 0.6–4.47)
LYMPHOCYTES NFR BLD AUTO: 26 % (ref 14–44)
MAGNESIUM SERPL-MCNC: 2 MG/DL (ref 1.9–2.7)
MCH RBC QN AUTO: 32.3 PG (ref 26.8–34.3)
MCHC RBC AUTO-ENTMCNC: 32.6 G/DL (ref 31.4–37.4)
MCV RBC AUTO: 99 FL (ref 82–98)
MONOCYTES # BLD AUTO: 0.81 THOUSAND/ÂΜL (ref 0.17–1.22)
MONOCYTES NFR BLD AUTO: 9 % (ref 4–12)
NEUTROPHILS # BLD AUTO: 5.27 THOUSANDS/ÂΜL (ref 1.85–7.62)
NEUTS SEG NFR BLD AUTO: 61 % (ref 43–75)
NITRITE UR QL STRIP: NEGATIVE
NON-SQ EPI CELLS URNS QL MICRO: ABNORMAL /HPF
NRBC BLD AUTO-RTO: 0 /100 WBCS
P AXIS: 62 DEGREES
PH UR STRIP.AUTO: 5.5 [PH]
PLATELET # BLD AUTO: 210 THOUSANDS/UL (ref 149–390)
PMV BLD AUTO: 10.2 FL (ref 8.9–12.7)
POTASSIUM SERPL-SCNC: 3.8 MMOL/L (ref 3.5–5.3)
PR INTERVAL: 182 MS
PROCALCITONIN SERPL-MCNC: 0.33 NG/ML
PROT SERPL-MCNC: 7.1 G/DL (ref 6.4–8.4)
PROT UR STRIP-MCNC: ABNORMAL MG/DL
PROTHROMBIN TIME: 18 SECONDS (ref 11.6–14.5)
QRS AXIS: 78 DEGREES
QRSD INTERVAL: 108 MS
QT INTERVAL: 374 MS
QTC INTERVAL: 494 MS
RBC # BLD AUTO: 3.99 MILLION/UL (ref 3.88–5.62)
RBC #/AREA URNS AUTO: ABNORMAL /HPF
SODIUM SERPL-SCNC: 132 MMOL/L (ref 135–147)
SP GR UR STRIP.AUTO: 1.01 (ref 1–1.03)
T WAVE AXIS: 30 DEGREES
UROBILINOGEN UR STRIP-ACNC: <2 MG/DL
VENTRICULAR RATE: 105 BPM
WBC # BLD AUTO: 8.59 THOUSAND/UL (ref 4.31–10.16)
WBC #/AREA URNS AUTO: ABNORMAL /HPF

## 2024-05-31 PROCEDURE — 74177 CT ABD & PELVIS W/CONTRAST: CPT

## 2024-05-31 PROCEDURE — 71045 X-RAY EXAM CHEST 1 VIEW: CPT

## 2024-05-31 PROCEDURE — 81001 URINALYSIS AUTO W/SCOPE: CPT

## 2024-05-31 PROCEDURE — 99285 EMERGENCY DEPT VISIT HI MDM: CPT

## 2024-05-31 PROCEDURE — 85730 THROMBOPLASTIN TIME PARTIAL: CPT

## 2024-05-31 PROCEDURE — 83605 ASSAY OF LACTIC ACID: CPT

## 2024-05-31 PROCEDURE — 36415 COLL VENOUS BLD VENIPUNCTURE: CPT

## 2024-05-31 PROCEDURE — 71275 CT ANGIOGRAPHY CHEST: CPT

## 2024-05-31 PROCEDURE — 83735 ASSAY OF MAGNESIUM: CPT

## 2024-05-31 PROCEDURE — 85610 PROTHROMBIN TIME: CPT

## 2024-05-31 PROCEDURE — 85025 COMPLETE CBC W/AUTO DIFF WBC: CPT

## 2024-05-31 PROCEDURE — 80053 COMPREHEN METABOLIC PANEL: CPT

## 2024-05-31 PROCEDURE — 93005 ELECTROCARDIOGRAM TRACING: CPT

## 2024-05-31 PROCEDURE — 84145 PROCALCITONIN (PCT): CPT

## 2024-05-31 PROCEDURE — 93010 ELECTROCARDIOGRAM REPORT: CPT | Performed by: INTERNAL MEDICINE

## 2024-05-31 PROCEDURE — 87040 BLOOD CULTURE FOR BACTERIA: CPT

## 2024-05-31 PROCEDURE — 84484 ASSAY OF TROPONIN QUANT: CPT

## 2024-05-31 RX ADMIN — IOHEXOL 50 ML: 240 INJECTION, SOLUTION INTRATHECAL; INTRAVASCULAR; INTRAVENOUS; ORAL at 23:17

## 2024-05-31 RX ADMIN — SODIUM CHLORIDE 250 ML: 0.9 INJECTION, SOLUTION INTRAVENOUS at 23:32

## 2024-05-31 RX ADMIN — IOHEXOL 100 ML: 350 INJECTION, SOLUTION INTRAVENOUS at 23:17

## 2024-06-01 PROBLEM — R07.9 CHEST PAIN: Status: ACTIVE | Noted: 2024-06-01

## 2024-06-01 PROBLEM — K52.9 ENTERITIS: Status: ACTIVE | Noted: 2024-06-01

## 2024-06-01 LAB
ALBUMIN SERPL BCG-MCNC: 3.3 G/DL (ref 3.5–5)
ALP SERPL-CCNC: 51 U/L (ref 34–104)
ALT SERPL W P-5'-P-CCNC: 11 U/L (ref 7–52)
ANION GAP SERPL CALCULATED.3IONS-SCNC: 9 MMOL/L (ref 4–13)
AST SERPL W P-5'-P-CCNC: 15 U/L (ref 13–39)
BILIRUB DIRECT SERPL-MCNC: 0.21 MG/DL (ref 0–0.2)
BILIRUB SERPL-MCNC: 1.25 MG/DL (ref 0.2–1)
BUN SERPL-MCNC: 18 MG/DL (ref 5–25)
CALCIUM SERPL-MCNC: 8.1 MG/DL (ref 8.4–10.2)
CARDIAC TROPONIN I PNL SERPL HS: 12 NG/L (ref 8–18)
CHLORIDE SERPL-SCNC: 102 MMOL/L (ref 96–108)
CO2 SERPL-SCNC: 21 MMOL/L (ref 21–32)
CREAT SERPL-MCNC: 1.19 MG/DL (ref 0.6–1.3)
CRP SERPL QL: 195.3 MG/L
ERYTHROCYTE [DISTWIDTH] IN BLOOD BY AUTOMATED COUNT: 12.1 % (ref 11.6–15.1)
ERYTHROCYTE [SEDIMENTATION RATE] IN BLOOD: 48 MM/HOUR (ref 0–19)
GFR SERPL CREATININE-BSD FRML MDRD: 56 ML/MIN/1.73SQ M
GLUCOSE P FAST SERPL-MCNC: 107 MG/DL (ref 65–99)
GLUCOSE SERPL-MCNC: 107 MG/DL (ref 65–140)
HCT VFR BLD AUTO: 35.9 % (ref 36.5–49.3)
HGB BLD-MCNC: 11.9 G/DL (ref 12–17)
MAGNESIUM SERPL-MCNC: 2 MG/DL (ref 1.9–2.7)
MCH RBC QN AUTO: 32.2 PG (ref 26.8–34.3)
MCHC RBC AUTO-ENTMCNC: 33.1 G/DL (ref 31.4–37.4)
MCV RBC AUTO: 97 FL (ref 82–98)
PHOSPHATE SERPL-MCNC: 3.6 MG/DL (ref 2.3–4.1)
PLATELET # BLD AUTO: 210 THOUSANDS/UL (ref 149–390)
PMV BLD AUTO: 10 FL (ref 8.9–12.7)
POTASSIUM SERPL-SCNC: 3.9 MMOL/L (ref 3.5–5.3)
PROT SERPL-MCNC: 6.4 G/DL (ref 6.4–8.4)
RBC # BLD AUTO: 3.7 MILLION/UL (ref 3.88–5.62)
SODIUM SERPL-SCNC: 132 MMOL/L (ref 135–147)
WBC # BLD AUTO: 8.06 THOUSAND/UL (ref 4.31–10.16)

## 2024-06-01 PROCEDURE — 93005 ELECTROCARDIOGRAM TRACING: CPT

## 2024-06-01 PROCEDURE — 84484 ASSAY OF TROPONIN QUANT: CPT | Performed by: INTERNAL MEDICINE

## 2024-06-01 PROCEDURE — 99223 1ST HOSP IP/OBS HIGH 75: CPT | Performed by: INTERNAL MEDICINE

## 2024-06-01 PROCEDURE — 85652 RBC SED RATE AUTOMATED: CPT | Performed by: INTERNAL MEDICINE

## 2024-06-01 PROCEDURE — 80048 BASIC METABOLIC PNL TOTAL CA: CPT | Performed by: INTERNAL MEDICINE

## 2024-06-01 PROCEDURE — 87086 URINE CULTURE/COLONY COUNT: CPT | Performed by: INTERNAL MEDICINE

## 2024-06-01 PROCEDURE — 80076 HEPATIC FUNCTION PANEL: CPT | Performed by: INTERNAL MEDICINE

## 2024-06-01 PROCEDURE — 83735 ASSAY OF MAGNESIUM: CPT | Performed by: INTERNAL MEDICINE

## 2024-06-01 PROCEDURE — 86140 C-REACTIVE PROTEIN: CPT | Performed by: INTERNAL MEDICINE

## 2024-06-01 PROCEDURE — 84100 ASSAY OF PHOSPHORUS: CPT | Performed by: INTERNAL MEDICINE

## 2024-06-01 PROCEDURE — 96365 THER/PROPH/DIAG IV INF INIT: CPT

## 2024-06-01 PROCEDURE — 85027 COMPLETE CBC AUTOMATED: CPT | Performed by: INTERNAL MEDICINE

## 2024-06-01 RX ORDER — ACETAMINOPHEN 325 MG/1
650 TABLET ORAL EVERY 8 HOURS SCHEDULED
Status: DISCONTINUED | OUTPATIENT
Start: 2024-06-01 | End: 2024-06-03 | Stop reason: HOSPADM

## 2024-06-01 RX ORDER — SENNOSIDES 8.6 MG
1 TABLET ORAL
Status: DISCONTINUED | OUTPATIENT
Start: 2024-06-01 | End: 2024-06-03 | Stop reason: HOSPADM

## 2024-06-01 RX ORDER — METOPROLOL TARTRATE 50 MG/1
100 TABLET, FILM COATED ORAL EVERY 12 HOURS SCHEDULED
Status: DISCONTINUED | OUTPATIENT
Start: 2024-06-01 | End: 2024-06-03 | Stop reason: HOSPADM

## 2024-06-01 RX ORDER — LOSARTAN POTASSIUM 25 MG/1
25 TABLET ORAL DAILY
Status: DISCONTINUED | OUTPATIENT
Start: 2024-06-01 | End: 2024-06-03 | Stop reason: HOSPADM

## 2024-06-01 RX ORDER — DICYCLOMINE HCL 20 MG
20 TABLET ORAL EVERY 6 HOURS PRN
Status: DISCONTINUED | OUTPATIENT
Start: 2024-06-01 | End: 2024-06-03 | Stop reason: HOSPADM

## 2024-06-01 RX ORDER — CEFTRIAXONE 1 G/50ML
1000 INJECTION, SOLUTION INTRAVENOUS EVERY 24 HOURS
Status: DISCONTINUED | OUTPATIENT
Start: 2024-06-01 | End: 2024-06-03 | Stop reason: HOSPADM

## 2024-06-01 RX ORDER — LOSARTAN POTASSIUM 25 MG/1
25 TABLET ORAL DAILY
COMMUNITY
Start: 2024-04-30

## 2024-06-01 RX ORDER — AMOXICILLIN AND CLAVULANATE POTASSIUM 875; 125 MG/1; MG/1
1 TABLET, FILM COATED ORAL EVERY 12 HOURS
Status: DISCONTINUED | OUTPATIENT
Start: 2024-06-01 | End: 2024-06-01

## 2024-06-01 RX ORDER — LISINOPRIL 5 MG/1
5 TABLET ORAL DAILY
Status: DISCONTINUED | OUTPATIENT
Start: 2024-06-01 | End: 2024-06-01

## 2024-06-01 RX ORDER — ONDANSETRON 2 MG/ML
4 INJECTION INTRAMUSCULAR; INTRAVENOUS EVERY 6 HOURS PRN
Status: DISCONTINUED | OUTPATIENT
Start: 2024-06-01 | End: 2024-06-01

## 2024-06-01 RX ORDER — MAGNESIUM HYDROXIDE/ALUMINUM HYDROXICE/SIMETHICONE 120; 1200; 1200 MG/30ML; MG/30ML; MG/30ML
30 SUSPENSION ORAL EVERY 6 HOURS PRN
Status: DISCONTINUED | OUTPATIENT
Start: 2024-06-01 | End: 2024-06-03 | Stop reason: HOSPADM

## 2024-06-01 RX ORDER — PANTOPRAZOLE SODIUM 40 MG/1
40 TABLET, DELAYED RELEASE ORAL
Status: DISCONTINUED | OUTPATIENT
Start: 2024-06-01 | End: 2024-06-03 | Stop reason: HOSPADM

## 2024-06-01 RX ADMIN — PIPERACILLIN SODIUM AND TAZOBACTAM SODIUM 4.5 G: 4; .5 INJECTION, POWDER, LYOPHILIZED, FOR SOLUTION INTRAVENOUS at 00:01

## 2024-06-01 RX ADMIN — APIXABAN 5 MG: 5 TABLET, FILM COATED ORAL at 17:45

## 2024-06-01 RX ADMIN — METOPROLOL TARTRATE 100 MG: 50 TABLET, FILM COATED ORAL at 08:42

## 2024-06-01 RX ADMIN — CEFTRIAXONE 1000 MG: 1 INJECTION, SOLUTION INTRAVENOUS at 11:51

## 2024-06-01 RX ADMIN — APIXABAN 5 MG: 5 TABLET, FILM COATED ORAL at 08:42

## 2024-06-01 RX ADMIN — ASPIRIN 81 MG: 81 TABLET, COATED ORAL at 08:42

## 2024-06-01 RX ADMIN — METOPROLOL TARTRATE 100 MG: 50 TABLET, FILM COATED ORAL at 21:19

## 2024-06-01 RX ADMIN — LOSARTAN POTASSIUM 25 MG: 25 TABLET, FILM COATED ORAL at 08:42

## 2024-06-01 RX ADMIN — ACETAMINOPHEN 650 MG: 325 TABLET, FILM COATED ORAL at 21:19

## 2024-06-01 RX ADMIN — AMOXICILLIN AND CLAVULANATE POTASSIUM 1 TABLET: 875; 125 TABLET, COATED ORAL at 08:42

## 2024-06-01 RX ADMIN — ACETAMINOPHEN 650 MG: 325 TABLET, FILM COATED ORAL at 13:44

## 2024-06-01 RX ADMIN — PANTOPRAZOLE SODIUM 40 MG: 40 TABLET, DELAYED RELEASE ORAL at 06:19

## 2024-06-01 RX ADMIN — ACETAMINOPHEN 650 MG: 325 TABLET, FILM COATED ORAL at 06:19

## 2024-06-01 NOTE — ED PROVIDER NOTES
"History  Chief Complaint   Patient presents with    Abdominal Pain     Pt c/o LLQ pain radiating up to left arm, seen last night for RLQ. C/o SOB as well     Patient is a 79-year-old male with past medical history of hypertension arriving for evaluation of left-sided abdominal pain, left-sided chest pain.  Patient reports he was evaluated at this facility the other evening and diagnosed with colitis/diverticulitis, and started on antibiotics.  Patient reports that he is having chest pain on the left side of his chest \"under my nipple.\"  Patient states had one episode of vomiting this morning. Patient states chest pain started earlier today. Patient states when he was evaluated originally he had right lower quadrant pain. Patient states the pain has since moved to LLQ. Patient denies blood in urine or stool. Patient denies nausea/vomiting/diarrhea. Patient reports pain in left lower ribs when he breathes. Patient states prior history of DVT that was attributed to a leg injury three years ago. Patient on Eliquis for afib. Patient denies syncope, near syncope, diaphoresis.         Prior to Admission Medications   Prescriptions Last Dose Informant Patient Reported? Taking?   amoxicillin-clavulanate (AUGMENTIN) 875-125 mg per tablet   No No   Sig: Take 1 tablet by mouth every 12 (twelve) hours for 10 days   apixaban (ELIQUIS) 5 mg  Self No No   Sig: Take 1 tablet (5 mg total) by mouth 2 (two) times a day   aspirin (ECOTRIN LOW STRENGTH) 81 mg EC tablet  Self Yes No   Sig: Take 81 mg by mouth daily   dicyclomine (BENTYL) 20 mg tablet   No No   Sig: Take 1 tablet (20 mg total) by mouth every 6 (six) hours as needed (abd pain)   lisinopril (ZESTRIL) 5 mg tablet  Self No No   Sig: Take 1 tablet (5 mg total) by mouth daily   metoprolol tartrate 75 MG TABS  Self No No   Sig: Take 1 tablet (75 mg total) by mouth every 12 (twelve) hours   omeprazole (PriLOSEC) 20 mg delayed release capsule  Self Yes No   Sig: Take 20 mg by mouth " daily      Facility-Administered Medications: None       Past Medical History:   Diagnosis Date    Dysphagia     Hypertension        Past Surgical History:   Procedure Laterality Date    CHOLECYSTECTOMY LAPAROSCOPIC N/A 2/8/2021    Procedure: CHOLECYSTECTOMY LAPAROSCOPIC;  Surgeon: Wally Young MD;  Location:  MAIN OR;  Service: General    CORONARY STENT PLACEMENT         History reviewed. No pertinent family history.  I have reviewed and agree with the history as documented.    E-Cigarette/Vaping    E-Cigarette Use Never User      E-Cigarette/Vaping Substances    Nicotine No     THC No     CBD No     Flavoring No     Other No     Unknown No      Social History     Tobacco Use    Smoking status: Never    Smokeless tobacco: Never   Vaping Use    Vaping status: Never Used   Substance Use Topics    Alcohol use: Yes     Alcohol/week: 1.0 standard drink of alcohol     Types: 1 Glasses of wine per week     Comment: daily    Drug use: Never       Review of Systems   Constitutional: Negative.    Eyes: Negative.    Respiratory:  Positive for shortness of breath.    Cardiovascular:  Positive for chest pain.   Gastrointestinal:  Positive for abdominal pain, nausea and vomiting.   Endocrine: Negative.    Genitourinary: Negative.    Musculoskeletal: Negative.    Allergic/Immunologic: Negative.    Neurological: Negative.    Hematological: Negative.    Psychiatric/Behavioral: Negative.     All other systems reviewed and are negative.      Physical Exam  Physical Exam  Vitals and nursing note reviewed.   Constitutional:       Appearance: He is well-developed and normal weight.   HENT:      Head: Normocephalic.      Mouth/Throat:      Mouth: Mucous membranes are moist.   Eyes:      Extraocular Movements: Extraocular movements intact.      Pupils: Pupils are equal, round, and reactive to light.   Cardiovascular:      Rate and Rhythm: Normal rate and regular rhythm.      Heart sounds: Normal heart sounds.   Pulmonary:      Effort:  Pulmonary effort is normal.      Breath sounds: Normal breath sounds.   Abdominal:      General: Abdomen is flat. Bowel sounds are normal.      Palpations: Abdomen is soft.      Tenderness: There is abdominal tenderness in the left upper quadrant and left lower quadrant.      Hernia: No hernia is present.   Skin:     General: Skin is warm.      Capillary Refill: Capillary refill takes less than 2 seconds.   Neurological:      General: No focal deficit present.      Mental Status: He is alert and oriented to person, place, and time.   Psychiatric:         Mood and Affect: Mood normal.         Behavior: Behavior normal.         Vital Signs  ED Triage Vitals   Temperature Pulse Respirations Blood Pressure SpO2   05/31/24 2015 05/31/24 2014 05/31/24 2014 05/31/24 2014 05/31/24 2014   97.8 °F (36.6 °C) 98 18 156/73 96 %      Temp Source Heart Rate Source Patient Position - Orthostatic VS BP Location FiO2 (%)   05/31/24 2015 05/31/24 2014 05/31/24 2014 05/31/24 2014 --   Temporal Monitor Sitting Right arm       Pain Score       05/31/24 2014       9           Vitals:    05/31/24 2112 05/31/24 2230 05/31/24 2234 05/31/24 2335   BP: 123/67 123/66 131/67 127/63   Pulse: 89 99 101 102   Patient Position - Orthostatic VS:  Lying           Visual Acuity  Visual Acuity      Flowsheet Row Most Recent Value   L Pupil Size (mm) 3   R Pupil Size (mm) 3            ED Medications  Medications   sodium chloride 0.9 % bolus 250 mL (250 mL Intravenous New Bag 5/31/24 2332)   piperacillin-tazobactam (ZOSYN) IVPB 4.5 g (has no administration in time range)   iohexol (OMNIPAQUE) 350 MG/ML injection (MULTI-DOSE) 100 mL (100 mL Intravenous Given 5/31/24 2317)   iohexol (OMNIPAQUE) 240 MG/ML solution 50 mL (50 mL Oral Given 5/31/24 2317)       Diagnostic Studies  Results Reviewed       Procedure Component Value Units Date/Time    HS Troponin I 2hr [[id number]]  (Normal) Collected: 05/31/24 2231    Lab Status: Final result Specimen: Blood from  Arm, Left Updated: 05/31/24 2303     hs TnI 2hr 11 ng/L      Delta 2hr hsTnI 0 ng/L     Urine Microscopic [[id number]]  (Abnormal) Collected: 05/31/24 2245    Lab Status: Final result Specimen: Urine, Other Updated: 05/31/24 2301     RBC, UA 10-20 /hpf      WBC, UA 1-2 /hpf      Epithelial Cells Occasional /hpf      Bacteria, UA None Seen /hpf     UA w Reflex to Microscopic w Reflex to Culture [[id number]]  (Abnormal) Collected: 05/31/24 2245    Lab Status: Final result Specimen: Urine, Other Updated: 05/31/24 2301     Color, UA Yellow     Clarity, UA Clear     Specific Gravity, UA 1.010     pH, UA 5.5     Leukocytes, UA Negative     Nitrite, UA Negative     Protein, UA Trace mg/dl      Glucose, UA Negative mg/dl      Ketones, UA Negative mg/dl      Urobilinogen, UA <2.0 mg/dl      Bilirubin, UA Negative     Occult Blood, UA Large    Blood culture #1 [[id number]] Collected: 05/31/24 2231    Lab Status: In process Specimen: Blood from Arm, Left Updated: 05/31/24 2240    HS Troponin I 4hr [[id number]]     Lab Status: No result Specimen: Blood     Procalcitonin [[id number]]  (Abnormal) Collected: 05/31/24 2028    Lab Status: Final result Specimen: Blood from Arm, Right Updated: 05/31/24 2127     Procalcitonin 0.33 ng/ml     HS Troponin 0hr (reflex protocol) [[id number]]  (Normal) Collected: 05/31/24 2028    Lab Status: Final result Specimen: Blood from Arm, Right Updated: 05/31/24 2058     hs TnI 0hr 11 ng/L     Lactic acid [[id number]]  (Normal) Collected: 05/31/24 2028    Lab Status: Final result Specimen: Blood from Arm, Right Updated: 05/31/24 2054     LACTIC ACID 1.0 mmol/L     Narrative:      Result may be elevated if tourniquet was used during collection.    Comprehensive metabolic panel [[id number]]  (Abnormal) Collected: 05/31/24 2028    Lab Status: Final result Specimen: Blood from Arm, Right Updated: 05/31/24 2050     Sodium 132 mmol/L      Potassium 3.8 mmol/L      Chloride 101 mmol/L      CO2 23 mmol/L      ANION  GAP 8 mmol/L      BUN 21 mg/dL      Creatinine 1.28 mg/dL      Glucose 145 mg/dL      Calcium 8.9 mg/dL      AST 17 U/L      ALT 13 U/L      Alkaline Phosphatase 57 U/L      Total Protein 7.1 g/dL      Albumin 3.7 g/dL      Total Bilirubin 1.12 mg/dL      eGFR 52 ml/min/1.73sq m     Narrative:      [employer] guidelines for Chronic Kidney Disease (CKD):     Stage 1 with normal or high GFR (GFR > 90 mL/min/1.73 square meters)    Stage 2 Mild CKD (GFR = 60-89 mL/min/1.73 square meters)    Stage 3A Moderate CKD (GFR = 45-59 mL/min/1.73 square meters)    Stage 3B Moderate CKD (GFR = 30-44 mL/min/1.73 square meters)    Stage 4 Severe CKD (GFR = 15-29 mL/min/1.73 square meters)    Stage 5 End Stage CKD (GFR <15 mL/min/1.73 square meters)  Note: GFR calculation is accurate only with a steady state creatinine    Magnesium [[id number]]  (Normal) Collected: 05/31/24 2028    Lab Status: Final result Specimen: Blood from Arm, Right Updated: 05/31/24 2050     Magnesium 2.0 mg/dL     Protime-INR [[id number]]  (Abnormal) Collected: 05/31/24 2028    Lab Status: Final result Specimen: Blood from Arm, Right Updated: 05/31/24 2050     Protime 18.0 seconds      INR 1.44    APTT [[id number]]  (Abnormal) Collected: 05/31/24 2028    Lab Status: Final result Specimen: Blood from Arm, Right Updated: 05/31/24 2050     PTT 49 seconds     CBC and differential [[id number]]  (Abnormal) Collected: 05/31/24 2028    Lab Status: Final result Specimen: Blood from Arm, Right Updated: 05/31/24 2036     WBC 8.59 Thousand/uL      RBC 3.99 Million/uL      Hemoglobin 12.9 g/dL      Hematocrit 39.6 %      MCV 99 fL      MCH 32.3 pg      MCHC 32.6 g/dL      RDW 12.3 %      MPV 10.2 fL      Platelets 210 Thousands/uL      nRBC 0 /100 WBCs      Segmented % 61 %      Immature Grans % 1 %      Lymphocytes % 26 %      Monocytes % 9 %      Eosinophils Relative 3 %      Basophils Relative 0 %      Absolute Neutrophils 5.27 Thousands/µL       Absolute Immature Grans 0.04 Thousand/uL      Absolute Lymphocytes 2.21 Thousands/µL      Absolute Monocytes 0.81 Thousand/µL      Eosinophils Absolute 0.25 Thousand/µL      Basophils Absolute 0.01 Thousands/µL     Blood culture #2 [[id number]] Collected: 05/31/24 2028    Lab Status: In process Specimen: Blood from Arm, Right Updated: 05/31/24 2033                   XR chest portable    (Results Pending)   CTA chest (pe study) abdomen pelvis contrast    (Results Pending)              Procedures  ECG 12 Lead Documentation Only    Date/Time: 5/31/2024 8:17 PM    Performed by: OLAF Ramirez  Authorized by: OLAF Ramirez    Indications / Diagnosis:  Chest pain  Patient location:  ED  Previous ECG:     Previous ECG:  Compared to current    Similarity:  Changes noted  Interpretation:     Interpretation: normal    Rate:     ECG rate:  98    ECG rate assessment: normal    Rhythm:     Rhythm: sinus rhythm    Ectopy:     Ectopy: none    QRS:     QRS axis:  Normal  Conduction:     Conduction: abnormal      Abnormal conduction: complete RBBB    ST segments:     ST segments:  Normal  T waves:     T waves: normal             ED Course  ED Course as of 05/31/24 2351   Fri May 31, 2024   2041 CBC and differential(!)             HEART Risk Score      Flowsheet Row Most Recent Value   Heart Score Risk Calculator    History 2 Filed at: 05/31/2024 2321   ECG 1 Filed at: 05/31/2024 2321   Age 2 Filed at: 05/31/2024 2321   Risk Factors 2 Filed at: 05/31/2024 2321   Troponin 0 Filed at: 05/31/2024 2321   HEART Score 7 Filed at: 05/31/2024 2321                              Wells' Criteria for PE      Flowsheet Row Most Recent Value   Wells' Criteria for PE    Clinical signs and symptoms of DVT 0 Filed at: 05/31/2024 2024   PE is primary diagnosis or equally likely 3 Filed at: 05/31/2024 2024   HR >100 1.5 Filed at: 05/31/2024 2024   Immobilization at least 3 days or Surgery in the previous 4 weeks 0 Filed at: 05/31/2024  2024   Previous, objectively diagnosed PE or DVT 1.5 Filed at: 05/31/2024 2024   Hemoptysis 0 Filed at: 05/31/2024 2024   Malignancy with treatment within 6 months or palliative 0 Filed at: 05/31/2024 2024   Wells' Criteria Total 6 Filed at: 05/31/2024 2024                  Medical Decision Making  DDx: PE, ACS,   Patient presented to the emergency room for now left lower quadrant pain that radiates into his left upper quadrant, left lower chest.  Patient reports that he is also having left-sided chest pain, and hurts when he breathes in his left ribs.  Patient states he does have a history of DVT from 3 years ago that was believed to be provoked by injury.  Patient is currently on Eliquis.  Patient was evaluated 2 nights ago and discharged with diagnosis of colitis/diverticulitis.  Patient states that he has been taking his antibiotics as directed, but did have an episode of vomiting this morning.  Per Otilio patient is high risk and given his tachycardia, and description of pleuritic pain, PE scan was ordered.  Out of concern for abscess, CT abdomen pelvis was ordered as well.  Septic labs were ordered as patient is mildly tachycardic at 101.  Patient has had no fever.  Patient endorses that pain started this morning in his chest.  NANCY from 10/2023 shows most recent EF of 40%.  EKG from 5/29 was reviewed and compared to tonight, with improvement. Per ED provider note EKG from 5/29 was discussed with cardiology and felt no STEMI was present.    Blood work: Comparatively patient has improved leukocytosis from 2 nights ago. Increase in creatinine from 5/29 blood work. Judicious fluids provided. No gross electrolyte abnormality. Lactic WNL. Elevated procal noted. Dose of abx given in known presence of colitis. No evidence of UTI on UA.   Troponin tonight of 11 at 0H and 2H. Heart score of 7 calculated.   Case signed out to Dr. Tamayo while awaiting CT results. Discussed dispo for admission for chest pain rule out, and  worsening abdominal pain pending CT scan results.   Offered pain medication throughout evaluation and patient declines.     Amount and/or Complexity of Data Reviewed  Labs: ordered. Decision-making details documented in ED Course.  Radiology: ordered.             Disposition  Final diagnoses:   None     ED Disposition       None          Follow-up Information    None         Patient's Medications   Discharge Prescriptions    No medications on file       No discharge procedures on file.    PDMP Review         Value Time User    PDMP Reviewed  Yes 10/14/2023  9:37 AM Philippe Nunez MD            ED Provider  Electronically Signed by             OLAF Ramirez  05/31/24 1275

## 2024-06-01 NOTE — ASSESSMENT & PLAN NOTE
Endorses inferior left lateral rib pain extending to his left arm that onset 5/31, patient reports that he did have an episode of vomiting with significant retching that lasted 3 to 4 minutes earlier in the day on 5/31  Pain worse with deep breaths, cough, and movement  When seen in the ED on 5/29, EKG was read by machine as STEMI due to mild ST elevation in lead III, however this was discussed with cardiology and they felt that this was not a STEMI as patient was not having any chest pain at that time and it was isolated to 1-lead without any reciprocal changess  EKG on admit without any ST changes, confirmed on repeat  CTA PE study negative for acute PE  HS troponin: 11/11 complete once more for completeness  Check echo  Suspect that patient has costochondritis secondary to episode of vomiting based on clinical story, negative troponins, and normal EKG  Unable to utilize NSAIDs as patient is on Eliquis and ASA-therefore will place on high-dose Tylenol

## 2024-06-01 NOTE — PLAN OF CARE
Problem: PAIN - ADULT  Goal: Verbalizes/displays adequate comfort level or baseline comfort level  Description: Interventions:  - Encourage patient to monitor pain and request assistance  - Assess pain using appropriate pain scale  - Administer analgesics based on type and severity of pain and evaluate response  - Implement non-pharmacological measures as appropriate and evaluate response  - Consider cultural and social influences on pain and pain management  - Notify physician/advanced practitioner if interventions unsuccessful or patient reports new pain  Outcome: Progressing     Problem: INFECTION - ADULT  Goal: Absence or prevention of progression during hospitalization  Description: INTERVENTIONS:  - Assess and monitor for signs and symptoms of infection  - Monitor lab/diagnostic results  - Monitor all insertion sites, i.e. indwelling lines, tubes, and drains  - Monitor endotracheal if appropriate and nasal secretions for changes in amount and color  - [place] appropriate cooling/warming therapies per order  - Administer medications as ordered  - Instruct and encourage patient and family to use good hand hygiene technique  - Identify and instruct in appropriate isolation precautions for identified infection/condition  Outcome: Progressing  Goal: Absence of fever/infection during neutropenic period  Description: INTERVENTIONS:  - Monitor WBC    Outcome: Progressing     Problem: SAFETY ADULT  Goal: Patient will remain free of falls  Description: INTERVENTIONS:  - Educate patient/family on patient safety including physical limitations  - Instruct patient to call for assistance with activity   - Consult OT/PT to assist with strengthening/mobility   - Keep Call bell within reach  - Keep bed low and locked with side rails adjusted as appropriate  - Keep care items and personal belongings within reach  - Initiate and maintain comfort rounds  - Make Fall Risk Sign visible to staff  - Offer Toileting every x Hours,  in advance of need  - Initiate/Maintain xalarm  - Obtain necessary fall risk management equipment: xx  - Apply yellow socks and bracelet for high fall risk patients  - Consider moving patient to room near nurses station  Outcome: Progressing  Goal: Maintain or return to baseline ADL function  Description: INTERVENTIONS:  -  Assess patient's ability to carry out ADLs; assess patient's baseline for ADL function and identify physical deficits which impact ability to perform ADLs (bathing, care of mouth/teeth, toileting, grooming, dressing, etc.)  - Assess/evaluate cause of self-care deficits   - Assess range of motion  - Assess patient's mobility; develop plan if impaired  - Assess patient's need for assistive devices and provide as appropriate  - Encourage maximum independence but intervene and supervise when necessary  - Involve family in performance of ADLs  - Assess for home care needs following discharge   - Consider OT consult to assist with ADL evaluation and planning for discharge  - Provide patient education as appropriate  Outcome: Progressing  Goal: Maintains/Returns to pre admission functional level  Description: INTERVENTIONS:  - Perform AM-PAC 6 Click Basic Mobility/ Daily Activity assessment daily.  - Set and communicate daily mobility goal to care team and patient/family/caregiver.   - Collaborate with rehabilitation services on mobility goals if consulted  - Perform Range of Motion x times a day.  - Reposition patient every x hours.  - Dangle patient x times a day  - Stand patient x times a day  - Ambulate patient xx times a day  - Out of bed to chair x times a day   - Out of bed for meals xx times a day  - Out of bed for toileting  - Record patient progress and toleration of activity level   Outcome: Progressing     Problem: DISCHARGE PLANNING  Goal: Discharge to home or other facility with appropriate resources  Description: INTERVENTIONS:  - Identify barriers to discharge w/patient and caregiver  -  Arrange for needed discharge resources and transportation as appropriate  - Identify discharge learning needs (meds, wound care, etc.)  - Arrange for interpretive services to assist at discharge as needed  - Refer to Case Management Department for coordinating discharge planning if the patient needs post-hospital services based on physician/advanced practitioner order or complex needs related to functional status, cognitive ability, or social support system  Outcome: Progressing     Problem: Knowledge Deficit  Goal: Patient/family/caregiver demonstrates understanding of disease process, treatment plan, medications, and discharge instructions  Description: Complete learning assessment and assess knowledge base.  Interventions:  - Provide teaching at level of understanding  - Provide teaching via preferred learning methods  Outcome: Progressing     Problem: MOBILITY - ADULT  Goal: Maintain or return to baseline ADL function  Description: INTERVENTIONS:  -  Assess patient's ability to carry out ADLs; assess patient's baseline for ADL function and identify physical deficits which impact ability to perform ADLs (bathing, care of mouth/teeth, toileting, grooming, dressing, etc.)  - Assess/evaluate cause of self-care deficits   - Assess range of motion  - Assess patient's mobility; develop plan if impaired  - Assess patient's need for assistive devices and provide as appropriate  - Encourage maximum independence but intervene and supervise when necessary  - Involve family in performance of ADLs  - Assess for home care needs following discharge   - Consider OT consult to assist with ADL evaluation and planning for discharge  - Provide patient education as appropriate  Outcome: Progressing  Goal: Maintains/Returns to pre admission functional level  Description: INTERVENTIONS:  - Perform AM-PAC 6 Click Basic Mobility/ Daily Activity assessment daily.  - Set and communicate daily mobility goal to care team and  patient/family/caregiver.   - Collaborate with rehabilitation services on mobility goals if consulted  - Perform Range of Motion x times a day.  - Reposition patient every x hours.  - Dangle patient xx times a day  - Stand patient x times a day  - Ambulate patient x times a day  - Out of bed to chair x times a day   - Out of bed for meals x times a day  - Out of bed for toileting  - Record patient progress and toleration of activity level   Outcome: Progressing     Problem: Nutrition/Hydration-ADULT  Goal: Nutrient/Hydration intake appropriate for improving, restoring or maintaining nutritional needs  Description: Monitor and assess patient's nutrition/hydration status for malnutrition. Collaborate with interdisciplinary team and initiate plan and interventions as ordered.  Monitor patient's weight and dietary intake as ordered or per policy. Utilize nutrition screening tool and intervene as necessary. Determine patient's food preferences and provide high-protein, high-caloric foods as appropriate.     INTERVENTIONS:  - Monitor oral intake, urinary output, labs, and treatment plans  - Assess nutrition and hydration status and recommend course of action  - Evaluate amount of meals eaten  - Assist patient with eating if necessary   - Allow adequate time for meals  - Recommend/ encourage appropriate diets, oral nutritional supplements, and vitamin/mineral supplements  - Order, calculate, and assess calorie counts as needed  - Recommend, monitor, and adjust tube feedings and TPN/PPN based on assessed needs  - Assess need for intravenous fluids  - Provide specific nutrition/hydration education as appropriate  - Include patient/family/caregiver in decisions related to nutrition  Outcome: Progressing

## 2024-06-01 NOTE — H&P
"[facility]  H&P  Name: Leander Saab 79 y.o. male I MRN: [id number]  Unit/Bed#: -01 I Date of Admission: 5/31/2024   Date of Service: 6/1/2024 I Hospital Day: 0      Assessment & Plan   * Chest pain  Assessment & Plan  Endorses inferior left lateral rib pain extending to his left arm that onset 5/31, patient reports that he did have an episode of vomiting with significant retching that lasted 3 to 4 minutes earlier in the day on 5/31  Pain worse with deep breaths, cough, and movement  When seen in the ED on 5/29, EKG was read by machine as STEMI due to mild ST elevation in lead III, however this was discussed with cardiology and they felt that this was not a STEMI as patient was not having any chest pain at that time and it was isolated to 1-lead without any reciprocal changess  EKG on admit without any ST changes, confirmed on repeat  CTA PE study negative for acute PE  HS troponin: 11/11 complete once more for completeness  Check echo  Suspect that patient has costochondritis secondary to episode of vomiting based on clinical story, negative troponins, and normal EKG  Unable to utilize NSAIDs as patient is on Eliquis and ASA-therefore will place on high-dose Tylenol    Enteritis  Assessment & Plan  Developed RLQ abdominal pain 5/29-was seen in the ED and started on Augmentin  CT A/P: \"Improved appearance of bowel wall thickening at the hepatic flexure compared to prior recent study dated 5/29/2024. No bowel obstruction or focal bowel wall thickening. \"  Leukocytosis improving, therefore will continue Augmentin until completion    PAF (paroxysmal atrial fibrillation) (HCC)  Assessment & Plan  RC: Metoprolol 75 Mg twice daily  AC: Eliquis 5 Mg twice daily  Continue home medications    Hypertension  Assessment & Plan  Home regimen: Losartan 25 Mg daily, metoprolol 75 Mg every 12 hours  Continue    Chronic combined systolic and diastolic congestive heart failure (HCC)  Assessment & " "Plan  Wt Readings from Last 3 Encounters:   06/01/24 84 kg (185 lb 3.2 oz)   05/29/24 83.9 kg (185 lb)   10/14/23 84.7 kg (186 lb 12.8 oz)     Examines euvolemic on admission  Not maintained on diuretics outpatient   Last echo 10/2023: LVEF 40%. No significant valvular disease  Maintained on cardiac diet  I/O           VTE Pharmacologic Prophylaxis: VTE Score: 4 Moderate Risk (Score 3-4) - Pharmacological DVT Prophylaxis Ordered: apixaban (Eliquis).  Code Status: Level 3 - DNAR and DNI   Discussion with family: Patient declined call to .     Anticipated Length of Stay: Patient will be admitted on an observation basis with an anticipated length of stay of less than 2 midnights secondary to chest pain .    Chief Complaint: \"I had pain in the left side of ribs going to my arm\"    History of Present Illness:  Leander Saab is a 79 y.o. male with a PMH of Afib, HTN, and GERD who presents with L sided rib pain that onset yesterday evening.  Patient reports that the pain is on the left lateral aspect of his ribs and radiates up to his left arm.  Pain is worse with deep inspiration, cough, and movement.  He reports yesterday morning he had a 3 to 4-minute episode of retching with vomiting.  The pain onset a few hours afterwards.  Denies nausea or diaphoresis.  His abdominal pain has been improving since being on antibiotics.  No fevers or chills.    Review of Systems:  Review of Systems   Constitutional:  Negative for chills and fever.   Respiratory:  Negative for choking and shortness of breath.    Cardiovascular:  Positive for chest pain. Negative for leg swelling.   Gastrointestinal:  Positive for abdominal pain, diarrhea (loose stools), nausea and vomiting.   Genitourinary:  Negative for difficulty urinating, dysuria and hematuria.   Musculoskeletal:  Negative for gait problem.   Neurological:  Negative for weakness and numbness.   All other systems reviewed and are negative.      Past Medical and Surgical " History:   Past Medical History:   Diagnosis Date    Dysphagia     Hypertension        Past Surgical History:   Procedure Laterality Date    CHOLECYSTECTOMY LAPAROSCOPIC N/A 2/8/2021    Procedure: CHOLECYSTECTOMY LAPAROSCOPIC;  Surgeon: Wally Young MD;  Location:  MAIN OR;  Service: General    CORONARY STENT PLACEMENT         Meds/Allergies:  Prior to Admission medications    Medication Sig Start Date End Date Taking? Authorizing Provider   amoxicillin-clavulanate (AUGMENTIN) 875-125 mg per tablet Take 1 tablet by mouth every 12 (twelve) hours for 10 days 5/29/24 6/8/24 Yes Nereida Hanson DO   apixaban (ELIQUIS) 5 mg Take 1 tablet (5 mg total) by mouth 2 (two) times a day 2/11/21 6/1/24 Yes Chester Chen MD   aspirin (ECOTRIN LOW STRENGTH) 81 mg EC tablet Take 81 mg by mouth daily   Yes Historical Provider, MD   lisinopril (ZESTRIL) 5 mg tablet Take 1 tablet (5 mg total) by mouth daily 2/11/21 6/1/24 Yes Chester Chen MD   metoprolol tartrate 75 MG TABS Take 1 tablet (75 mg total) by mouth every 12 (twelve) hours 2/11/21 6/1/24 Yes Chester Chen MD   omeprazole (PriLOSEC) 20 mg delayed release capsule Take 20 mg by mouth daily   Yes Historical Provider, MD   dicyclomine (BENTYL) 20 mg tablet Take 1 tablet (20 mg total) by mouth every 6 (six) hours as needed (abd pain) 5/29/24   Nereida Hanson DO     I have reviewed home medications with patient personally.    Allergies:   Allergies   Allergen Reactions    Pollen Extract        Social History:  Marital Status:    Occupation: retired  Patient Pre-hospital Living Situation: Home, With other family member: significant other  Patient Pre-hospital Level of Mobility: walks  Patient Pre-hospital Diet Restrictions: none  Substance Use History:   Social History     Substance and Sexual Activity   Alcohol Use Yes    Alcohol/week: 1.0 standard drink of alcohol    Types: 1 Glasses of wine per week    Comment: daily     Social History     Tobacco  "Use   Smoking Status Never   Smokeless Tobacco Never     Social History     Substance and Sexual Activity   Drug Use Never       Family History:  History reviewed. No pertinent family history.    Physical Exam:     Vitals:   Blood Pressure: 131/63 (06/01/24 0145)  Pulse: 85 (06/01/24 0145)  Temperature: 98.4 °F (36.9 °C) (06/01/24 0145)  Temp Source: Temporal (05/31/24 2015)  Respirations: 21 (06/01/24 0100)  Height: 5' 8\" (172.7 cm) (06/01/24 0145)  Weight - Scale: 84 kg (185 lb 3.2 oz) (06/01/24 0145)  SpO2: 94 % (06/01/24 0145)    Physical Exam  Vitals and nursing note reviewed.   Constitutional:       General: He is not in acute distress.     Appearance: Normal appearance. He is not ill-appearing.      Comments: Conversational.    HENT:      Head: Normocephalic.      Nose: Nose normal.      Mouth/Throat:      Mouth: Mucous membranes are moist.   Eyes:      Conjunctiva/sclera: Conjunctivae normal.   Cardiovascular:      Rate and Rhythm: Normal rate and regular rhythm.      Pulses: Normal pulses.      Heart sounds: No murmur heard.  Pulmonary:      Effort: Pulmonary effort is normal. No respiratory distress.      Breath sounds: Normal breath sounds. No wheezing, rhonchi or rales.   Abdominal:      General: Abdomen is flat.      Palpations: Abdomen is soft.      Tenderness: There is no abdominal tenderness. There is no guarding or rebound.   Musculoskeletal:         General: Normal range of motion.      Cervical back: Normal range of motion.      Right lower leg: No edema.      Left lower leg: No edema.      Comments: No reproducible chest tenderness on the left (though pt states it was tender earlier in the evening)   Skin:     General: Skin is warm and dry.      Coloration: Skin is not pale.   Neurological:      General: No focal deficit present.      Mental Status: He is alert and oriented to person, place, and time.   Psychiatric:         Mood and Affect: Mood normal.         Thought Content: Thought content " normal.          Additional Data:     Lab Results:  Results from last 7 days   Lab Units 05/31/24 2028   WBC Thousand/uL 8.59   HEMOGLOBIN g/dL 12.9   HEMATOCRIT % 39.6   PLATELETS Thousands/uL 210   SEGS PCT % 61   LYMPHO PCT % 26   MONO PCT % 9   EOS PCT % 3     Results from last 7 days   Lab Units 05/31/24 2028   SODIUM mmol/L 132*   POTASSIUM mmol/L 3.8   CHLORIDE mmol/L 101   CO2 mmol/L 23   BUN mg/dL 21   CREATININE mg/dL 1.28   ANION GAP mmol/L 8   CALCIUM mg/dL 8.9   ALBUMIN g/dL 3.7   TOTAL BILIRUBIN mg/dL 1.12*   ALK PHOS U/L 57   ALT U/L 13   AST U/L 17   GLUCOSE RANDOM mg/dL 145*     Results from last 7 days   Lab Units 05/31/24 2028   INR  1.44*             Results from last 7 days   Lab Units 05/31/24 2028 05/29/24 2002   LACTIC ACID mmol/L 1.0 0.8   PROCALCITONIN ng/ml 0.33*  --        Lines/Drains:  Invasive Devices       Peripheral Intravenous Line  Duration             Peripheral IV 05/31/24 Right;Ventral (anterior) Forearm <1 day                        Imaging: Reviewed radiology reports from this admission including: chest CT scan and abdominal/pelvic CT  CTA chest (pe study) abdomen pelvis contrast   Final Result by Mathew Tinsley MD (06/01 0047)      No evidence for pulmonary embolus to the level of the segmental branches. Subsegmental branches not adequately assessed.      No consolidation or effusion. Background emphysema.      Somewhat increased left perinephric stranding/fluid, nonspecific, possibly in the setting of renal disease. No striated nephrogram to suggest pyelonephritis or hydronephrosis. Recommend clinical correlation to exclude infection.      Improved appearance of bowel wall thickening at the hepatic flexure compared to prior recent study dated 5/29/2024. No bowel obstruction or focal bowel wall thickening.      Additional stable chronic/nonemergent findings above.         Workstation performed: [id number]         XR chest portable    (Results Pending)       EKG and Other  Studies Reviewed on Admission:   EKG:  NSR with RBBB.    ** Please Note: This note has been constructed using a voice recognition system. **

## 2024-06-01 NOTE — ASSESSMENT & PLAN NOTE
"Developed RLQ abdominal pain 5/29-was seen in the ED and started on Augmentin  CT A/P: \"Improved appearance of bowel wall thickening at the hepatic flexure compared to prior recent study dated 5/29/2024. No bowel obstruction or focal bowel wall thickening. \"  Leukocytosis improving, therefore will continue Augmentin until completion  "

## 2024-06-01 NOTE — ASSESSMENT & PLAN NOTE
Wt Readings from Last 3 Encounters:   06/01/24 84 kg (185 lb 3.2 oz)   05/29/24 83.9 kg (185 lb)   10/14/23 84.7 kg (186 lb 12.8 oz)     Examines euvolemic on admission  Not maintained on diuretics outpatient   Last echo 10/2023: LVEF 40%. No significant valvular disease  Maintained on cardiac diet  I/O

## 2024-06-01 NOTE — PLAN OF CARE
Problem: PAIN - ADULT  Goal: Verbalizes/displays adequate comfort level or baseline comfort level  Description: Interventions:  - Encourage patient to monitor pain and request assistance  - Assess pain using appropriate pain scale  - Administer analgesics based on type and severity of pain and evaluate response  - Implement non-pharmacological measures as appropriate and evaluate response  - Consider cultural and social influences on pain and pain management  - Notify physician/advanced practitioner if interventions unsuccessful or patient reports new pain  Outcome: Progressing     Problem: INFECTION - ADULT  Goal: Absence or prevention of progression during hospitalization  Description: INTERVENTIONS:  - Assess and monitor for signs and symptoms of infection  - Monitor lab/diagnostic results  - Monitor all insertion sites, i.e. indwelling lines, tubes, and drains  - Monitor endotracheal if appropriate and nasal secretions for changes in amount and color  - [place] appropriate cooling/warming therapies per order  - Administer medications as ordered  - Instruct and encourage patient and family to use good hand hygiene technique  - Identify and instruct in appropriate isolation precautions for identified infection/condition  Outcome: Progressing     Problem: SAFETY ADULT  Goal: Patient will remain free of falls  Description: INTERVENTIONS:  - Educate patient/family on patient safety including physical limitations  - Instruct patient to call for assistance with activity   - Consult OT/PT to assist with strengthening/mobility   - Keep Call bell within reach  - Keep bed low and locked with side rails adjusted as appropriate  - Keep care items and personal belongings within reach  - Initiate and maintain comfort rounds  - Make Fall Risk Sign visible to staff  - Apply yellow socks and bracelet for high fall risk patients  - Consider moving patient to room near nurses station  Outcome: Progressing

## 2024-06-01 NOTE — PLAN OF CARE
Problem: PAIN - ADULT  Goal: Verbalizes/displays adequate comfort level or baseline comfort level  Description: Interventions:  - Encourage patient to monitor pain and request assistance  - Assess pain using appropriate pain scale  - Administer analgesics based on type and severity of pain and evaluate response  - Implement non-pharmacological measures as appropriate and evaluate response  - Consider cultural and social influences on pain and pain management  - Notify physician/advanced practitioner if interventions unsuccessful or patient reports new pain  6/1/2024 0207 by Yue Awan RN  Outcome: Progressing  6/1/2024 0205 by Yue Awan RN  Outcome: Progressing     Problem: INFECTION - ADULT  Goal: Absence or prevention of progression during hospitalization  Description: INTERVENTIONS:  - Assess and monitor for signs and symptoms of infection  - Monitor lab/diagnostic results  - Monitor all insertion sites, i.e. indwelling lines, tubes, and drains  - Monitor endotracheal if appropriate and nasal secretions for changes in amount and color  - [place] appropriate cooling/warming therapies per order  - Administer medications as ordered  - Instruct and encourage patient and family to use good hand hygiene technique  - Identify and instruct in appropriate isolation precautions for identified infection/condition  6/1/2024 0207 by Yue Awan RN  Outcome: Progressing  6/1/2024 0205 by Yue Awan RN  Outcome: Progressing     Problem: INFECTION - ADULT  Goal: Absence of fever/infection during neutropenic period  Description: INTERVENTIONS:  - Monitor WBC    6/1/2024 0207 by Yue Awan RN  Outcome: Progressing  6/1/2024 0205 by Yue Awan RN  Outcome: Progressing     Problem: SAFETY ADULT  Goal: Patient will remain free of falls  Description: INTERVENTIONS:  - Educate patient/family on patient safety including physical limitations  - Instruct patient to call for assistance with activity   -  Consult OT/PT to assist with strengthening/mobility   - Keep Call bell within reach  - Keep bed low and locked with side rails adjusted as appropriate  - Keep care items and personal belongings within reach  - Initiate and maintain comfort rounds  - Make Fall Risk Sign visible to staff  - Apply yellow socks and bracelet for high fall risk patients  - Consider moving patient to room near nurses station  6/1/2024 0207 by Yue Awan RN  Outcome: Progressing

## 2024-06-02 PROBLEM — R93.5 ABNORMAL CT OF THE ABDOMEN: Status: ACTIVE | Noted: 2024-06-01

## 2024-06-02 LAB
ANION GAP SERPL CALCULATED.3IONS-SCNC: 7 MMOL/L (ref 4–13)
BACTERIA UR CULT: NORMAL
BASOPHILS # BLD AUTO: 0.03 THOUSANDS/ÂΜL (ref 0–0.1)
BASOPHILS NFR BLD AUTO: 1 % (ref 0–1)
BUN SERPL-MCNC: 20 MG/DL (ref 5–25)
CALCIUM SERPL-MCNC: 8.7 MG/DL (ref 8.4–10.2)
CHLORIDE SERPL-SCNC: 105 MMOL/L (ref 96–108)
CO2 SERPL-SCNC: 22 MMOL/L (ref 21–32)
CREAT SERPL-MCNC: 1.07 MG/DL (ref 0.6–1.3)
EOSINOPHIL # BLD AUTO: 0.42 THOUSAND/ÂΜL (ref 0–0.61)
EOSINOPHIL NFR BLD AUTO: 7 % (ref 0–6)
ERYTHROCYTE [DISTWIDTH] IN BLOOD BY AUTOMATED COUNT: 12.1 % (ref 11.6–15.1)
GFR SERPL CREATININE-BSD FRML MDRD: 64 ML/MIN/1.73SQ M
GLUCOSE SERPL-MCNC: 96 MG/DL (ref 65–140)
HCT VFR BLD AUTO: 37.9 % (ref 36.5–49.3)
HGB BLD-MCNC: 12.3 G/DL (ref 12–17)
IMM GRANULOCYTES # BLD AUTO: 0.03 THOUSAND/UL (ref 0–0.2)
IMM GRANULOCYTES NFR BLD AUTO: 1 % (ref 0–2)
LYMPHOCYTES # BLD AUTO: 2.09 THOUSANDS/ÂΜL (ref 0.6–4.47)
LYMPHOCYTES NFR BLD AUTO: 32 % (ref 14–44)
MCH RBC QN AUTO: 32.1 PG (ref 26.8–34.3)
MCHC RBC AUTO-ENTMCNC: 32.5 G/DL (ref 31.4–37.4)
MCV RBC AUTO: 99 FL (ref 82–98)
MONOCYTES # BLD AUTO: 0.54 THOUSAND/ÂΜL (ref 0.17–1.22)
MONOCYTES NFR BLD AUTO: 8 % (ref 4–12)
NEUTROPHILS # BLD AUTO: 3.36 THOUSANDS/ÂΜL (ref 1.85–7.62)
NEUTS SEG NFR BLD AUTO: 51 % (ref 43–75)
NRBC BLD AUTO-RTO: 0 /100 WBCS
PLATELET # BLD AUTO: 237 THOUSANDS/UL (ref 149–390)
PMV BLD AUTO: 10 FL (ref 8.9–12.7)
POTASSIUM SERPL-SCNC: 4.3 MMOL/L (ref 3.5–5.3)
PROCALCITONIN SERPL-MCNC: 0.17 NG/ML
RBC # BLD AUTO: 3.83 MILLION/UL (ref 3.88–5.62)
SODIUM SERPL-SCNC: 134 MMOL/L (ref 135–147)
WBC # BLD AUTO: 6.47 THOUSAND/UL (ref 4.31–10.16)

## 2024-06-02 PROCEDURE — 80048 BASIC METABOLIC PNL TOTAL CA: CPT | Performed by: INTERNAL MEDICINE

## 2024-06-02 PROCEDURE — 99233 SBSQ HOSP IP/OBS HIGH 50: CPT | Performed by: INTERNAL MEDICINE

## 2024-06-02 PROCEDURE — 85025 COMPLETE CBC W/AUTO DIFF WBC: CPT | Performed by: INTERNAL MEDICINE

## 2024-06-02 PROCEDURE — 84145 PROCALCITONIN (PCT): CPT | Performed by: INTERNAL MEDICINE

## 2024-06-02 RX ADMIN — PANTOPRAZOLE SODIUM 40 MG: 40 TABLET, DELAYED RELEASE ORAL at 05:48

## 2024-06-02 RX ADMIN — METOPROLOL TARTRATE 100 MG: 50 TABLET, FILM COATED ORAL at 08:43

## 2024-06-02 RX ADMIN — ACETAMINOPHEN 650 MG: 325 TABLET, FILM COATED ORAL at 05:47

## 2024-06-02 RX ADMIN — LOSARTAN POTASSIUM 25 MG: 25 TABLET, FILM COATED ORAL at 08:43

## 2024-06-02 RX ADMIN — METOPROLOL TARTRATE 100 MG: 50 TABLET, FILM COATED ORAL at 21:38

## 2024-06-02 RX ADMIN — CEFTRIAXONE 1000 MG: 1 INJECTION, SOLUTION INTRAVENOUS at 11:46

## 2024-06-02 RX ADMIN — APIXABAN 5 MG: 5 TABLET, FILM COATED ORAL at 08:44

## 2024-06-02 RX ADMIN — ACETAMINOPHEN 650 MG: 325 TABLET, FILM COATED ORAL at 21:37

## 2024-06-02 RX ADMIN — APIXABAN 5 MG: 5 TABLET, FILM COATED ORAL at 17:37

## 2024-06-02 RX ADMIN — ASPIRIN 81 MG: 81 TABLET, COATED ORAL at 08:44

## 2024-06-02 RX ADMIN — ACETAMINOPHEN 650 MG: 325 TABLET, FILM COATED ORAL at 15:13

## 2024-06-02 NOTE — PLAN OF CARE
Problem: PAIN - ADULT  Goal: Verbalizes/displays adequate comfort level or baseline comfort level  Description: Interventions:  - Encourage patient to monitor pain and request assistance  - Assess pain using appropriate pain scale  - Administer analgesics based on type and severity of pain and evaluate response  - Implement non-pharmacological measures as appropriate and evaluate response  - Consider cultural and social influences on pain and pain management  - Notify physician/advanced practitioner if interventions unsuccessful or patient reports new pain  Outcome: Progressing     Problem: INFECTION - ADULT  Goal: Absence or prevention of progression during hospitalization  Description: INTERVENTIONS:  - Assess and monitor for signs and symptoms of infection  - Monitor lab/diagnostic results  - Monitor all insertion sites, i.e. indwelling lines, tubes, and drains  - Monitor endotracheal if appropriate and nasal secretions for changes in amount and color  - [place] appropriate cooling/warming therapies per order  - Administer medications as ordered  - Instruct and encourage patient and family to use good hand hygiene technique  - Identify and instruct in appropriate isolation precautions for identified infection/condition  Outcome: Progressing     Problem: INFECTION - ADULT  Goal: Absence of fever/infection during neutropenic period  Description: INTERVENTIONS:  - Monitor WBC    Outcome: Progressing     Problem: SAFETY ADULT  Goal: Patient will remain free of falls  Description: INTERVENTIONS:  - Educate patient/family on patient safety including physical limitations  - Instruct patient to call for assistance with activity   - Consult OT/PT to assist with strengthening/mobility   - Keep Call bell within reach  - Keep bed low and locked with side rails adjusted as appropriate  - Keep care items and personal belongings within reach  - Initiate and maintain comfort rounds  - Make Fall Risk Sign visible to staff  -  Apply yellow socks and bracelet for high fall risk patients  - Consider moving patient to room near nurses station  Outcome: Progressing

## 2024-06-02 NOTE — ASSESSMENT & PLAN NOTE
Endorses inferior left lateral rib pain extending to his left arm that onset 5/31  Pain worse with deep breaths, cough, and movement  When seen in the ED on 5/29, EKG was read by machine as STEMI due to mild ST elevation in lead III, however this was discussed with cardiology and they felt that this was not a STEMI as patient was not having any chest pain at that time and it was isolated to 1-lead without any reciprocal changess  EKG on admit without any ST changes, confirmed on repeat  CTA PE study negative for acute PE  Trops negative  Check echo  R/o pericarditis  Consult cardiology

## 2024-06-02 NOTE — PROGRESS NOTES
[facility]  Progress Note  Name: Leander Saab I  MRN: [id number]  Unit/Bed#: -01 I Date of Admission: 5/31/2024   Date of Service: 6/2/2024 I Hospital Day: 1    Assessment & Plan   Abnormal CT of the abdomen  Assessment & Plan  Enteritis Resolved  Abnormal CT: Left perinephric stranding/fluid nonspecific  Urine culture negative  Patient was on Augmentin prior to presentation likely urine culture-negative for that reason  Started on ceftriaxone    Chronic combined systolic and diastolic congestive heart failure (HCC)  Assessment & Plan  Wt Readings from Last 3 Encounters:   06/01/24 84 kg (185 lb 3.2 oz)   05/29/24 83.9 kg (185 lb)   10/14/23 84.7 kg (186 lb 12.8 oz)     Examines euvolemic on admission  Not maintained on diuretics outpatient   Last echo 10/2023: LVEF 40%. No significant valvular disease  Maintained on cardiac diet  I/O    Hypertension  Assessment & Plan  Home regimen: losartan  25 Mg daily, metoprolol 75 Mg every 12 hours  Continue    PAF (paroxysmal atrial fibrillation) ([facility])  Assessment & Plan  RC: Metoprolol 75 Mg twice daily  AC: Eliquis 5 Mg twice daily  Continue home medications    * Chest pain  Assessment & Plan  Endorses inferior left lateral rib pain extending to his left arm that onset 5/31  Pain worse with deep breaths, cough, and movement  When seen in the ED on 5/29, EKG was read by machine as STEMI due to mild ST elevation in lead III, however this was discussed with cardiology and they felt that this was not a STEMI as patient was not having any chest pain at that time and it was isolated to 1-lead without any reciprocal changess  EKG on admit without any ST changes, confirmed on repeat  CTA PE study negative for acute PE  Trops negative  Check echo  R/o pericarditis  Consult cardiology               VTE Pharmacologic Prophylaxis: VTE Score: 4 Moderate Risk (Score 3-4) - Pharmacological DVT Prophylaxis Ordered: apixaban (Eliquis).    Mobility:    Basic Mobility Inpatient Raw Score: 22  JH-HLM Goal: 7: Walk 25 feet or more  JH-HLM Achieved: 7: Walk 25 feet or more  JH-HLM Goal achieved. Continue to encourage appropriate mobility.    Patient Centered Rounds: I performed bedside rounds with nursing staff today.   Discussions with Specialists or Other Care Team Provider: nursing    Education and Discussions with Family / Patient:  patient. , wife updated yesterday regarding echo    Total Time Spent on Date of Encounter in care of patient: 55 mins. This time was spent on one or more of the following: performing physical exam; counseling and coordination of care; obtaining or reviewing history; documenting in the medical record; reviewing/ordering tests, medications or procedures; communicating with other healthcare professionals and discussing with patient's family/caregivers.    Current Length of Stay: 1 day(s)  Current Patient Status: Inpatient   Certification Statement: The patient will continue to require additional inpatient hospital stay due to echo, cards consult  Discharge Plan: Anticipate discharge tomorrow to home.    Code Status: Level 3 - DNAR and DNI    Subjective:   Complains of chest pain still with inspiration, no pain with changing positions    Objective:     Vitals:   Temp (24hrs), Av.7 °F (36.5 °C), Min:97.5 °F (36.4 °C), Max:97.9 °F (36.6 °C)    Temp:  [97.5 °F (36.4 °C)-97.9 °F (36.6 °C)] 97.5 °F (36.4 °C)  HR:  [68-78] 73  Resp:  [20] 20  BP: (115-149)/(60-77) 149/77  SpO2:  [94 %-96 %] 95 %  Body mass index is 28.16 kg/m².     Input and Output Summary (last 24 hours):     Intake/Output Summary (Last 24 hours) at 2024 1403  Last data filed at 2024 1701  Gross per 24 hour   Intake 180 ml   Output --   Net 180 ml       Physical Exam:   Physical Exam  HENT:      Head: Normocephalic and atraumatic.      Mouth/Throat:      Mouth: Mucous membranes are moist.      Pharynx: Oropharynx is clear.   Eyes:      General: No scleral icterus.         Right eye: No discharge.         Left eye: No discharge.      Conjunctiva/sclera: Conjunctivae normal.   Cardiovascular:      Rate and Rhythm: Normal rate.   Pulmonary:      Effort: Pulmonary effort is normal. No respiratory distress.   Abdominal:      General: Bowel sounds are normal.      Palpations: Abdomen is soft.   Skin:     General: Skin is warm and dry.      Capillary Refill: Capillary refill takes less than 2 seconds.   Neurological:      Mental Status: He is alert and oriented to person, place, and time.          Additional Data:     Labs:  Results from last 7 days   Lab Units 06/02/24  0605   WBC Thousand/uL 6.47   HEMOGLOBIN g/dL 12.3   HEMATOCRIT % 37.9   PLATELETS Thousands/uL 237   SEGS PCT % 51   LYMPHO PCT % 32   MONO PCT % 8   EOS PCT % 7*     Results from last 7 days   Lab Units 06/02/24  0605 06/01/24  0448   SODIUM mmol/L 134* 132*   POTASSIUM mmol/L 4.3 3.9   CHLORIDE mmol/L 105 102   CO2 mmol/L 22 21   BUN mg/dL 20 18   CREATININE mg/dL 1.07 1.19   ANION GAP mmol/L 7 9   CALCIUM mg/dL 8.7 8.1*   ALBUMIN g/dL  --  3.3*   TOTAL BILIRUBIN mg/dL  --  1.25*   ALK PHOS U/L  --  51   ALT U/L  --  11   AST U/L  --  15   GLUCOSE RANDOM mg/dL 96 107     Results from last 7 days   Lab Units 05/31/24 2028   INR  1.44*             Results from last 7 days   Lab Units 06/02/24 0605 05/31/24 2028 05/29/24 2002   LACTIC ACID mmol/L  --  1.0 0.8   PROCALCITONIN ng/ml 0.17 0.33*  --        Lines/Drains:  Invasive Devices       Peripheral Intravenous Line  Duration             Peripheral IV 05/31/24 Right;Ventral (anterior) Forearm 1 day                      Telemetry:  Telemetry Orders (From admission, onward)               24 Hour Telemetry Monitoring  Continuous x 24 Hours (Telem)        Question:  Reason for 24 Hour Telemetry  Answer:  PCI/EP study (including pacer and ICD implementation), Cardiac surgery, MI, abnormal cardiac cath, and chest pain- rule out MI                     Telemetry  Reviewed: Normal Sinus Rhythm  Indication for Continued Telemetry Use: Elfego/jody/endocarditis             Imaging: No pertinent imaging reviewed.    Recent Cultures (last 7 days):   Results from last 7 days   Lab Units 06/01/24  1057 05/31/24  2231 05/31/24 2028   BLOOD CULTURE   --  No Growth at 24 hrs. No Growth at 24 hrs.   URINE CULTURE  No Growth <1000 cfu/mL  --   --        Last 24 Hours Medication List:   Current Facility-Administered Medications   Medication Dose Route Frequency Provider Last Rate    acetaminophen  650 mg Oral Q8H AVELINA Tere Santana PA-C      aluminum-magnesium hydroxide-simethicone  30 mL Oral Q6H PRN TereAARON Alaniz-SHAYY      apixaban  5 mg Oral BID AARON Alvarez-SHAYY      aspirin  81 mg Oral Daily AARON Alvarez-SHAYY      cefTRIAXone  1,000 mg Intravenous Q24H Luz Willoughby MD 1,000 mg (06/02/24 1146)    dicyclomine  20 mg Oral Q6H PRN Tere Santana PA-C      losartan  25 mg Oral Daily AARON Alvarez-SHAYY      metoprolol tartrate  100 mg Oral Q12H AVELINA AARON Alvarez-SHAYY      pantoprazole  40 mg Oral Early Morning Tere Santana PA-C      senna  1 tablet Oral HS PRN TereAARON Alaniz-SHAYY      trimethobenzamide  200 mg Intramuscular Q12H PRN Tere Santana PA-C          Today, Patient Was Seen By: Luz Willoughby MD    **Please Note: This note may have been constructed using a voice recognition system.**

## 2024-06-02 NOTE — ASSESSMENT & PLAN NOTE
Enteritis Resolved  Abnormal CT: Left perinephric stranding/fluid nonspecific  Urine culture negative  Patient was on Augmentin prior to presentation likely urine culture-negative for that reason  Started on ceftriaxone

## 2024-06-02 NOTE — PLAN OF CARE
Problem: PAIN - ADULT  Goal: Verbalizes/displays adequate comfort level or baseline comfort level  Description: Interventions:  - Encourage patient to monitor pain and request assistance  - Assess pain using appropriate pain scale  - Administer analgesics based on type and severity of pain and evaluate response  - Implement non-pharmacological measures as appropriate and evaluate response  - Consider cultural and social influences on pain and pain management  - Notify physician/advanced practitioner if interventions unsuccessful or patient reports new pain  Outcome: Progressing     Problem: INFECTION - ADULT  Goal: Absence or prevention of progression during hospitalization  Description: INTERVENTIONS:  - Assess and monitor for signs and symptoms of infection  - Monitor lab/diagnostic results  - Monitor all insertion sites, i.e. indwelling lines, tubes, and drains  - Monitor endotracheal if appropriate and nasal secretions for changes in amount and color  - [place] appropriate cooling/warming therapies per order  - Administer medications as ordered  - Instruct and encourage patient and family to use good hand hygiene technique  - Identify and instruct in appropriate isolation precautions for identified infection/condition  Outcome: Progressing  Goal: Absence of fever/infection during neutropenic period  Description: INTERVENTIONS:  - Monitor WBC    Outcome: Progressing     Problem: SAFETY ADULT  Goal: Patient will remain free of falls  Description: INTERVENTIONS:  - Educate patient/family on patient safety including physical limitations  - Instruct patient to call for assistance with activity   - Consult OT/PT to assist with strengthening/mobility   - Keep Call bell within reach  - Keep bed low and locked with side rails adjusted as appropriate  - Keep care items and personal belongings within reach  - Initiate and maintain comfort rounds  - Make Fall Risk Sign visible to staff  - Offer Toileting every 3 Hours,  in advance of need  - Initiate/Maintain bed alarm  - Obtain necessary fall risk management equipment: n/a  - Apply yellow socks and bracelet for high fall risk patients  - Consider moving patient to room near nurses station  Outcome: Progressing  Goal: Maintain or return to baseline ADL function  Description: INTERVENTIONS:  -  Assess patient's ability to carry out ADLs; assess patient's baseline for ADL function and identify physical deficits which impact ability to perform ADLs (bathing, care of mouth/teeth, toileting, grooming, dressing, etc.)  - Assess/evaluate cause of self-care deficits   - Assess range of motion  - Assess patient's mobility; develop plan if impaired  - Assess patient's need for assistive devices and provide as appropriate  - Encourage maximum independence but intervene and supervise when necessary  - Involve family in performance of ADLs  - Assess for home care needs following discharge   - Consider OT consult to assist with ADL evaluation and planning for discharge  - Provide patient education as appropriate  Outcome: Progressing  Goal: Maintains/Returns to pre admission functional level  Description: INTERVENTIONS:  - Perform AM-PAC 6 Click Basic Mobility/ Daily Activity assessment daily.  - Set and communicate daily mobility goal to care team and patient/family/caregiver.   - Collaborate with rehabilitation services on mobility goals if consulted  - Perform Range of Motion 3 times a day.  - Reposition patient every 3 hours.  - Dangle patient 3 times a day  - Stand patient 3 times a day  - Ambulate patient 3 times a day  - Out of bed to chair 3 times a day   - Out of bed for meals 3 times a day  - Out of bed for toileting  - Record patient progress and toleration of activity level   Outcome: Progressing     Problem: DISCHARGE PLANNING  Goal: Discharge to home or other facility with appropriate resources  Description: INTERVENTIONS:  - Identify barriers to discharge w/patient and  caregiver  - Arrange for needed discharge resources and transportation as appropriate  - Identify discharge learning needs (meds, wound care, etc.)  - Arrange for interpretive services to assist at discharge as needed  - Refer to Case Management Department for coordinating discharge planning if the patient needs post-hospital services based on physician/advanced practitioner order or complex needs related to functional status, cognitive ability, or social support system  Outcome: Progressing     Problem: Knowledge Deficit  Goal: Patient/family/caregiver demonstrates understanding of disease process, treatment plan, medications, and discharge instructions  Description: Complete learning assessment and assess knowledge base.  Interventions:  - Provide teaching at level of understanding  - Provide teaching via preferred learning methods  Outcome: Progressing     Problem: MOBILITY - ADULT  Goal: Maintain or return to baseline ADL function  Description: INTERVENTIONS:  -  Assess patient's ability to carry out ADLs; assess patient's baseline for ADL function and identify physical deficits which impact ability to perform ADLs (bathing, care of mouth/teeth, toileting, grooming, dressing, etc.)  - Assess/evaluate cause of self-care deficits   - Assess range of motion  - Assess patient's mobility; develop plan if impaired  - Assess patient's need for assistive devices and provide as appropriate  - Encourage maximum independence but intervene and supervise when necessary  - Involve family in performance of ADLs  - Assess for home care needs following discharge   - Consider OT consult to assist with ADL evaluation and planning for discharge  - Provide patient education as appropriate  Outcome: Progressing  Goal: Maintains/Returns to pre admission functional level  Description: INTERVENTIONS:  - Perform AM-PAC 6 Click Basic Mobility/ Daily Activity assessment daily.  - Set and communicate daily mobility goal to care team and  patient/family/caregiver.   - Collaborate with rehabilitation services on mobility goals if consulted  - Perform Range of Motion 3 times a day.  - Reposition patient every 3 hours.  - Dangle patient 3 times a day  - Stand patient 3 times a day  - Ambulate patient 3 times a day  - Out of bed to chair 3 times a day   - Out of bed for meals 3 times a day  - Out of bed for toileting  - Record patient progress and toleration of activity level   Outcome: Progressing     Problem: Nutrition/Hydration-ADULT  Goal: Nutrient/Hydration intake appropriate for improving, restoring or maintaining nutritional needs  Description: Monitor and assess patient's nutrition/hydration status for malnutrition. Collaborate with interdisciplinary team and initiate plan and interventions as ordered.  Monitor patient's weight and dietary intake as ordered or per policy. Utilize nutrition screening tool and intervene as necessary. Determine patient's food preferences and provide high-protein, high-caloric foods as appropriate.     INTERVENTIONS:  - Monitor oral intake, urinary output, labs, and treatment plans  - Assess nutrition and hydration status and recommend course of action  - Evaluate amount of meals eaten  - Assist patient with eating if necessary   - Allow adequate time for meals  - Recommend/ encourage appropriate diets, oral nutritional supplements, and vitamin/mineral supplements  - Order, calculate, and assess calorie counts as needed  - Recommend, monitor, and adjust tube feedings and TPN/PPN based on assessed needs  - Assess need for intravenous fluids  - Provide specific nutrition/hydration education as appropriate  - Include patient/family/caregiver in decisions related to nutrition  Outcome: Progressing

## 2024-06-03 ENCOUNTER — APPOINTMENT (INPATIENT)
Dept: NON INVASIVE DIAGNOSTICS | Facility: HOSPITAL | Age: 79
DRG: 392 | End: 2024-06-03
Payer: COMMERCIAL

## 2024-06-03 VITALS
OXYGEN SATURATION: 97 % | RESPIRATION RATE: 20 BRPM | HEIGHT: 68 IN | TEMPERATURE: 97.4 F | HEART RATE: 64 BPM | WEIGHT: 185.2 LBS | DIASTOLIC BLOOD PRESSURE: 76 MMHG | SYSTOLIC BLOOD PRESSURE: 137 MMHG | BODY MASS INDEX: 28.07 KG/M2

## 2024-06-03 LAB
ANION GAP SERPL CALCULATED.3IONS-SCNC: 8 MMOL/L (ref 4–13)
AORTIC ROOT: 3 CM
APICAL FOUR CHAMBER EJECTION FRACTION: 36 %
ASCENDING AORTA: 3.1 CM
ATRIAL RATE: 105 BPM
ATRIAL RATE: 98 BPM
BASOPHILS # BLD AUTO: 0.04 THOUSANDS/ÂΜL (ref 0–0.1)
BASOPHILS NFR BLD AUTO: 1 % (ref 0–1)
BSA FOR ECHO PROCEDURE: 1.98 M2
BUN SERPL-MCNC: 19 MG/DL (ref 5–25)
CALCIUM SERPL-MCNC: 9.3 MG/DL (ref 8.4–10.2)
CHLORIDE SERPL-SCNC: 104 MMOL/L (ref 96–108)
CO2 SERPL-SCNC: 25 MMOL/L (ref 21–32)
CREAT SERPL-MCNC: 1.13 MG/DL (ref 0.6–1.3)
DOP CALC LVOT AREA: 3.14 CM2
DOP CALC LVOT DIAMETER: 2 CM
E WAVE DECELERATION TIME: 271 MS
E/A RATIO: 0.77
EOSINOPHIL # BLD AUTO: 0.36 THOUSAND/ÂΜL (ref 0–0.61)
EOSINOPHIL NFR BLD AUTO: 5 % (ref 0–6)
ERYTHROCYTE [DISTWIDTH] IN BLOOD BY AUTOMATED COUNT: 12 % (ref 11.6–15.1)
FRACTIONAL SHORTENING: 28 (ref 28–44)
GFR SERPL CREATININE-BSD FRML MDRD: 60 ML/MIN/1.73SQ M
GLUCOSE SERPL-MCNC: 102 MG/DL (ref 65–140)
HCT VFR BLD AUTO: 40.7 % (ref 36.5–49.3)
HGB BLD-MCNC: 13.3 G/DL (ref 12–17)
IMM GRANULOCYTES # BLD AUTO: 0.02 THOUSAND/UL (ref 0–0.2)
IMM GRANULOCYTES NFR BLD AUTO: 0 % (ref 0–2)
INTERVENTRICULAR SEPTUM IN DIASTOLE (PARASTERNAL SHORT AXIS VIEW): 0.9 CM
INTERVENTRICULAR SEPTUM: 0.9 CM (ref 0.6–1.1)
LAAS-AP2: 26.5 CM2
LAAS-AP4: 24.4 CM2
LEFT ATRIUM AREA SYSTOLE SINGLE PLANE A4C: 22 CM2
LEFT ATRIUM SIZE: 4.4 CM
LEFT ATRIUM VOLUME (MOD BIPLANE): 88 ML
LEFT ATRIUM VOLUME INDEX (MOD BIPLANE): 44.4 ML/M2
LEFT INTERNAL DIMENSION IN SYSTOLE: 3.9 CM (ref 2.1–4)
LEFT VENTRICLE DIASTOLIC VOLUME (MOD BIPLANE): 145 ML
LEFT VENTRICLE DIASTOLIC VOLUME INDEX (MOD BIPLANE): 73.2 ML/M2
LEFT VENTRICLE SYSTOLIC VOLUME (MOD BIPLANE): 95 ML
LEFT VENTRICLE SYSTOLIC VOLUME INDEX (MOD BIPLANE): 48 ML/M2
LEFT VENTRICULAR INTERNAL DIMENSION IN DIASTOLE: 5.4 CM (ref 3.5–6)
LEFT VENTRICULAR POSTERIOR WALL IN END DIASTOLE: 1.1 CM
LEFT VENTRICULAR STROKE VOLUME: 74 ML
LV EF: 35 %
LVSV (TEICH): 74 ML
LYMPHOCYTES # BLD AUTO: 3.18 THOUSANDS/ÂΜL (ref 0.6–4.47)
LYMPHOCYTES NFR BLD AUTO: 47 % (ref 14–44)
MCH RBC QN AUTO: 32.4 PG (ref 26.8–34.3)
MCHC RBC AUTO-ENTMCNC: 32.7 G/DL (ref 31.4–37.4)
MCV RBC AUTO: 99 FL (ref 82–98)
MITRAL REGURGITATION PEAK VELOCITY: 5.28 M/S
MITRAL VALVE MEAN INFLOW VELOCITY: 4 M/S
MITRAL VALVE REGURGITANT PEAK GRADIENT: 112 MMHG
MONOCYTES # BLD AUTO: 0.43 THOUSAND/ÂΜL (ref 0.17–1.22)
MONOCYTES NFR BLD AUTO: 6 % (ref 4–12)
MV E'TISSUE VEL-SEP: 8 CM/S
MV PEAK A VEL: 0.78 M/S
MV PEAK E VEL: 60 CM/S
MV STENOSIS PRESSURE HALF TIME: 79 MS
MV VALVE AREA P 1/2 METHOD: 2.78
NEUTROPHILS # BLD AUTO: 2.78 THOUSANDS/ÂΜL (ref 1.85–7.62)
NEUTS SEG NFR BLD AUTO: 41 % (ref 43–75)
NRBC BLD AUTO-RTO: 0 /100 WBCS
P AXIS: 64 DEGREES
P AXIS: 75 DEGREES
PLATELET # BLD AUTO: 279 THOUSANDS/UL (ref 149–390)
PMV BLD AUTO: 10 FL (ref 8.9–12.7)
POTASSIUM SERPL-SCNC: 4.1 MMOL/L (ref 3.5–5.3)
PR INTERVAL: 176 MS
PR INTERVAL: 180 MS
QRS AXIS: 61 DEGREES
QRS AXIS: 86 DEGREES
QRSD INTERVAL: 154 MS
QRSD INTERVAL: 158 MS
QT INTERVAL: 392 MS
QT INTERVAL: 428 MS
QTC INTERVAL: 518 MS
QTC INTERVAL: 546 MS
RBC # BLD AUTO: 4.11 MILLION/UL (ref 3.88–5.62)
RIGHT ATRIUM AREA SYSTOLE A4C: 20.9 CM2
SL CV DOP CALC MV VTI RETROGRADE: 203.9 CM
SL CV LEFT ATRIUM LENGTH A2C: 5.6 CM
SL CV LV EF: 35
SL CV MV MEAN GRADIENT RETROGRADE: 72 MMHG
SL CV PED ECHO LEFT VENTRICLE DIASTOLIC VOLUME (MOD BIPLANE) 2D: 138 ML
SL CV PED ECHO LEFT VENTRICLE SYSTOLIC VOLUME (MOD BIPLANE) 2D: 64 ML
SODIUM SERPL-SCNC: 137 MMOL/L (ref 135–147)
T WAVE AXIS: 18 DEGREES
T WAVE AXIS: 40 DEGREES
TR MAX PG: 19 MMHG
TR PEAK VELOCITY: 2.2 M/S
TRICUSPID ANNULAR PLANE SYSTOLIC EXCURSION: 2.7 CM
TRICUSPID VALVE PEAK REGURGITATION VELOCITY: 2.21 M/S
VENTRICULAR RATE: 105 BPM
VENTRICULAR RATE: 98 BPM
WBC # BLD AUTO: 6.81 THOUSAND/UL (ref 4.31–10.16)

## 2024-06-03 PROCEDURE — 85025 COMPLETE CBC W/AUTO DIFF WBC: CPT | Performed by: INTERNAL MEDICINE

## 2024-06-03 PROCEDURE — 99223 1ST HOSP IP/OBS HIGH 75: CPT | Performed by: INTERNAL MEDICINE

## 2024-06-03 PROCEDURE — 99239 HOSP IP/OBS DSCHRG MGMT >30: CPT | Performed by: INTERNAL MEDICINE

## 2024-06-03 PROCEDURE — 93306 TTE W/DOPPLER COMPLETE: CPT | Performed by: INTERNAL MEDICINE

## 2024-06-03 PROCEDURE — 93010 ELECTROCARDIOGRAM REPORT: CPT | Performed by: INTERNAL MEDICINE

## 2024-06-03 PROCEDURE — C8929 TTE W OR WO FOL WCON,DOPPLER: HCPCS

## 2024-06-03 PROCEDURE — 80048 BASIC METABOLIC PNL TOTAL CA: CPT | Performed by: INTERNAL MEDICINE

## 2024-06-03 RX ADMIN — ASPIRIN 81 MG: 81 TABLET, COATED ORAL at 08:33

## 2024-06-03 RX ADMIN — APIXABAN 5 MG: 5 TABLET, FILM COATED ORAL at 08:34

## 2024-06-03 RX ADMIN — PANTOPRAZOLE SODIUM 40 MG: 40 TABLET, DELAYED RELEASE ORAL at 06:22

## 2024-06-03 RX ADMIN — ACETAMINOPHEN 650 MG: 325 TABLET, FILM COATED ORAL at 13:40

## 2024-06-03 RX ADMIN — LOSARTAN POTASSIUM 25 MG: 25 TABLET, FILM COATED ORAL at 08:33

## 2024-06-03 RX ADMIN — CEFTRIAXONE 1000 MG: 1 INJECTION, SOLUTION INTRAVENOUS at 10:51

## 2024-06-03 RX ADMIN — METOPROLOL TARTRATE 100 MG: 50 TABLET, FILM COATED ORAL at 08:33

## 2024-06-03 RX ADMIN — ACETAMINOPHEN 650 MG: 325 TABLET, FILM COATED ORAL at 06:21

## 2024-06-03 RX ADMIN — PERFLUTREN 0.2 ML/MIN: 6.52 INJECTION, SUSPENSION INTRAVENOUS at 09:41

## 2024-06-03 NOTE — PLAN OF CARE
Problem: PAIN - ADULT  Goal: Verbalizes/displays adequate comfort level or baseline comfort level  Description: Interventions:  - Encourage patient to monitor pain and request assistance  - Assess pain using appropriate pain scale  - Administer analgesics based on type and severity of pain and evaluate response  - Implement non-pharmacological measures as appropriate and evaluate response  - Consider cultural and social influences on pain and pain management  - Notify physician/advanced practitioner if interventions unsuccessful or patient reports new pain  Outcome: Progressing     Problem: INFECTION - ADULT  Goal: Absence or prevention of progression during hospitalization  Description: INTERVENTIONS:  - Assess and monitor for signs and symptoms of infection  - Monitor lab/diagnostic results  - Monitor all insertion sites, i.e. indwelling lines, tubes, and drains  - Monitor endotracheal if appropriate and nasal secretions for changes in amount and color  - [place] appropriate cooling/warming therapies per order  - Administer medications as ordered  - Instruct and encourage patient and family to use good hand hygiene technique  - Identify and instruct in appropriate isolation precautions for identified infection/condition  Outcome: Progressing     Problem: INFECTION - ADULT  Goal: Absence of fever/infection during neutropenic period  Description: INTERVENTIONS:  - Monitor WBC    Outcome: Progressing     Problem: SAFETY ADULT  Goal: Patient will remain free of falls  Description: INTERVENTIONS:  - Educate patient/family on patient safety including physical limitations  - Instruct patient to call for assistance with activity   - Consult OT/PT to assist with strengthening/mobility   - Keep Call bell within reach  - Keep bed low and locked with side rails adjusted as appropriate  - Keep care items and personal belongings within reach  - Initiate and maintain comfort rounds  - Make Fall Risk Sign visible to staff  -  Apply yellow socks and bracelet for high fall risk patients  - Consider moving patient to room near nurses station  Outcome: Progressing

## 2024-06-03 NOTE — PLAN OF CARE
Problem: PAIN - ADULT  Goal: Verbalizes/displays adequate comfort level or baseline comfort level  Description: Interventions:  - Encourage patient to monitor pain and request assistance  - Assess pain using appropriate pain scale  - Administer analgesics based on type and severity of pain and evaluate response  - Implement non-pharmacological measures as appropriate and evaluate response  - Consider cultural and social influences on pain and pain management  - Notify physician/advanced practitioner if interventions unsuccessful or patient reports new pain  Outcome: Progressing     Problem: INFECTION - ADULT  Goal: Absence or prevention of progression during hospitalization  Description: INTERVENTIONS:  - Assess and monitor for signs and symptoms of infection  - Monitor lab/diagnostic results  - Monitor all insertion sites, i.e. indwelling lines, tubes, and drains  - Monitor endotracheal if appropriate and nasal secretions for changes in amount and color  - [place] appropriate cooling/warming therapies per order  - Administer medications as ordered  - Instruct and encourage patient and family to use good hand hygiene technique  - Identify and instruct in appropriate isolation precautions for identified infection/condition  Outcome: Progressing  Goal: Absence of fever/infection during neutropenic period  Description: INTERVENTIONS:  - Monitor WBC    Outcome: Progressing     Problem: SAFETY ADULT  Goal: Patient will remain free of falls  Description: INTERVENTIONS:  - Educate patient/family on patient safety including physical limitations  - Instruct patient to call for assistance with activity   - Consult OT/PT to assist with strengthening/mobility   - Keep Call bell within reach  - Keep bed low and locked with side rails adjusted as appropriate  - Keep care items and personal belongings within reach  - Initiate and maintain comfort rounds  - Make Fall Risk Sign visible to staff  - Offer Toileting every x Hours,  in advance of need  - Initiate/Maintain xalarm  - Obtain necessary fall risk management equipment: xx  - Apply yellow socks and bracelet for high fall risk patients  - Consider moving patient to room near nurses station  Outcome: Progressing  Goal: Maintain or return to baseline ADL function  Description: INTERVENTIONS:  -  Assess patient's ability to carry out ADLs; assess patient's baseline for ADL function and identify physical deficits which impact ability to perform ADLs (bathing, care of mouth/teeth, toileting, grooming, dressing, etc.)  - Assess/evaluate cause of self-care deficits   - Assess range of motion  - Assess patient's mobility; develop plan if impaired  - Assess patient's need for assistive devices and provide as appropriate  - Encourage maximum independence but intervene and supervise when necessary  - Involve family in performance of ADLs  - Assess for home care needs following discharge   - Consider OT consult to assist with ADL evaluation and planning for discharge  - Provide patient education as appropriate  Outcome: Progressing  Goal: Maintains/Returns to pre admission functional level  Description: INTERVENTIONS:  - Perform AM-PAC 6 Click Basic Mobility/ Daily Activity assessment daily.  - Set and communicate daily mobility goal to care team and patient/family/caregiver.   - Collaborate with rehabilitation services on mobility goals if consulted  - Perform Range of Motion x times a day.  - Reposition patient every x hours.  - Dangle patient x times a day  - Stand patient x times a day  - Ambulate patient x times a day  - Out of bed to chair x times a day   - Out of bed for meals x times a day  - Out of bed for toileting  - Record patient progress and toleration of activity level   Outcome: Progressing     Problem: DISCHARGE PLANNING  Goal: Discharge to home or other facility with appropriate resources  Description: INTERVENTIONS:  - Identify barriers to discharge w/patient and caregiver  -  Arrange for needed discharge resources and transportation as appropriate  - Identify discharge learning needs (meds, wound care, etc.)  - Arrange for interpretive services to assist at discharge as needed  - Refer to Case Management Department for coordinating discharge planning if the patient needs post-hospital services based on physician/advanced practitioner order or complex needs related to functional status, cognitive ability, or social support system  Outcome: Progressing     Problem: Knowledge Deficit  Goal: Patient/family/caregiver demonstrates understanding of disease process, treatment plan, medications, and discharge instructions  Description: Complete learning assessment and assess knowledge base.  Interventions:  - Provide teaching at level of understanding  - Provide teaching via preferred learning methods  Outcome: Progressing     Problem: MOBILITY - ADULT  Goal: Maintain or return to baseline ADL function  Description: INTERVENTIONS:  -  Assess patient's ability to carry out ADLs; assess patient's baseline for ADL function and identify physical deficits which impact ability to perform ADLs (bathing, care of mouth/teeth, toileting, grooming, dressing, etc.)  - Assess/evaluate cause of self-care deficits   - Assess range of motion  - Assess patient's mobility; develop plan if impaired  - Assess patient's need for assistive devices and provide as appropriate  - Encourage maximum independence but intervene and supervise when necessary  - Involve family in performance of ADLs  - Assess for home care needs following discharge   - Consider OT consult to assist with ADL evaluation and planning for discharge  - Provide patient education as appropriate  Outcome: Progressing  Goal: Maintains/Returns to pre admission functional level  Description: INTERVENTIONS:  - Perform AM-PAC 6 Click Basic Mobility/ Daily Activity assessment daily.  - Set and communicate daily mobility goal to care team and  patient/family/caregiver.   - Collaborate with rehabilitation services on mobility goals if consulted  - Perform Range of Motion x times a day.  - Reposition patient every x hours.  - Dangle patient x times a day  - Stand patient x times a day  - Ambulate patient x times a day  - Out of bed to chair x times a day   - Out of bed for meals xx times a day  - Out of bed for toileting  - Record patient progress and toleration of activity level   Outcome: Progressing     Problem: Nutrition/Hydration-ADULT  Goal: Nutrient/Hydration intake appropriate for improving, restoring or maintaining nutritional needs  Description: Monitor and assess patient's nutrition/hydration status for malnutrition. Collaborate with interdisciplinary team and initiate plan and interventions as ordered.  Monitor patient's weight and dietary intake as ordered or per policy. Utilize nutrition screening tool and intervene as necessary. Determine patient's food preferences and provide high-protein, high-caloric foods as appropriate.     INTERVENTIONS:  - Monitor oral intake, urinary output, labs, and treatment plans  - Assess nutrition and hydration status and recommend course of action  - Evaluate amount of meals eaten  - Assist patient with eating if necessary   - Allow adequate time for meals  - Recommend/ encourage appropriate diets, oral nutritional supplements, and vitamin/mineral supplements  - Order, calculate, and assess calorie counts as needed  - Recommend, monitor, and adjust tube feedings and TPN/PPN based on assessed needs  - Assess need for intravenous fluids  - Provide specific nutrition/hydration education as appropriate  - Include patient/family/caregiver in decisions related to nutrition  Outcome: Progressing

## 2024-06-03 NOTE — CONSULTS
Consult - Cardiology   Leander Saab 79 y.o. male MRN: [id number]  Unit/Bed#: -01 Encounter: [id number]        Reason For Consult: Chest discomfort  [facility] Cardi[facility]               ASSESSMENT:  Chest discomfort  Suspect noncardiac and possibly related to his recent bout of enteritis or even with an element of costochondritis as the pain was reproducible with deep breathing and palpating left side of his chest yesterday  Troponin 8 --> 9 --> 12  EKG showing sinus rhythm with PVCs and underlying right bundle branch block, no ischemia  Recent history of gastroenteritis on 5/29/2024 for which he was treated with amoxicillin and Bentyl  Atrial fibrillation, paroxysmal  Prehospital AC: Eliquis 5 BID  Prehospital AV blocking rx: Metoprolol 100 twice daily  10/2023 NANCY/CV to NSR  CAD with prior stenting in 2018   This was performed at an outside hospital in [place]  Unknown vessels which were stented  No prior records are available  Hypertension  Unspecified cardiomyopathy  02/2021 echo: LVEF 40%  10/2023 NANCY: LVEF 40%, moderate global hypokinesis    PLAN/ DISCUSSION:     Chest discomfort --> echo performed this morning with review forthcoming.  If no major changes from prior study then he could likely [facility] for [facility] of repeat ischemic evaluation    A-fib --> sinus rhythm on arrival.  Continue Eliquis for stroke risk reduction and metoprolol tartrate 100 mg twice daily for rate control    Hypertension --> this is well-controlled on metoprolol and losartan, continue both    History of CAD --> this appears stable without any obvious symptoms of angina.  His left-sided rib and shoulder discomfort felt prior from prior angina.  Continue aspirin and statin therapy.  [facility] on [facility].    History Of Present Illness:  This is a very pleasant 79-year-old gentleman who [facility].  He has known history of CAD with prior  stenting in 2018.  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  He currently resides locally in [place].  [facility] [place] as it is a fairly reasonable drive and he enjoys the scenery.  He would prefer not to deal with the traffic of the VA in [place], [place], or [place].  At his baseline he is a healthy and active 79-year-old.  He does have some chronic shortness of breath from COPD but still enjoys playing golf.  He placed 2 or 3 days/week.  He plays 18 holes.  He does use a golf cart but does walking throughout the day around the golf greens.    Last week he was in the ER with abdominal pain.  He was diagnosed with gastroenteritis.  He was given Bentyl and amoxicillin discharge.  He says that over the weekend his abdominal pain went from the right side over to the left side of his abdomen.  It began to radiate up the left side of his chest and into his left shoulder.  This persisted for about 1 day.  His wife is a retired nurse who urged him to come to the ER for evaluation.  He says that during the day his pain would be worse when he took a deep breath or if he would press on the left side of his rib cage.  The symptoms felt different from his prior angina.  He was admitted from the ER for observation and we are asked see in consultation for chest discomfort.  This morning he is pain-free and comfortable.    Past Medical History:        Past Medical History:   Diagnosis Date    Dysphagia     Hypertension       Past Surgical History:   Procedure Laterality Date    CHOLECYSTECTOMY LAPAROSCOPIC N/A 2/8/2021    Procedure: CHOLECYSTECTOMY LAPAROSCOPIC;  Surgeon: Wally Young MD;  Location:  MAIN OR;  Service: General    CORONARY STENT PLACEMENT          Allergy:        Allergies   Allergen Reactions    Pollen Extract        Medications:       Prior to Admission medications    Medication Sig Start Date End Date Taking? Authorizing Provider    amoxicillin-clavulanate (AUGMENTIN) 875-125 mg per tablet Take 1 tablet by mouth every 12 (twelve) hours for 10 days 5/29/24 6/8/24 Yes Nereida Hanson DO   apixaban (ELIQUIS) 5 mg Take 1 tablet (5 mg total) by mouth 2 (two) times a day 2/11/21 6/1/24 Yes Chester Chen MD   aspirin (ECOTRIN LOW STRENGTH) 81 mg EC tablet Take 81 mg by mouth daily   Yes Historical Provider, MD   losartan (COZAAR) 25 mg tablet Take 25 mg by mouth daily 4/30/24  Yes Historical Provider, MD   metoprolol tartrate 75 MG TABS Take 1 tablet (75 mg total) by mouth every 12 (twelve) hours 2/11/21 6/1/24 Yes Chester Chen MD   omeprazole (PriLOSEC) 20 mg delayed release capsule Take 20 mg by mouth daily   Yes Historical Provider, MD   dicyclomine (BENTYL) 20 mg tablet Take 1 tablet (20 mg total) by mouth every 6 (six) hours as needed (abd pain) 5/29/24   Nereida Hanson DO       Family History:     History reviewed. No pertinent family history.     Social History:       Social History     Socioeconomic History    Marital status:      Spouse name: None    Number of children: None    Years of education: None    Highest education level: None   Occupational History    None   Tobacco Use    Smoking status: Never    Smokeless tobacco: Never   Vaping Use    Vaping status: Never Used   Substance and Sexual Activity    Alcohol use: Yes     Alcohol/week: 1.0 standard drink of alcohol     Types: 1 Glasses of wine per week     Comment: daily    Drug use: Never    Sexual activity: Not Currently   Other Topics Concern    None   Social History Narrative    None     Social Determinants of Health     Financial Resource Strain: Not on file   Food Insecurity: No Food Insecurity (10/13/2023)    Hunger Vital Sign     Worried About Running Out of Food in the Last Year: Never true     Ran Out of Food in the Last Year: Never true   Transportation Needs: No Transportation Needs (10/13/2023)    PRAPARE - Transportation     Lack of Transportation  (Medical): No     Lack of Transportation (Non-Medical): No   Physical Activity: Not on file   Stress: Not on file   Social Connections: Not on file   Intimate Partner Violence: Not on file   Housing Stability: Low Risk  (10/13/2023)    Housing Stability Vital Sign     Unable to Pay for Housing in the Last Year: No     Number of Places Lived in the Last Year: 1     Unstable Housing in the Last Year: No       ROS:  14 point ROS negative except as outlined above  Remainder review of systems is negative    Exam:  General:  alert, oriented and in no distress, cooperative  Head: Normocephalic, atraumatic.  Eyes:  EOMI. Pupils - equal, round, reactive to accomodation.  No icterus.  Normal Conjunctiva.   Oropharynx: moist and normal-appearing mucosa  Neck: supple, symmetrical, trachea midline and no JVD  Heart:  RRR, No: murmer, rub or gallop, S1 & S2 normal   Respiratory effort / Chest Inspection: unlabored  Lungs:  normal air entry, lungs clear to auscultation and no rales, rhonchi or wheezing   Abdomen: flat, normal findings: bowel sounds normal and soft, non-tender  Lower Limbs:  no pitting edema  Pulses::  RLE - DP: present 2+                 LLE - DP: present 2+  Musculoskeletal: ROM grossly normal        DATA:      ECG:                     Telemetry: Normal sinus rhythm, . HR 80's          Echocardiogram:           Ischemic Testing:         Weights:    Wt Readings from Last 3 Encounters:   06/01/24 84 kg (185 lb 3.2 oz)   05/29/24 83.9 kg (185 lb)   10/14/23 84.7 kg (186 lb 12.8 oz)   , Body mass index is 28.16 kg/m².         Lab Studies:             Results from last 7 days   Lab Units 06/03/24  0624 06/02/24 0605 06/01/24  0448   WBC Thousand/uL 6.81 6.47 8.06   HEMOGLOBIN g/dL 13.3 12.3 11.9*   HEMATOCRIT % 40.7 37.9 35.9*   PLATELETS Thousands/uL 279 237 210   ,   Results from last 7 days   Lab Units 06/03/24  0624 06/02/24  0605 06/01/24  0448 05/31/24 2028 05/29/24  1845   POTASSIUM mmol/L 4.1 4.3 3.9 3.8 3.9    CHLORIDE mmol/L 104 105 102 101 103   CO2 mmol/L 25 22 21 23 21   BUN mg/dL 19 20 18 21 27*   CREATININE mg/dL 1.13 1.07 1.19 1.28 1.14   CALCIUM mg/dL 9.3 8.7 8.1* 8.9 9.1   ALK PHOS U/L  --   --  51 57 57   ALT U/L  --   --  11 13 12   AST U/L  --   --  15 17 18

## 2024-06-04 PROBLEM — R07.9 CHEST PAIN: Status: RESOLVED | Noted: 2024-06-01 | Resolved: 2024-06-04

## 2024-06-04 NOTE — ASSESSMENT & PLAN NOTE
Enteritis Resolved  Abnormal CT: Left perinephric stranding/fluid nonspecific  Urine culture negative  Unlikely urine infection as patient's urine culture when he was first seen emergency department and during this admission was negative for infection.  Continue Augmentin to complete treatment for colitis

## 2024-06-04 NOTE — DISCHARGE SUMMARY
[facility]  Discharge- Leander Saab 1945, 79 y.o. male MRN: [id number]  Unit/Bed#: MS [facility]-01 Encounter: [id number]  Primary Care Provider: Ashley Gibson MD   Date and time admitted to hospital: 5/31/2024  8:09 PM    Abnormal CT of the abdomen  Assessment & Plan  Enteritis Resolved  Abnormal CT: Left perinephric stranding/fluid nonspecific  Urine culture negative  Unlikely urine infection as patient's urine culture when he was first seen emergency department and during this admission was negative for infection.  Continue Augmentin to complete treatment for colitis    Chronic combined systolic and diastolic congestive heart failure (HCC)  Assessment & Plan  Wt Readings from Last 3 Encounters:   06/01/24 84 kg (185 lb 3.2 oz)   05/29/24 83.9 kg (185 lb)   10/14/23 84.7 kg (186 lb 12.8 oz)     Examines euvolemic on admission  Not maintained on diuretics outpatient   Last echo 10/2023: LVEF 40%. No significant valvular disease  Repeat echo showed LVEF 35%  Follow-up with cardiology outpatient    Hypertension  Assessment & Plan  Home regimen: losartan  25 Mg daily, metoprolol 75 Mg every 12 hours  Continue    PAF (paroxysmal atrial fibrillation) ([facility])  Assessment & Plan  RC: Metoprolol 75 Mg twice daily  AC: Eliquis 5 Mg twice daily  Continue home medications    * Chest pain-resolved as of 6/4/2024  Assessment & Plan  Endorses inferior left lateral rib pain extending to his left arm that onset 5/31  Pain worse with deep breaths, cough, and movement  When seen in the ED on 5/29, EKG was read by machine as STEMI due to mild ST elevation in lead III, however this was discussed with cardiology and they felt that this was not a STEMI as patient was not having any chest pain at that time and it was isolated to 1-lead without any reciprocal changess  EKG on admit without any ST changes, confirmed on repeat  CTA PE study negative for acute PE  Trops negative  Cardiology was  consulted  Most likely musculoskeletal versus pain radiating from enterocolitis          Medical Problems       Resolved Problems  Date Reviewed: 10/14/2023            Resolved    * (Principal) Chest pain 6/4/2024     Resolved by  Kirsten Plummer MD        Discharging Physician / Practitioner: Kirsten Plummer MD  PCP: Ashley Gibson MD  Admission Date:   Admission Orders (From admission, onward)       Ordered        06/01/24 1505  INPATIENT ADMISSION  Once            06/01/24 0059  Place in Observation  Once                          Discharge Date: 06/03/24    Consultations During Hospital Stay:  cardiology    Procedures Performed:   none    Significant Findings / Test Results:   XR chest portable  Result Date: 6/1/2024  Impression: No acute cardiopulmonary disease. No acute disease with mild benign bibasilar atelectasis. Emphysema.     CTA chest (pe study) abdomen pelvis contrast  Result Date: 6/1/2024  Impression: No evidence for pulmonary embolus to the level of the segmental branches. Subsegmental branches not adequately assessed. No consolidation or effusion. Background emphysema. Somewhat increased left perinephric stranding/fluid, nonspecific, possibly in the setting of renal disease. No striated nephrogram to suggest pyelonephritis or hydronephrosis. Recommend clinical correlation to exclude infection. Improved appearance of bowel wall thickening at the hepatic flexure compared to prior recent study dated 5/29/2024. No bowel obstruction or focal bowel wall thickening. Additional stable chronic/nonemergent findings above.     Incidental Findings:   none     Test Results Pending at Discharge (will require follow up):   none     Outpatient Tests Requested:  none    Complications:  none    Reason for Admission: chest pain     Hospital Course:   Leander Saab is a 79 y.o. male patient who originally presented to the hospital on 5/31/2024 due to chest pain.  Patient was seen in the emergency  "department a few days ago and started on Augmentin for enterocolitis.  He came to emergency department with pain in the left lateral aspect of his ribs that radiated to his left arm, worse with deep inspiration, cough and movement.  EKG normal with sinus rhythm with a right bundle branch block, troponin negative.  Echo showed moderate reduced systolic fraction of 35%, not significantly changed compared to prior echo.  Pain most likely noncardiac.    Patient's chest pain resolved during hospitalization.  Stable for discharge.    Please see above list of diagnoses and related plan for additional information.     Condition at Discharge: good    Discharge Day Visit / Exam:   Subjective:  no complaint s  Vitals: Blood Pressure: 137/76 (06/03/24 1456)  Pulse: 64 (06/03/24 1456)  Temperature: (!) 97.4 °F (36.3 °C) (06/03/24 1456)  Temp Source: Temporal (05/31/24 2015)  Respirations: 20 (06/03/24 1456)  Height: 5' 8\" (172.7 cm) (06/01/24 0145)  Weight - Scale: 84 kg (185 lb 3.2 oz) (06/01/24 0145)  SpO2: 97 % (06/03/24 1456)  Exam:   Physical Exam  Vitals and nursing note reviewed.   Constitutional:       General: He is not in acute distress.     Appearance: He is not diaphoretic.   HENT:      Head: Normocephalic.   Eyes:      General: No scleral icterus.        Right eye: No discharge.         Left eye: No discharge.   Cardiovascular:      Rate and Rhythm: Normal rate and regular rhythm.   Pulmonary:      Effort: Pulmonary effort is normal. No respiratory distress.      Breath sounds: Normal breath sounds. No wheezing, rhonchi or rales.   Abdominal:      General: There is no distension.      Palpations: Abdomen is soft.      Tenderness: There is no abdominal tenderness. There is no guarding or rebound.   Musculoskeletal:      Cervical back: Normal range of motion.      Right lower leg: No edema.      Left lower leg: No edema.   Skin:     General: Skin is warm.   Neurological:      Mental Status: He is alert and oriented to " person, place, and time.   Psychiatric:         Mood and Affect: Mood normal.         Behavior: Behavior normal.         Thought Content: Thought content normal.         Judgment: Judgment normal.          Discussion with Family: Updated  (wife) at bedside.    Discharge instructions/Information to patient and family:   See after visit summary for information provided to patient and family.      Provisions for Follow-Up Care:  See after visit summary for information related to follow-up care and any pertinent home health orders.      Mobility at time of Discharge:   Basic Mobility Inpatient Raw Score: 22  -HLM Goal: 7: Walk 25 feet or more  JH-HLM Achieved: 3: Sit at edge of bed  HLM Goal achieved. Continue to encourage appropriate mobility.     Disposition:   Home    Planned Readmission: no     Discharge Statement:  I spent 37 minutes discharging the patient. This time was spent on the day of discharge. I had direct contact with the patient on the day of discharge. Greater than 50% of the total time was spent examining patient, answering all patient questions, arranging and discussing plan of care with patient as well as directly providing post-discharge instructions.  Additional time then spent on discharge activities.    Discharge Medications:  See after visit summary for reconciled discharge medications provided to patient and/or family.      **Please Note: This note may have been constructed using a voice recognition system**

## 2024-06-04 NOTE — ASSESSMENT & PLAN NOTE
Endorses inferior left lateral rib pain extending to his left arm that onset 5/31  Pain worse with deep breaths, cough, and movement  When seen in the ED on 5/29, EKG was read by machine as STEMI due to mild ST elevation in lead III, however this was discussed with cardiology and they felt that this was not a STEMI as patient was not having any chest pain at that time and it was isolated to 1-lead without any reciprocal changess  EKG on admit without any ST changes, confirmed on repeat  CTA PE study negative for acute PE  Trops negative  Cardiology was consulted  Most likely musculoskeletal versus pain radiating from enterocolitis

## 2024-06-04 NOTE — ASSESSMENT & PLAN NOTE
Wt Readings from Last 3 Encounters:   06/01/24 84 kg (185 lb 3.2 oz)   05/29/24 83.9 kg (185 lb)   10/14/23 84.7 kg (186 lb 12.8 oz)     Examines euvolemic on admission  Not maintained on diuretics outpatient   Last echo 10/2023: LVEF 40%. No significant valvular disease  Repeat echo showed LVEF 35%  Follow-up with cardiology outpatient

## 2024-06-06 LAB
BACTERIA BLD CULT: NORMAL
BACTERIA BLD CULT: NORMAL

## 2024-12-13 ENCOUNTER — HOSPITAL ENCOUNTER (INPATIENT)
Facility: HOSPITAL | Age: 79
LOS: 3 days | Discharge: HOME | DRG: 871 | End: 2024-12-16
Attending: EMERGENCY MEDICINE | Admitting: STUDENT IN AN ORGANIZED HEALTH CARE EDUCATION/TRAINING PROGRAM
Payer: COMMERCIAL

## 2024-12-13 ENCOUNTER — APPOINTMENT (OUTPATIENT)
Dept: RADIOLOGY | Facility: HOSPITAL | Age: 79
DRG: 871 | End: 2024-12-13
Payer: COMMERCIAL

## 2024-12-13 ENCOUNTER — APPOINTMENT (OUTPATIENT)
Dept: CT IMAGING | Facility: HOSPITAL | Age: 79
DRG: 871 | End: 2024-12-13
Payer: COMMERCIAL

## 2024-12-13 DIAGNOSIS — R10.9 ABDOMINAL PAIN: ICD-10-CM

## 2024-12-13 DIAGNOSIS — N17.9 ACUTE KIDNEY INJURY (HCC): Primary | ICD-10-CM

## 2024-12-13 DIAGNOSIS — I50.42 CHRONIC COMBINED SYSTOLIC AND DIASTOLIC CONGESTIVE HEART FAILURE (HCC): ICD-10-CM

## 2024-12-13 DIAGNOSIS — I48.0 PAF (PAROXYSMAL ATRIAL FIBRILLATION) (HCC): ICD-10-CM

## 2024-12-13 DIAGNOSIS — I48.0 PAROXYSMAL ATRIAL FIBRILLATION (HCC): ICD-10-CM

## 2024-12-13 DIAGNOSIS — J18.9 COMMUNITY ACQUIRED PNEUMONIA, UNSPECIFIED LATERALITY: ICD-10-CM

## 2024-12-13 PROBLEM — R65.20 SEVERE SEPSIS (HCC): Status: ACTIVE | Noted: 2024-12-13

## 2024-12-13 PROBLEM — N30.90 CYSTITIS: Status: ACTIVE | Noted: 2024-12-13

## 2024-12-13 PROBLEM — A41.9 SEVERE SEPSIS (HCC): Status: ACTIVE | Noted: 2024-12-13

## 2024-12-13 PROBLEM — N18.31 STAGE 3A CHRONIC KIDNEY DISEASE (HCC): Status: ACTIVE | Noted: 2024-12-13

## 2024-12-13 LAB
2HR DELTA HS TROPONIN: -3 NG/L
ALBUMIN SERPL BCG-MCNC: 3.6 G/DL (ref 3.5–5)
ALP SERPL-CCNC: 65 U/L (ref 34–104)
ALT SERPL W P-5'-P-CCNC: 15 U/L (ref 7–52)
AMORPH URATE CRY URNS QL MICRO: ABNORMAL
ANION GAP SERPL CALCULATED.3IONS-SCNC: 8 MMOL/L (ref 4–13)
APTT PPP: 46 SECONDS (ref 23–34)
AST SERPL W P-5'-P-CCNC: 14 U/L (ref 13–39)
BACTERIA UR QL AUTO: ABNORMAL /HPF
BASOPHILS # BLD AUTO: 0.03 THOUSANDS/ÂΜL (ref 0–0.1)
BASOPHILS NFR BLD AUTO: 0 % (ref 0–1)
BILIRUB SERPL-MCNC: 3.13 MG/DL (ref 0.2–1)
BILIRUB UR QL STRIP: NEGATIVE
BNP SERPL-MCNC: 749 PG/ML (ref 0–100)
BUN SERPL-MCNC: 20 MG/DL (ref 5–25)
CALCIUM SERPL-MCNC: 8.5 MG/DL (ref 8.4–10.2)
CARDIAC TROPONIN I PNL SERPL HS: 21 NG/L (ref ?–50)
CARDIAC TROPONIN I PNL SERPL HS: 24 NG/L (ref ?–50)
CHLORIDE SERPL-SCNC: 104 MMOL/L (ref 96–108)
CLARITY UR: ABNORMAL
CO2 SERPL-SCNC: 20 MMOL/L (ref 21–32)
COLOR UR: ABNORMAL
CREAT SERPL-MCNC: 1.39 MG/DL (ref 0.6–1.3)
EOSINOPHIL # BLD AUTO: 0.16 THOUSAND/ÂΜL (ref 0–0.61)
EOSINOPHIL NFR BLD AUTO: 1 % (ref 0–6)
ERYTHROCYTE [DISTWIDTH] IN BLOOD BY AUTOMATED COUNT: 13.2 % (ref 11.6–15.1)
FLUAV AG UPPER RESP QL IA.RAPID: NEGATIVE
FLUBV AG UPPER RESP QL IA.RAPID: NEGATIVE
GFR SERPL CREATININE-BSD FRML MDRD: 47 ML/MIN/1.73SQ M
GLUCOSE SERPL-MCNC: 126 MG/DL (ref 65–140)
GLUCOSE UR STRIP-MCNC: NEGATIVE MG/DL
GRAN CASTS #/AREA URNS LPF: ABNORMAL /[LPF]
HCT VFR BLD AUTO: 41.9 % (ref 36.5–49.3)
HGB BLD-MCNC: 13.6 G/DL (ref 12–17)
HGB UR QL STRIP.AUTO: ABNORMAL
HYALINE CASTS #/AREA URNS LPF: ABNORMAL /LPF
IMM GRANULOCYTES # BLD AUTO: 0.13 THOUSAND/UL (ref 0–0.2)
IMM GRANULOCYTES NFR BLD AUTO: 1 % (ref 0–2)
INR PPP: 1.33 (ref 0.85–1.19)
KETONES UR STRIP-MCNC: ABNORMAL MG/DL
LACTATE SERPL-SCNC: 1.3 MMOL/L (ref 0.5–2)
LEUKOCYTE ESTERASE UR QL STRIP: ABNORMAL
LIPASE SERPL-CCNC: <6 U/L (ref 11–82)
LYMPHOCYTES # BLD AUTO: 2.46 THOUSANDS/ÂΜL (ref 0.6–4.47)
LYMPHOCYTES NFR BLD AUTO: 16 % (ref 14–44)
MCH RBC QN AUTO: 32.9 PG (ref 26.8–34.3)
MCHC RBC AUTO-ENTMCNC: 32.5 G/DL (ref 31.4–37.4)
MCV RBC AUTO: 101 FL (ref 82–98)
MONOCYTES # BLD AUTO: 1.09 THOUSAND/ÂΜL (ref 0.17–1.22)
MONOCYTES NFR BLD AUTO: 7 % (ref 4–12)
MUCOUS THREADS UR QL AUTO: ABNORMAL
NEUTROPHILS # BLD AUTO: 11.56 THOUSANDS/ÂΜL (ref 1.85–7.62)
NEUTS SEG NFR BLD AUTO: 75 % (ref 43–75)
NITRITE UR QL STRIP: NEGATIVE
NON-SQ EPI CELLS URNS QL MICRO: ABNORMAL /HPF
NRBC BLD AUTO-RTO: 0 /100 WBCS
PH UR STRIP.AUTO: 5.5 [PH]
PLATELET # BLD AUTO: 164 THOUSANDS/UL (ref 149–390)
PMV BLD AUTO: 9.9 FL (ref 8.9–12.7)
POTASSIUM SERPL-SCNC: 3.8 MMOL/L (ref 3.5–5.3)
PROCALCITONIN SERPL-MCNC: 1.07 NG/ML
PROT SERPL-MCNC: 6.8 G/DL (ref 6.4–8.4)
PROT UR STRIP-MCNC: ABNORMAL MG/DL
PROTHROMBIN TIME: 17 SECONDS (ref 12.3–15)
QRS AXIS: 129 DEGREES
QRSD INTERVAL: 142 MS
QT INTERVAL: 362 MS
QTC INTERVAL: 551 MS
RBC # BLD AUTO: 4.14 MILLION/UL (ref 3.88–5.62)
RBC #/AREA URNS AUTO: ABNORMAL /HPF
SARS-COV+SARS-COV-2 AG RESP QL IA.RAPID: NEGATIVE
SODIUM SERPL-SCNC: 132 MMOL/L (ref 135–147)
SP GR UR STRIP.AUTO: >=1.03 (ref 1–1.03)
T WAVE AXIS: 37 DEGREES
TRI-PHOS CRY URNS QL MICRO: ABNORMAL /HPF
UROBILINOGEN UR STRIP-ACNC: <2 MG/DL
VENTRICULAR RATE: 139 BPM
WBC # BLD AUTO: 15.43 THOUSAND/UL (ref 4.31–10.16)
WBC #/AREA URNS AUTO: ABNORMAL /HPF

## 2024-12-13 PROCEDURE — 99223 1ST HOSP IP/OBS HIGH 75: CPT | Performed by: PHYSICIAN ASSISTANT

## 2024-12-13 PROCEDURE — 85025 COMPLETE CBC W/AUTO DIFF WBC: CPT | Performed by: EMERGENCY MEDICINE

## 2024-12-13 PROCEDURE — 85730 THROMBOPLASTIN TIME PARTIAL: CPT | Performed by: EMERGENCY MEDICINE

## 2024-12-13 PROCEDURE — 93010 ELECTROCARDIOGRAM REPORT: CPT | Performed by: INTERNAL MEDICINE

## 2024-12-13 PROCEDURE — 83605 ASSAY OF LACTIC ACID: CPT | Performed by: EMERGENCY MEDICINE

## 2024-12-13 PROCEDURE — 81001 URINALYSIS AUTO W/SCOPE: CPT | Performed by: EMERGENCY MEDICINE

## 2024-12-13 PROCEDURE — 87040 BLOOD CULTURE FOR BACTERIA: CPT | Performed by: EMERGENCY MEDICINE

## 2024-12-13 PROCEDURE — 80053 COMPREHEN METABOLIC PANEL: CPT | Performed by: EMERGENCY MEDICINE

## 2024-12-13 PROCEDURE — 87811 SARS-COV-2 COVID19 W/OPTIC: CPT | Performed by: EMERGENCY MEDICINE

## 2024-12-13 PROCEDURE — 83880 ASSAY OF NATRIURETIC PEPTIDE: CPT | Performed by: EMERGENCY MEDICINE

## 2024-12-13 PROCEDURE — 87086 URINE CULTURE/COLONY COUNT: CPT | Performed by: PHYSICIAN ASSISTANT

## 2024-12-13 PROCEDURE — 83690 ASSAY OF LIPASE: CPT | Performed by: PHYSICIAN ASSISTANT

## 2024-12-13 PROCEDURE — 96360 HYDRATION IV INFUSION INIT: CPT

## 2024-12-13 PROCEDURE — 74176 CT ABD & PELVIS W/O CONTRAST: CPT

## 2024-12-13 PROCEDURE — 99285 EMERGENCY DEPT VISIT HI MDM: CPT

## 2024-12-13 PROCEDURE — 71046 X-RAY EXAM CHEST 2 VIEWS: CPT

## 2024-12-13 PROCEDURE — 84145 PROCALCITONIN (PCT): CPT | Performed by: EMERGENCY MEDICINE

## 2024-12-13 PROCEDURE — 85610 PROTHROMBIN TIME: CPT | Performed by: EMERGENCY MEDICINE

## 2024-12-13 PROCEDURE — 36415 COLL VENOUS BLD VENIPUNCTURE: CPT

## 2024-12-13 PROCEDURE — 99285 EMERGENCY DEPT VISIT HI MDM: CPT | Performed by: EMERGENCY MEDICINE

## 2024-12-13 PROCEDURE — 84484 ASSAY OF TROPONIN QUANT: CPT | Performed by: EMERGENCY MEDICINE

## 2024-12-13 PROCEDURE — 87804 INFLUENZA ASSAY W/OPTIC: CPT | Performed by: EMERGENCY MEDICINE

## 2024-12-13 PROCEDURE — 93005 ELECTROCARDIOGRAM TRACING: CPT

## 2024-12-13 RX ORDER — SENNOSIDES 8.6 MG
1 TABLET ORAL
Status: DISCONTINUED | OUTPATIENT
Start: 2024-12-13 | End: 2024-12-16 | Stop reason: HOSPADM

## 2024-12-13 RX ORDER — CEFTRIAXONE 1 G/50ML
1000 INJECTION, SOLUTION INTRAVENOUS EVERY 24 HOURS
Status: DISCONTINUED | OUTPATIENT
Start: 2024-12-13 | End: 2024-12-16 | Stop reason: HOSPADM

## 2024-12-13 RX ORDER — ACETAMINOPHEN 325 MG/1
650 TABLET ORAL EVERY 6 HOURS PRN
Status: DISCONTINUED | OUTPATIENT
Start: 2024-12-13 | End: 2024-12-16 | Stop reason: HOSPADM

## 2024-12-13 RX ORDER — PANTOPRAZOLE SODIUM 40 MG/1
1 TABLET, DELAYED RELEASE ORAL DAILY
COMMUNITY
Start: 2024-10-09

## 2024-12-13 RX ORDER — ATORVASTATIN CALCIUM 40 MG/1
40 TABLET, FILM COATED ORAL
Status: DISCONTINUED | OUTPATIENT
Start: 2024-12-14 | End: 2024-12-16 | Stop reason: HOSPADM

## 2024-12-13 RX ORDER — FLUTICASONE PROPIONATE 50 MCG
1 SPRAY, SUSPENSION (ML) NASAL 2 TIMES DAILY
Status: DISCONTINUED | OUTPATIENT
Start: 2024-12-13 | End: 2024-12-16 | Stop reason: HOSPADM

## 2024-12-13 RX ORDER — METOPROLOL TARTRATE 50 MG
100 TABLET ORAL EVERY 12 HOURS SCHEDULED
Status: DISCONTINUED | OUTPATIENT
Start: 2024-12-13 | End: 2024-12-16 | Stop reason: HOSPADM

## 2024-12-13 RX ORDER — DOXYCYCLINE 100 MG/1
100 CAPSULE ORAL EVERY 12 HOURS SCHEDULED
Status: DISCONTINUED | OUTPATIENT
Start: 2024-12-13 | End: 2024-12-16 | Stop reason: HOSPADM

## 2024-12-13 RX ORDER — LEVOTHYROXINE SODIUM 88 UG/1
88 TABLET ORAL
COMMUNITY
Start: 2024-08-30

## 2024-12-13 RX ORDER — LEVOTHYROXINE SODIUM 88 UG/1
88 TABLET ORAL
Status: DISCONTINUED | OUTPATIENT
Start: 2024-12-13 | End: 2024-12-16 | Stop reason: HOSPADM

## 2024-12-13 RX ORDER — GUAIFENESIN 600 MG/1
600 TABLET, EXTENDED RELEASE ORAL 2 TIMES DAILY
Status: DISCONTINUED | OUTPATIENT
Start: 2024-12-13 | End: 2024-12-16 | Stop reason: HOSPADM

## 2024-12-13 RX ORDER — FLUTICASONE PROPIONATE 50 MCG
1 SPRAY, SUSPENSION (ML) NASAL 2 TIMES DAILY
COMMUNITY
Start: 2024-11-25

## 2024-12-13 RX ORDER — MAGNESIUM HYDROXIDE/ALUMINUM HYDROXICE/SIMETHICONE 120; 1200; 1200 MG/30ML; MG/30ML; MG/30ML
30 SUSPENSION ORAL EVERY 6 HOURS PRN
Status: DISCONTINUED | OUTPATIENT
Start: 2024-12-13 | End: 2024-12-13

## 2024-12-13 RX ORDER — ROSUVASTATIN CALCIUM 20 MG/1
20 TABLET, COATED ORAL DAILY
COMMUNITY
Start: 2024-08-30

## 2024-12-13 RX ORDER — SODIUM CHLORIDE, SODIUM GLUCONATE, SODIUM ACETATE, POTASSIUM CHLORIDE, MAGNESIUM CHLORIDE, SODIUM PHOSPHATE, DIBASIC, AND POTASSIUM PHOSPHATE .53; .5; .37; .037; .03; .012; .00082 G/100ML; G/100ML; G/100ML; G/100ML; G/100ML; G/100ML; G/100ML
50 INJECTION, SOLUTION INTRAVENOUS CONTINUOUS
Status: DISPENSED | OUTPATIENT
Start: 2024-12-13 | End: 2024-12-14

## 2024-12-13 RX ORDER — TAMSULOSIN HYDROCHLORIDE 0.4 MG/1
0.8 CAPSULE ORAL
COMMUNITY
Start: 2024-10-09

## 2024-12-13 RX ORDER — TAMSULOSIN HYDROCHLORIDE 0.4 MG/1
0.8 CAPSULE ORAL
Status: DISCONTINUED | OUTPATIENT
Start: 2024-12-13 | End: 2024-12-16 | Stop reason: HOSPADM

## 2024-12-13 RX ORDER — PANTOPRAZOLE SODIUM 40 MG/1
40 TABLET, DELAYED RELEASE ORAL
Status: DISCONTINUED | OUTPATIENT
Start: 2024-12-14 | End: 2024-12-16 | Stop reason: HOSPADM

## 2024-12-13 RX ORDER — OMEGA-3S/DHA/EPA/FISH OIL/D3 300MG-1000
400 CAPSULE ORAL DAILY
COMMUNITY

## 2024-12-13 RX ORDER — ASPIRIN 81 MG/1
81 TABLET ORAL DAILY
Status: DISCONTINUED | OUTPATIENT
Start: 2024-12-14 | End: 2024-12-16 | Stop reason: HOSPADM

## 2024-12-13 RX ADMIN — DOXYCYCLINE 100 MG: 100 CAPSULE ORAL at 22:52

## 2024-12-13 RX ADMIN — FLUTICASONE PROPIONATE 1 SPRAY: 50 SPRAY, METERED NASAL at 22:55

## 2024-12-13 RX ADMIN — METOPROLOL TARTRATE 100 MG: 50 TABLET, FILM COATED ORAL at 22:52

## 2024-12-13 RX ADMIN — GUAIFENESIN 600 MG: 600 TABLET ORAL at 22:52

## 2024-12-13 RX ADMIN — TAMSULOSIN HYDROCHLORIDE 0.8 MG: 0.4 CAPSULE ORAL at 22:52

## 2024-12-13 RX ADMIN — APIXABAN 5 MG: 5 TABLET, FILM COATED ORAL at 22:52

## 2024-12-13 RX ADMIN — LEVOTHYROXINE SODIUM 88 MCG: 88 TABLET ORAL at 22:52

## 2024-12-13 RX ADMIN — SODIUM CHLORIDE 250 ML: 0.9 INJECTION, SOLUTION INTRAVENOUS at 17:19

## 2024-12-13 RX ADMIN — SODIUM CHLORIDE, SODIUM GLUCONATE, SODIUM ACETATE, POTASSIUM CHLORIDE, MAGNESIUM CHLORIDE, SODIUM PHOSPHATE, DIBASIC, AND POTASSIUM PHOSPHATE 50 ML/HR: .53; .5; .37; .037; .03; .012; .00082 INJECTION, SOLUTION INTRAVENOUS at 23:26

## 2024-12-13 RX ADMIN — CEFTRIAXONE 1000 MG: 1 INJECTION, SOLUTION INTRAVENOUS at 22:51

## 2024-12-13 NOTE — ED PROVIDER NOTES
Time reflects when diagnosis was documented in both MDM as applicable and the Disposition within this note       Time User Action Codes Description Comment    12/16/2024  8:49 AM Aneudy Araiza J Add [E86.0] Dehydration     12/16/2024  8:49 AM FalinoAneudy J Add [N17.9] Acute kidney failure (HCC)     12/16/2024  8:49 AM Falino, Aneudy J Remove [N17.9] Acute kidney failure (HCC)     12/16/2024  8:50 AM Falino, Aneudy J Add [N17.9] Acute kidney injury (HCC)     12/16/2024  8:50 AM Falino, Aneudy J Add [I48.0] Paroxysmal atrial fibrillation (HCC)     12/16/2024  9:02 AM Aneudy Araiza J Modify [N17.9] Acute kidney injury (HCC)     12/16/2024  9:02 AM Aneudy Araiza J Remove [E86.0] Dehydration     12/16/2024  9:03 AM Aneudy Araiza J Add [R10.9] Abdominal pain     12/16/2024  9:03 AM FalinoAneudy J Add [I50.42] Chronic combined systolic and diastolic congestive heart failure (HCC)           ED Disposition       ED Disposition   Admit    Condition   Stable    Date/Time   Fri Dec 13, 2024  7:15 PM    Comment   Case was discussed with Dr. Ashby and the patient's admission status was agreed to be Admission Status: observation status to the service of Dr. Ashby .               Assessment & Plan       Medical Decision Making  79-year-old male with history of paroxysmal A-fib, chronic combined systolic and diastolic heart failure comes in with dyspnea, cough, fatigue, epigastric discomfort.  Concerns include ACS, dysrhythmia, dehydration, electrolyte abnormality, pancreatitis, gastritis, pancreatitis, peptic ulcer, colitis, pneumonia.  Will check labs and imaging.    Amount and/or Complexity of Data Reviewed  Independent Historian: spouse  External Data Reviewed: labs, ECG and notes.  Labs: ordered.  Radiology: ordered.  ECG/medicine tests: ordered and independent interpretation performed.    Risk  Decision regarding hospitalization.        ED Course as of 12/16/24 0903   Fri Dec 13, 2024   1849 Lightly sleeping  "when I entered the room, wife at bedside.  He woke up with us talking.  Pulse ox approximately 100-105.  Feels more comfortable.  Presented to internal medicine for admission for CHF exacerbation.       Medications   sodium chloride 0.9 % bolus 250 mL (0 mL Intravenous Stopped 12/13/24 1819)       ED Risk Strat Scores   HEART Risk Score      Flowsheet Row Most Recent Value   Heart Score Risk Calculator    History 0 Filed at: 12/16/2024 0902   ECG 1 Filed at: 12/16/2024 0902   Age 2 Filed at: 12/16/2024 0902   Risk Factors 1 Filed at: 12/16/2024 0902   Troponin 1 Filed at: 12/16/2024 0902   HEART Score 5 Filed at: 12/16/2024 0902          HEART Risk Score      Flowsheet Row Most Recent Value   Heart Score Risk Calculator    History 0 Filed at: 12/16/2024 0902   ECG 1 Filed at: 12/16/2024 0902   Age 2 Filed at: 12/16/2024 0902   Risk Factors 1 Filed at: 12/16/2024 0902   Troponin 1 Filed at: 12/16/2024 0902   HEART Score 5 Filed at: 12/16/2024 0902                                                History of Present Illness       Chief Complaint   Patient presents with    Shortness of Breath     Pt states \"On Wednesday morning I had an endoscopy and I slept all day and I have pain in my stomach and feel sob\" Denies any cp, bowel or urine complications       Past Medical History:   Diagnosis Date    Dysphagia     Hypertension       Past Surgical History:   Procedure Laterality Date    CHOLECYSTECTOMY LAPAROSCOPIC N/A 2/8/2021    Procedure: CHOLECYSTECTOMY LAPAROSCOPIC;  Surgeon: Wally Young MD;  Location: [facility] OR;  Service: General    CORONARY STENT PLACEMENT        History reviewed. No pertinent family history.   Social History     Tobacco Use    Smoking status: Never    Smokeless tobacco: Never   Vaping Use    Vaping status: Never Used   Substance Use Topics    Alcohol use: Yes     Alcohol/week: 1.0 standard drink of alcohol     Types: 1 Glasses of wine per week     Comment: daily 2oz of wine    Drug use: Never    "   E-Cigarette/Vaping    E-Cigarette Use Never User       E-Cigarette/Vaping Substances    Nicotine No     THC No     CBD No     Flavoring No     Other No     Unknown No       I have reviewed and agree with the history as documented.     79-year-old male from home with history of chronic combined systolic and diastolic heart failure, CAD status post stents in 2018, hypertension, paroxysmal atrial fibrillation on metoprolol and Eliquis, sleep apnea complains of excessive fatigue and sleepiness over the past 3 days.  He had nasal endoscopy 3 days ago under mild anesthesia.  ENT feels his sleepiness is due to that.  Patient also notes nonproductive cough, epigastric discomfort with deep breaths and cough, anorexia and darker than normal urine over the past 3 days.  Wife notes his T-shirt was wet and he is slightly diaphoretic at this time.  Denies chest pain, palpitations, bloody stool.  Status post cholecystectomy.        Review of Systems   Constitutional:  Positive for activity change, appetite change, chills, diaphoresis and fatigue. Negative for fever.   HENT:  Negative for congestion and sore throat.    Respiratory:  Positive for cough and shortness of breath.    Cardiovascular:  Negative for chest pain, palpitations and leg swelling.   Gastrointestinal:  Positive for abdominal pain. Negative for blood in stool, constipation, diarrhea and nausea.   Genitourinary:  Positive for decreased urine volume. Negative for difficulty urinating.        Urine is darker than normal   Skin:  Negative for rash and wound.   Neurological:  Negative for dizziness, syncope and light-headedness.           Objective       ED Triage Vitals [12/13/24 1548]   Temperature Pulse Blood Pressure Respirations SpO2 Patient Position - Orthostatic VS   97.5 °F (36.4 °C) 63 113/58 20 99 % Sitting      Temp Source Heart Rate Source BP Location FiO2 (%) Pain Score    Temporal Monitor Right arm -- 8      Vitals      Date and Time Temp Pulse SpO2  Resp BP Pain Score FACES Pain Rating User   12/16/24 0754 -- 80 95 % 18 -- -- -- NB   12/16/24 0754 98 °F (36.7 °C) -- -- -- 132/70 -- -- DII   12/16/24 0558 -- 71 94 % 18 -- No Pain -- NS   12/16/24 0558 97.4 °F (36.3 °C) -- -- -- 125/70 -- -- DII   12/15/24 2213 -- -- -- -- -- 7 -- NS   12/15/24 2049 -- 81 94 % 16 -- -- -- ES   12/15/24 2049 98.4 °F (36.9 °C) -- -- -- 134/67 -- -- DII   12/15/24 1449 98.1 °F (36.7 °C) -- -- 20 123/64 -- -- DII   12/15/24 0900 -- 90 -- -- -- -- -- BW   12/15/24 0800 -- -- -- -- -- No Pain -- BW   12/15/24 0731 97.6 °F (36.4 °C) -- -- 20 129/77 -- -- DII   12/14/24 2029 -- 92 93 % 19 -- No Pain -- PADMAJA   12/14/24 2029 98.7 °F (37.1 °C) -- -- -- 116/66 -- -- DII   12/14/24 0851 -- 93 92 % -- -- -- -- CD   12/14/24 0851 98.2 °F (36.8 °C) -- -- -- 100/61 -- -- DII   12/14/24 0800 -- -- 96 % -- -- No Pain -- CD   12/14/24 0521 -- 84 92 % 20 -- -- -- DG   12/14/24 0521 98.1 °F (36.7 °C) -- -- -- 100/66 -- -- DII   12/13/24 2252 -- 105 -- -- -- -- -- DG   12/13/24 2251 -- -- -- -- 120/69 -- -- DII   12/13/24 2025 -- -- -- -- -- No Pain -- DG   12/13/24 2017 -- 105 93 % -- -- -- -- DG   12/13/24 2017 97.6 °F (36.4 °C) -- -- -- -- -- -- JK   12/13/24 2017 -- -- -- 20 139/81 -- -- DII   12/13/24 1900 -- 99 93 % 19 109/56 -- -- TH   12/13/24 1754 -- 108 91 % 18 97/55 -- -- GS   12/13/24 1548 97.5 °F (36.4 °C) 63 99 % 20 113/58 8 --             Physical Exam  Vitals and nursing note reviewed.   Constitutional:       General: He is not in acute distress.     Appearance: He is well-developed and normal weight. He is not ill-appearing or diaphoretic.   HENT:      Head: Normocephalic and atraumatic.      Right Ear: External ear normal.      Left Ear: External ear normal.      Mouth/Throat:      Mouth: Mucous membranes are moist.      Pharynx: Oropharynx is clear.   Eyes:      General: No scleral icterus.     Extraocular Movements: Extraocular movements intact.      Conjunctiva/sclera: Conjunctivae  normal.      Pupils: Pupils are equal, round, and reactive to light.   Neck:      Vascular: No JVD.   Cardiovascular:      Pulses: Normal pulses.           Carotid pulses are  on the right side with bruit and  on the left side with bruit.     Heart sounds: Normal heart sounds.      Comments: At times tachycardic and irregular, and other times slow and regular.  Pulmonary:      Effort: Pulmonary effort is normal. No respiratory distress.      Breath sounds: Examination of the right-lower field reveals rales. Examination of the left-lower field reveals rales. Rales present.   Chest:      Chest wall: No deformity or tenderness.   Abdominal:      General: Bowel sounds are normal.      Palpations: Abdomen is soft. There is no mass.      Tenderness: There is abdominal tenderness (Mild right upper quadrant and left upper quadrant tenderness). There is no guarding or rebound.   Musculoskeletal:         General: No tenderness. Normal range of motion.      Cervical back: Neck supple.      Right lower leg: No tenderness. No edema.      Left lower leg: No tenderness. No edema.   Lymphadenopathy:      Cervical: No cervical adenopathy.   Skin:     General: Skin is warm.      Capillary Refill: Capillary refill takes less than 2 seconds.      Findings: No rash.      Comments: Slight diaphoresis   Neurological:      General: No focal deficit present.      Mental Status: He is alert and oriented to person, place, and time. Mental status is at baseline.      Cranial Nerves: No cranial nerve deficit.      Coordination: Coordination normal.      Deep Tendon Reflexes: Reflexes are normal and symmetric.   Psychiatric:         Mood and Affect: Mood normal.         Behavior: Behavior normal.         Results Reviewed       Procedure Component Value Units Date/Time    Sputum culture and Gram stain [[id number]]  (Abnormal) Collected: 12/14/24 0645    Lab Status: Final result Specimen: Expectorated Sputum Updated: 12/16/24 0804     Sputum  Culture 3+ Growth of     Gram Stain Result Rare Epithelial cells per low power field      2+ Polys      1+ Gram negative rods      Rare Gram positive cocci in pairs    Blood culture #1 [[id number]] Collected: 12/13/24 1718    Lab Status: Preliminary result Specimen: Blood from Arm, Right Updated: 12/15/24 2101     Blood Culture No Growth at 48 hrs.    Blood culture #2 [[id number]] Collected: 12/13/24 1718    Lab Status: Preliminary result Specimen: Blood from Hand, Right Updated: 12/15/24 2101     Blood Culture No Growth at 48 hrs.    UA w Reflex to Microscopic w Reflex to Culture [[id number]]  (Abnormal) Collected: 12/13/24 2127    Lab Status: Final result Specimen: Urine, Clean Catch Updated: 12/13/24 2143     Color, UA Orange     Clarity, UA Cloudy     Specific Gravity, UA >=1.030     pH, UA 5.5     Leukocytes, UA Trace     Nitrite, UA Negative     Protein, UA 30 (1+) mg/dl      Glucose, UA Negative mg/dl      Ketones, UA Trace mg/dl      Urobilinogen, UA <2.0 mg/dl      Bilirubin, UA Negative     Occult Blood, UA Large    Lipase [[id number]]  (Abnormal) Collected: 12/13/24 1602    Lab Status: Final result Specimen: Blood from Arm, Left Updated: 12/13/24 2017     Lipase <6 u/L     HS Troponin I 2hr [[id number]]  (Normal) Collected: 12/13/24 1757    Lab Status: Final result Specimen: Blood from Arm, Right Updated: 12/13/24 1829     hs TnI 2hr 21 ng/L      Delta 2hr hsTnI -3 ng/L     Protime-INR [[id number]]  (Abnormal) Collected: 12/13/24 1718    Lab Status: Final result Specimen: Blood from Arm, Right Updated: 12/13/24 1818     Protime 17.0 seconds      INR 1.33    Narrative:      INR Therapeutic Range    Indication                                             INR Range      Atrial Fibrillation                                               2.0-3.0  Hypercoagulable State                                    2.0.2.3  Left Ventricular Asist Device                            2.0-3.0  Mechanical Heart Valve                                   -    Aortic(with afib, MI, embolism, HF, LA enlargement,    and/or coagulopathy)                                     2.0-3.0 (2.5-3.5)     Mitral                                                             2.5-3.5  Prosthetic/Bioprosthetic Heart Valve               2.0-3.0  Venous thromboembolism (VTE: VT, PE        2.0-3.0    APTT [[id number]]  (Abnormal) Collected: 12/13/24 1718    Lab Status: Final result Specimen: Blood from Arm, Right Updated: 12/13/24 1818     PTT 46 seconds     B-Type Natriuretic Peptide(BNP) [[id number]]  (Abnormal) Collected: 12/13/24 1602    Lab Status: Final result Specimen: Blood from Arm, Left Updated: 12/13/24 1816      pg/mL     Procalcitonin [[id number]]  (Abnormal) Collected: 12/13/24 1602    Lab Status: Final result Specimen: Blood from Arm, Left Updated: 12/13/24 1759     Procalcitonin 1.07 ng/ml     FLU/COVID Rapid Antigen (30 min. TAT) - Preferred screening test in ED [[id number]]  (Normal) Collected: 12/13/24 1718    Lab Status: Final result Specimen: Nares from Nose Updated: 12/13/24 1748     SARS COV Rapid Antigen Negative     Influenza A Rapid Antigen Negative     Influenza B Rapid Antigen Negative    Narrative:      This test has been performed using the [employer]acia 2 FLU+SARS Antigen test under the Emergency Use Authorization (EUA). This test has been validated by the  and verified by the performing laboratory. The Acacia uses lateral flow immunofluorescent sandwich assay to detect SARS-COV, Influenza A and Influenza B Antigen.     The [employer]acia 2 SARS Antigen test does not differentiate between SARS-CoV and SARS-CoV-2.     Negative results are presumptive and may be confirmed with a molecular assay, if necessary, for patient management. Negative results do not rule out SARS-CoV-2 or influenza infection and should not be used as the sole basis for treatment or patient management decisions. A negative test result may occur if the level of  antigen in a sample is below the limit of detection of this test.     Positive results are indicative of the presence of viral antigens, but do not rule out bacterial infection or co-infection with other viruses.     All test results should be used as an adjunct to clinical observations and other information available to the provider.    FOR PEDIATRIC PATIENTS - copy/paste COVID Guidelines URL to browser: [email or web address]    Lactic acid [[id number]]  (Normal) Collected: 12/13/24 1718    Lab Status: Final result Specimen: Blood from Arm, Right Updated: 12/13/24 1747     LACTIC ACID 1.3 mmol/L     Narrative:      Result may be elevated if tourniquet was used during collection.    HS Troponin 0hr (reflex protocol) [[id number]]  (Normal) Collected: 12/13/24 1602    Lab Status: Final result Specimen: Blood from Arm, Left Updated: 12/13/24 1636     hs TnI 0hr 24 ng/L     Comprehensive metabolic panel [[id number]]  (Abnormal) Collected: 12/13/24 1602    Lab Status: Final result Specimen: Blood from Arm, Left Updated: 12/13/24 1630     Sodium 132 mmol/L      Potassium 3.8 mmol/L      Chloride 104 mmol/L      CO2 20 mmol/L      ANION GAP 8 mmol/L      BUN 20 mg/dL      Creatinine 1.39 mg/dL      Glucose 126 mg/dL      Calcium 8.5 mg/dL      AST 14 U/L      ALT 15 U/L      Alkaline Phosphatase 65 U/L      Total Protein 6.8 g/dL      Albumin 3.6 g/dL      Total Bilirubin 3.13 mg/dL      eGFR 47 ml/min/1.73sq m     Narrative:      National Kidney Disease Foundation guidelines for Chronic Kidney Disease (CKD):     Stage 1 with normal or high GFR (GFR > 90 mL/min/1.73 square meters)    Stage 2 Mild CKD (GFR = 60-89 mL/min/1.73 square meters)    Stage 3A Moderate CKD (GFR = 45-59 mL/min/1.73 square meters)    Stage 3B Moderate CKD (GFR = 30-44 mL/min/1.73 square meters)    Stage 4 Severe CKD (GFR = 15-29 mL/min/1.73 square meters)    Stage 5 End Stage CKD (GFR <15 mL/min/1.73  square meters)  Note: GFR calculation is accurate only with a steady state creatinine    CBC and differential [[id number]]  (Abnormal) Collected: 12/13/24 1602    Lab Status: Final result Specimen: Blood from Arm, Left Updated: 12/13/24 1613     WBC 15.43 Thousand/uL      RBC 4.14 Million/uL      Hemoglobin 13.6 g/dL      Hematocrit 41.9 %       fL      MCH 32.9 pg      MCHC 32.5 g/dL      RDW 13.2 %      MPV 9.9 fL      Platelets 164 Thousands/uL      nRBC 0 /100 WBCs      Segmented % 75 %      Immature Grans % 1 %      Lymphocytes % 16 %      Monocytes % 7 %      Eosinophils Relative 1 %      Basophils Relative 0 %      Absolute Neutrophils 11.56 Thousands/µL      Absolute Immature Grans 0.13 Thousand/uL      Absolute Lymphocytes 2.46 Thousands/µL      Absolute Monocytes 1.09 Thousand/µL      Eosinophils Absolute 0.16 Thousand/µL      Basophils Absolute 0.03 Thousands/µL             CT abdomen pelvis wo contrast   Final Interpretation by Darci Munoz MD (12/13 3360)      1.  Bilateral lower lobe opacities may be due to pneumonia versus aspiration in the appropriate clinical setting.   2.  Mildly nodular contour of the liver correlate clinically for cirrhosis.   3.  Diverticulosis without evidence of diverticulitis.      Workstation performed: [id number]         XR chest pa and lateral   Final Interpretation by Antonia Chavez MD (12/13 8113)      No acute cardiopulmonary disease.            Workstation performed: [id number]             ECG 12 Lead Documentation Only    Date/Time: 12/13/2024 4:58 PM    Performed by: Aneudy Araiza DO  Authorized by: Aneudy Araiza DO    ECG reviewed by me, the ED Provider: yes    Patient location:  ED  Previous ECG:     Previous ECG:  Compared to current    Comparison ECG info:  Atrial fibrillation has replaced normal sinus rhythm.    Similarity:  Changes noted    Comparison to cardiac monitor: Yes    Rate:     ECG rate assessment: tachycardic    Rhythm:     Rhythm:  atrial fibrillation    Ectopy:     Ectopy: PVCs      PVCs:  Infrequent and unifocal  QRS:     QRS axis:  Normal    QRS intervals:  Wide  Conduction:     Conduction: abnormal      Abnormal conduction: complete RBBB    ST segments:     ST segments:  Normal  T waves:     T waves: normal        ED Medication and Procedure Management   Prior to Admission Medications   Prescriptions Last Dose Informant Patient Reported? Taking?   Multiple Vitamin (MULTIVITAMIN ADULT PO)   Yes Yes   Sig: Take by mouth   apixaban (ELIQUIS) 5 mg 2024 Evening Self No Yes   Sig: Take 1 tablet (5 mg total) by mouth 2 (two) times a day   aspirin (ECOTRIN LOW STRENGTH) 81 mg EC tablet Past Week Self Yes Yes   Sig: Take 81 mg by mouth daily   cholecalciferol (VITAMIN D3) 400 units tablet 2024  Yes Yes   Sig: Take 400 Units by mouth daily   dicyclomine (BENTYL) 20 mg tablet   No No   Sig: Take 1 tablet (20 mg total) by mouth every 6 (six) hours as needed (abd pain)   fluticasone (FLONASE) 50 mcg/act nasal spray 2024  Yes Yes   Si spray into each nostril 2 (two) times a day   levothyroxine 88 mcg tablet 2024 Bedtime  Yes Yes   Sig: Take 88 mcg by mouth daily at bedtime   losartan (COZAAR) 25 mg tablet 2024 Morning  Yes Yes   Sig: Take 25 mg by mouth daily   metoprolol tartrate 75 MG TABS 2024 Evening Self No Yes   Sig: Take 1 tablet (75 mg total) by mouth every 12 (twelve) hours   Patient taking differently: Take 100 mg by mouth every 12 (twelve) hours   pantoprazole (PROTONIX) 40 mg tablet 2024  Yes Yes   Sig: Take 1 tablet by mouth daily   rosuvastatin (CRESTOR) 20 MG tablet 2024  Yes Yes   Sig: Take 20 mg by mouth daily   tamsulosin (FLOMAX) 0.4 mg 2024  Yes Yes   Sig: Take 0.8 mg by mouth daily at bedtime   tiotropium-olodaterol (STIOLTO RESPIMAT) 2.5-2.5 MCG/ACT inhaler 2024  Yes Yes   Sig: Inhale 2 puffs daily      Facility-Administered Medications: None     Current Discharge  Medication List        CONTINUE these medications which have NOT CHANGED    Details   apixaban (ELIQUIS) 5 mg Take 1 tablet (5 mg total) by mouth 2 (two) times a day  Qty: 60 tablet, Refills: 0    Associated Diagnoses: PAF (paroxysmal atrial fibrillation) (HCC)      aspirin (ECOTRIN LOW STRENGTH) 81 mg EC tablet Take 81 mg by mouth daily      cholecalciferol (VITAMIN D3) 400 units tablet Take 400 Units by mouth daily      fluticasone (FLONASE) 50 mcg/act nasal spray 1 spray into each nostril 2 (two) times a day      levothyroxine 88 mcg tablet Take 88 mcg by mouth daily at bedtime      losartan (COZAAR) 25 mg tablet Take 25 mg by mouth daily      metoprolol tartrate 75 MG TABS Take 1 tablet (75 mg total) by mouth every 12 (twelve) hours  Qty: 60 tablet, Refills: 0    Associated Diagnoses: PAF (paroxysmal atrial fibrillation) (HCC)      Multiple Vitamin (MULTIVITAMIN ADULT PO) Take by mouth      pantoprazole (PROTONIX) 40 mg tablet Take 1 tablet by mouth daily      rosuvastatin (CRESTOR) 20 MG tablet Take 20 mg by mouth daily      tamsulosin (FLOMAX) 0.4 mg Take 0.8 mg by mouth daily at bedtime      tiotropium-olodaterol (STIOLTO RESPIMAT) 2.5-2.5 MCG/ACT inhaler Inhale 2 puffs daily      dicyclomine (BENTYL) 20 mg tablet Take 1 tablet (20 mg total) by mouth every 6 (six) hours as needed (abd pain)  Qty: 30 tablet, Refills: 0    Associated Diagnoses: Abdominal pain           No discharge procedures on file.  ED SEPSIS DOCUMENTATION   Time reflects when diagnosis was documented in both MDM as applicable and the Disposition within this note       Time User Action Codes Description Comment    12/16/2024  8:49 AM Aneudy Araiza [E86.0] Dehydration     12/16/2024  8:49 AM Aneudy Araiza Add [N17.9] Acute kidney failure (HCC)     12/16/2024  8:49 AM Aneudy Araiza Remove [N17.9] Acute kidney failure (HCC)     12/16/2024  8:50 AM Aneudy Araiza Add [N17.9] Acute kidney injury (HCC)     12/16/2024  8:50 AM  Aneudy Araiza Add [I48.0] Paroxysmal atrial fibrillation (HCC)     12/16/2024  9:02 AM Aneudy Araiza Modify [N17.9] Acute kidney injury (HCC)     12/16/2024  9:02 AM Aneudy Araiza Remove [E86.0] Dehydration     12/16/2024  9:03 AM Aneudy Araiza Add [R10.9] Abdominal pain     12/16/2024  9:03 AM Aneudy Araiza Add [I50.42] Chronic combined systolic and diastolic congestive heart failure (HCC)                  Aneudy Araiza, DO  12/16/24 0835       Aneudy Araiza,   12/16/24 0903

## 2024-12-14 LAB
ANION GAP SERPL CALCULATED.3IONS-SCNC: 9 MMOL/L (ref 4–13)
BUN SERPL-MCNC: 24 MG/DL (ref 5–25)
CALCIUM SERPL-MCNC: 8 MG/DL (ref 8.4–10.2)
CHLORIDE SERPL-SCNC: 104 MMOL/L (ref 96–108)
CO2 SERPL-SCNC: 21 MMOL/L (ref 21–32)
CREAT SERPL-MCNC: 1.35 MG/DL (ref 0.6–1.3)
ERYTHROCYTE [DISTWIDTH] IN BLOOD BY AUTOMATED COUNT: 13.2 % (ref 11.6–15.1)
GFR SERPL CREATININE-BSD FRML MDRD: 48 ML/MIN/1.73SQ M
GLUCOSE SERPL-MCNC: 116 MG/DL (ref 65–140)
HCT VFR BLD AUTO: 38.4 % (ref 36.5–49.3)
HGB BLD-MCNC: 12.5 G/DL (ref 12–17)
L PNEUMO1 AG UR QL IA.RAPID: NEGATIVE
MAGNESIUM SERPL-MCNC: 2.2 MG/DL (ref 1.9–2.7)
MCH RBC QN AUTO: 32.8 PG (ref 26.8–34.3)
MCHC RBC AUTO-ENTMCNC: 32.6 G/DL (ref 31.4–37.4)
MCV RBC AUTO: 101 FL (ref 82–98)
PHOSPHATE SERPL-MCNC: 3.4 MG/DL (ref 2.3–4.1)
PLATELET # BLD AUTO: 153 THOUSANDS/UL (ref 149–390)
PMV BLD AUTO: 10.4 FL (ref 8.9–12.7)
POTASSIUM SERPL-SCNC: 3.9 MMOL/L (ref 3.5–5.3)
PROCALCITONIN SERPL-MCNC: 0.97 NG/ML
RBC # BLD AUTO: 3.81 MILLION/UL (ref 3.88–5.62)
S PNEUM AG UR QL: NEGATIVE
SODIUM SERPL-SCNC: 134 MMOL/L (ref 135–147)
WBC # BLD AUTO: 12.16 THOUSAND/UL (ref 4.31–10.16)

## 2024-12-14 PROCEDURE — 87205 SMEAR GRAM STAIN: CPT | Performed by: PHYSICIAN ASSISTANT

## 2024-12-14 PROCEDURE — 85027 COMPLETE CBC AUTOMATED: CPT | Performed by: PHYSICIAN ASSISTANT

## 2024-12-14 PROCEDURE — 94664 DEMO&/EVAL PT USE INHALER: CPT

## 2024-12-14 PROCEDURE — 99232 SBSQ HOSP IP/OBS MODERATE 35: CPT | Performed by: STUDENT IN AN ORGANIZED HEALTH CARE EDUCATION/TRAINING PROGRAM

## 2024-12-14 PROCEDURE — 84145 PROCALCITONIN (PCT): CPT | Performed by: PHYSICIAN ASSISTANT

## 2024-12-14 PROCEDURE — 83735 ASSAY OF MAGNESIUM: CPT | Performed by: PHYSICIAN ASSISTANT

## 2024-12-14 PROCEDURE — 87449 NOS EACH ORGANISM AG IA: CPT | Performed by: PHYSICIAN ASSISTANT

## 2024-12-14 PROCEDURE — 87070 CULTURE OTHR SPECIMN AEROBIC: CPT | Performed by: PHYSICIAN ASSISTANT

## 2024-12-14 PROCEDURE — 84100 ASSAY OF PHOSPHORUS: CPT | Performed by: PHYSICIAN ASSISTANT

## 2024-12-14 PROCEDURE — 80048 BASIC METABOLIC PNL TOTAL CA: CPT | Performed by: PHYSICIAN ASSISTANT

## 2024-12-14 RX ORDER — MAGNESIUM SULFATE 1 G/100ML
1 INJECTION INTRAVENOUS ONCE
Status: COMPLETED | OUTPATIENT
Start: 2024-12-14 | End: 2024-12-14

## 2024-12-14 RX ORDER — SODIUM CHLORIDE, SODIUM GLUCONATE, SODIUM ACETATE, POTASSIUM CHLORIDE, MAGNESIUM CHLORIDE, SODIUM PHOSPHATE, DIBASIC, AND POTASSIUM PHOSPHATE .53; .5; .37; .037; .03; .012; .00082 G/100ML; G/100ML; G/100ML; G/100ML; G/100ML; G/100ML; G/100ML
50 INJECTION, SOLUTION INTRAVENOUS CONTINUOUS
Status: DISPENSED | OUTPATIENT
Start: 2024-12-14 | End: 2024-12-14

## 2024-12-14 RX ORDER — BENZONATATE 100 MG/1
100 CAPSULE ORAL 3 TIMES DAILY PRN
Status: DISCONTINUED | OUTPATIENT
Start: 2024-12-14 | End: 2024-12-16 | Stop reason: HOSPADM

## 2024-12-14 RX ADMIN — CEFTRIAXONE 1000 MG: 1 INJECTION, SOLUTION INTRAVENOUS at 21:00

## 2024-12-14 RX ADMIN — GUAIFENESIN 600 MG: 600 TABLET ORAL at 17:31

## 2024-12-14 RX ADMIN — FLUTICASONE PROPIONATE 1 SPRAY: 50 SPRAY, METERED NASAL at 08:52

## 2024-12-14 RX ADMIN — MAGNESIUM SULFATE IN DEXTROSE 1 G: 10 INJECTION, SOLUTION INTRAVENOUS at 00:51

## 2024-12-14 RX ADMIN — UMECLIDINIUM 1 PUFF: 62.5 AEROSOL, POWDER ORAL at 08:52

## 2024-12-14 RX ADMIN — PANTOPRAZOLE SODIUM 40 MG: 40 TABLET, DELAYED RELEASE ORAL at 05:19

## 2024-12-14 RX ADMIN — CHOLECALCIFEROL (VITAMIN D3) 10 MCG (400 UNIT) TABLET 400 UNITS: at 08:48

## 2024-12-14 RX ADMIN — APIXABAN 5 MG: 5 TABLET, FILM COATED ORAL at 17:31

## 2024-12-14 RX ADMIN — METOPROLOL TARTRATE 100 MG: 50 TABLET, FILM COATED ORAL at 20:59

## 2024-12-14 RX ADMIN — FLUTICASONE PROPIONATE 1 SPRAY: 50 SPRAY, METERED NASAL at 21:00

## 2024-12-14 RX ADMIN — ASPIRIN 81 MG: 81 TABLET, COATED ORAL at 08:48

## 2024-12-14 RX ADMIN — LEVOTHYROXINE SODIUM 88 MCG: 88 TABLET ORAL at 20:59

## 2024-12-14 RX ADMIN — SODIUM CHLORIDE, SODIUM GLUCONATE, SODIUM ACETATE, POTASSIUM CHLORIDE, MAGNESIUM CHLORIDE, SODIUM PHOSPHATE, DIBASIC, AND POTASSIUM PHOSPHATE 50 ML/HR: .53; .5; .37; .037; .03; .012; .00082 INJECTION, SOLUTION INTRAVENOUS at 11:50

## 2024-12-14 RX ADMIN — GUAIFENESIN 600 MG: 600 TABLET ORAL at 08:48

## 2024-12-14 RX ADMIN — OLODATEROL RESPIMAT INHALATION SPRAY 2 PUFF: 2.5 SPRAY, METERED RESPIRATORY (INHALATION) at 08:52

## 2024-12-14 RX ADMIN — APIXABAN 5 MG: 5 TABLET, FILM COATED ORAL at 08:48

## 2024-12-14 RX ADMIN — DOXYCYCLINE 100 MG: 100 CAPSULE ORAL at 20:59

## 2024-12-14 RX ADMIN — TAMSULOSIN HYDROCHLORIDE 0.8 MG: 0.4 CAPSULE ORAL at 20:59

## 2024-12-14 RX ADMIN — DOXYCYCLINE 100 MG: 100 CAPSULE ORAL at 08:48

## 2024-12-14 RX ADMIN — ATORVASTATIN CALCIUM 40 MG: 40 TABLET, FILM COATED ORAL at 17:31

## 2024-12-14 NOTE — RESPIRATORY THERAPY NOTE
RT Protocol Note  Leander Saab 79 y.o. male MRN: [id number]  Unit/Bed#: -01 Encounter: [id number]    Assessment    Principal Problem:    Cystitis  Active Problems:    PAF (paroxysmal atrial fibrillation) (HCC)    Hypertension    Chronic combined systolic and diastolic congestive heart failure (HCC)    CAP (community acquired pneumonia)    Severe sepsis (HCC)    Stage 3a chronic kidney disease (HCC)      Home Pulmonary Medications:  Albuterol prn       Past Medical History:   Diagnosis Date    Dysphagia     Hypertension      Social History     Socioeconomic History    Marital status:      Spouse name: None    Number of children: None    Years of education: None    Highest education level: None   Occupational History    None   Tobacco Use    Smoking status: Never    Smokeless tobacco: Never   Vaping Use    Vaping status: Never Used   Substance and Sexual Activity    Alcohol use: Yes     Alcohol/week: 1.0 standard drink of alcohol     Types: 1 Glasses of wine per week     Comment: daily 2oz of wine    Drug use: Never    Sexual activity: None   Other Topics Concern    None   Social History Narrative    None     Social Drivers of Health     Financial Resource Strain: Not on file   Food Insecurity: No Food Insecurity (12/13/2024)    Nursing - Inadequate Food Risk Classification     Worried About Running Out of Food in the Last Year: Not on file     Ran Out of Food in the Last Year: Not on file     Ran Out of Food in the Last Year: 1   Transportation Needs: No Transportation Needs (12/13/2024)    Nursing - Transportation Risk Classification     Lack of Transportation: Not on file     Lack of Transportation: 2   Physical Activity: Not on file   Stress: Not on file   Social Connections: Not on file   Intimate Partner Violence: Unknown (12/13/2024)    Nursing IPS     Feels Physically and Emotionally Safe: Not on file     Physically Hurt by Someone: Not on file     Humiliated or Emotionally Abused by Someone:  "Not on file     Physically Hurt by Someone: 2     Hurt or Threatened by Someone: 2   Housing Stability: Unknown (2024)    Nursing: Inadequate Housing Risk Classification     Has Housing: Not on file     Worried About Losing Housing: Not on file     Unable to Get Utilities: Not on file     Unable to Pay for Housing in the Last Year: 2     Has Housin       Subjective         Objective    Physical Exam:   Assessment Type: (P) Assess only  General Appearance: (P) Alert, Awake  Respiratory Pattern: (P) Normal  Chest Assessment: (P) Chest expansion symmetrical  Bilateral Breath Sounds: (P) Clear, Diminished  Cough: (P) None  O2 Device: (P) RA    Vitals:  Blood pressure 100/61, pulse 93, temperature 98.2 °F (36.8 °C), temperature source Oral, resp. rate 20, height 5' 7\" (1.702 m), weight 81.6 kg (179 lb 14.4 oz), SpO2 92%.          Imaging and other studies:     O2 Device: (P) RA     Plan       Airway Clearance Plan: (P) Incentive Spirometer     Resp Comments: (P) plan to order IS as per acp   "

## 2024-12-14 NOTE — PROGRESS NOTES
Progress Note - Hospitalist   Name: Leander Saab 79 y.o. male I MRN: [id number]  Unit/Bed#: -01 I Date of Admission: 12/13/2024   Date of Service: 12/14/2024 I Hospital Day: 1    Assessment & Plan  Cystitis  Presenting with chills and fatigue  UA with 4-10 WBCs and innumerable bacteria  Continue ceftriaxone day 1  urine culture pending  CAP (community acquired pneumonia)  CT A/P obtained on admit concerning for bilateral lower lobe pneumonia  Patient does endorse cough  Continue ceftriaxone and doxycycline day 1  Urine strep and Legionella studies negative  Sputum culture and Gram stain respiratory protocol  Start Mucinex  Severe sepsis (Formerly [facility])  SIRS criteria: Leukocytosis and tachycardia  Source of infection not determined until 2144 when UA resulted, antibiotics were started shortly afterwards  Does meet severe sepsis criteria with elevated T. bili  Procalcitonin 1.07->downward trending  blood cultures pending  Stage 3a chronic kidney disease (Formerly [facility])  Lab Results   Component Value Date    EGFR 48 12/14/2024    EGFR 47 12/13/2024    EGFR 60 06/03/2024    CREATININE 1.35 (H) 12/14/2024    CREATININE 1.39 (H) 12/13/2024    CREATININE 1.13 06/03/2024   Creatinine 1.0-1.2  Creatinine admission 1.39  Patient does examine slightly hypovolemic on admit,   Continue IVF  Urinary retention protocol  Bladder scans  Avoid nephrotoxic agents and hypotension  Trend BMP daily  Slight improvement   PAF (paroxysmal atrial fibrillation) (Formerly [facility])  RC: Metoprolol succinate 100 Mg twice daily  AC: Eliquis 5 Mg twice daily  Initially tachycardic in the ED, however responded well to IVF  Continue home medications  Hypertension  Home regimen: Metoprolol succinate 100 Mg twice daily, continue  Chronic combined systolic and diastolic congestive heart failure (HCC)  Wt Readings from Last 3 Encounters:   12/14/24 81.6 kg (179 lb 14.4 oz)   06/01/24 84 kg (185 lb 3.2 oz)   05/29/24 83.9 kg (185 lb)   Last echo 6/2024 with LVEF of 35%  Not  maintained on diuretic outpatient  Examines hypovolemic on admit  Will place on gentle IVF overnight in setting of acute infection and improvement in heart rate with fluids earlier  I/os and Daily weights    VTE Pharmacologic Prophylaxis: VTE Score: 6 High Risk (Score >/= 5) - Pharmacological DVT Prophylaxis Ordered: apixaban (Eliquis). Sequential Compression Devices Ordered.    Mobility:   Basic Mobility Inpatient Raw Score: 18  JH-HLM Goal: 6: Walk 10 steps or more  JH-HLM Achieved: 5: Stand (1 or more minutes)  JH-HLM Goal NOT achieved. Continue with multidisciplinary rounding and encourage appropriate mobility to improve upon JH-HLM goals.    Patient Centered Rounds: I performed bedside rounds with nursing staff today.   Discussions with Specialists or Other Care Team Provider: CM    Education and Discussions with Family / Patient: Patient declined call to .     Current Length of Stay: 1 day(s)  Current Patient Status: Inpatient   Certification Statement: The patient will continue to require additional inpatient hospital stay due to sepsis  Discharge Plan: Anticipate discharge in 48-72 hrs to home.    Code Status: Level 1 - Full Code    Aretha Tabor was seen and examined at bedside.  No acute events overnight.  Discussed plan of care.  All questions and concerns were answered and addressed.  Has no acute complaints at this time.  States that he overall feels improved.  Creatinine slightly improved we will continue IV fluids and IV antibiotics to cover pneumonia and UTI.    Objective :  Temp:  [97.5 °F (36.4 °C)-98.1 °F (36.7 °C)] 98.1 °F (36.7 °C)  HR:  [] 84  BP: ()/(55-81) 100/66  Resp:  [18-20] 20  SpO2:  [91 %-99 %] 92 %  O2 Device: None (Room air)    Body mass index is 28.18 kg/m².     Input and Output Summary (last 24 hours):     Intake/Output Summary (Last 24 hours) at 12/14/2024 0764  Last data filed at 12/14/2024 0431  Gross per 24 hour   Intake 670 ml   Output 250 ml    Net 420 ml       Physical Exam  Vitals and nursing note reviewed.   Constitutional:       General: He is not in acute distress.     Appearance: He is not ill-appearing.   HENT:      Head: Normocephalic and atraumatic.   Cardiovascular:      Rate and Rhythm: Normal rate. Rhythm irregular.      Pulses: Normal pulses.      Heart sounds: Normal heart sounds.   Pulmonary:      Effort: Pulmonary effort is normal.      Breath sounds: Normal breath sounds.   Abdominal:      General: Abdomen is flat. Bowel sounds are normal.      Palpations: Abdomen is soft.   Musculoskeletal:      Right lower leg: No edema.      Left lower leg: No edema.   Skin:     General: Skin is warm.   Neurological:      General: No focal deficit present.      Mental Status: He is alert and oriented to person, place, and time.           Lines/Drains:              Lab Results: I have reviewed the following results:   Results from last 7 days   Lab Units 12/14/24  0346 12/13/24  1602   WBC Thousand/uL 12.16* 15.43*   HEMOGLOBIN g/dL 12.5 13.6   HEMATOCRIT % 38.4 41.9   PLATELETS Thousands/uL 153 164   SEGS PCT %  --  75   LYMPHO PCT %  --  16   MONO PCT %  --  7   EOS PCT %  --  1     Results from last 7 days   Lab Units 12/14/24  0346 12/13/24  1602   SODIUM mmol/L 134* 132*   POTASSIUM mmol/L 3.9 3.8   CHLORIDE mmol/L 104 104   CO2 mmol/L 21 20*   BUN mg/dL 24 20   CREATININE mg/dL 1.35* 1.39*   ANION GAP mmol/L 9 8   CALCIUM mg/dL 8.0* 8.5   ALBUMIN g/dL  --  3.6   TOTAL BILIRUBIN mg/dL  --  3.13*   ALK PHOS U/L  --  65   ALT U/L  --  15   AST U/L  --  14   GLUCOSE RANDOM mg/dL 116 126     Results from last 7 days   Lab Units 12/13/24  1718   INR  1.33*             Results from last 7 days   Lab Units 12/14/24  0346 12/13/24  1718 12/13/24  1602   LACTIC ACID mmol/L  --  1.3  --    PROCALCITONIN ng/ml 0.97*  --  1.07*       Recent Cultures (last 7 days):   Results from last 7 days   Lab Units 12/13/24  1718   BLOOD CULTURE  Received in Microbiology  Lab. Culture in Progress.  Received in Microbiology Lab. Culture in Progress.       Imaging Results Review: No pertinent imaging studies reviewed.  Other Study Results Review: No additional pertinent studies reviewed.    Last 24 Hours Medication List:     Current Facility-Administered Medications:     acetaminophen (TYLENOL) tablet 650 mg, Q6H PRN    apixaban (ELIQUIS) tablet 5 mg, BID    aspirin (ECOTRIN LOW STRENGTH) EC tablet 81 mg, Daily    atorvastatin (LIPITOR) tablet 40 mg, Daily With Dinner    cefTRIAXone (ROCEPHIN) IVPB (premix in dextrose) 1,000 mg 50 mL, Q24H, Last Rate: 1,000 mg (12/13/24 2251)    Cholecalciferol (VITAMIN D3) tablet 400 Units, Daily    doxycycline hyclate (VIBRAMYCIN) capsule 100 mg, Q12H AVELINA    fluticasone (FLONASE) 50 mcg/act nasal spray 1 spray, BID    guaiFENesin (MUCINEX) 12 hr tablet 600 mg, BID    levothyroxine tablet 88 mcg, HS    metoprolol tartrate (LOPRESSOR) tablet 100 mg, Q12H AVELINA    multi-electrolyte (PLASMALYTE-A/ISOLYTE-S PH 7.4) IV solution, Continuous, Last Rate: 50 mL/hr (12/13/24 0531)    umeclidinium 62.5 mcg/actuation inhaler AEPB 1 puff, Daily **AND** olodaterol HCl (STRIVERDI RESPIMAT) inhaler 2 puff, Daily    pantoprazole (PROTONIX) EC tablet 40 mg, Early Morning    senna (SENOKOT) tablet 8.6 mg, HS PRN    tamsulosin (FLOMAX) capsule 0.8 mg, HS    Administrative Statements   Today, Patient Was Seen By: Aisha Bone MD  I have spent a total time of 53 minutes in caring for this patient on the day of the visit/encounter including Diagnostic results, Prognosis, Risks and benefits of tx options, Instructions for management, Patient and family education, Importance of tx compliance, Risk factor reductions, Impressions, Counseling / Coordination of care, Documenting in the medical record, Reviewing / ordering tests, medicine, procedures  , Obtaining or reviewing history  , and Communicating with other healthcare professionals .    **Please Note: This note may have  been constructed using a voice recognition system.**

## 2024-12-14 NOTE — ASSESSMENT & PLAN NOTE
Wt Readings from Last 3 Encounters:   12/13/24 81.4 kg (179 lb 8 oz)   06/01/24 84 kg (185 lb 3.2 oz)   05/29/24 83.9 kg (185 lb)   Last echo 6/2024 with LVEF of 35%  Not maintained on diuretic outpatient  Examines hypovolemic on admit  Will place on gentle IVF overnight in setting of acute infection and improvement in heart rate with fluids earlier  I/os and Daily weights

## 2024-12-14 NOTE — H&P
H&P - Hospitalist   Name: Leander Saab 79 y.o. male I MRN: [id number]  Unit/Bed#: -01 I Date of Admission: 12/13/2024   Date of Service: 12/14/2024 I Hospital Day: 1     Assessment & Plan  Cystitis  Presenting with chills and fatigue  UA with 4-10 WBCs and innumerable bacteria  Will start ceftriaxone and send urine for culture  CAP (community acquired pneumonia)  CT A/P obtained on admit concerning for bilateral lower lobe pneumonia  Patient does endorse cough  Will start ceftriaxone and doxycycline  Urine strep and Legionella studies  Sputum culture and Gram stain respiratory protocol  Start Mucinex  Severe sepsis (Formerly [facility])  SIRS criteria: Leukocytosis and tachycardia  Source of infection not determined until 2144 when UA resulted, antibiotics were started shortly afterwards  Does meet severe sepsis criteria with elevated T. bili  Procalcitonin 1.07-trend daily  Follow-up blood cultures  Follow-up urine culture  Stage 3a chronic kidney disease (Formerly [facility])  Lab Results   Component Value Date    EGFR 47 12/13/2024    EGFR 60 06/03/2024    EGFR 64 06/02/2024    CREATININE 1.39 (H) 12/13/2024    CREATININE 1.13 06/03/2024    CREATININE 1.07 06/02/2024   Creatinine 1.0-1.2  Creatinine admission 1.39  Patient does examine slightly hypovolemic on admit, therefore will place on gentle IVF overnight  Trend BMP daily  PAF (paroxysmal atrial fibrillation) (Former[facility])  RC: Metoprolol succinate 100 Mg twice daily  AC: Eliquis 5 Mg twice daily  Initially tachycardic in the ED, however responded well to IVF  Continue home medications  Hypertension  Home regimen: Metoprolol succinate 100 Mg twice daily, continue  Chronic combined systolic and diastolic congestive heart failure (HCC)  Wt Readings from Last 3 Encounters:   12/13/24 81.4 kg (179 lb 8 oz)   06/01/24 84 kg (185 lb 3.2 oz)   05/29/24 83.9 kg (185 lb)   Last echo 6/2024 with LVEF of 35%  Not maintained on diuretic outpatient  Examines hypovolemic on admit  Will place on gentle  "IVF overnight in setting of acute infection and improvement in heart rate with fluids earlier  I/os and Daily weights      VTE Pharmacologic Prophylaxis: VTE Score: 6 High Risk (Score >/= 5) - Pharmacological DVT Prophylaxis Ordered: apixaban (Eliquis). Sequential Compression Devices Ordered.  Code Status: Level 1 - Full Code   Discussion with family: Updated  (significant other) at bedside.    Anticipated Length of Stay: Patient will be admitted on an inpatient basis with an anticipated length of stay of greater than 2 midnights secondary to cystitis, CAP, severe sepsis.    History of Present Illness   Chief Complaint: \" I have been tired, drowsy, and short of breath with abdominal pain\"    Leander Saab is a 79 y.o. male with a PMH of systolic HF, HTN, PAF, and CKD stage III who presents with fatigue and abdominal pain since Wednesday.  Patient reports he had an EGD on Wednesday.  Since then he endorses left upper quadrant abdominal pain with associated anorexia.  Pain is worse with deep breaths and coughing.  No nausea or vomiting.  Normal bowel movements.  Reports he has intermittent chills since Wednesday as well.  Mild dyspnea.  No chest pain.  Reports nonproductive cough.  No fever.  Was in his normal state of health prior to Wednesday.  Denies any urinary symptoms.    Review of Systems   Constitutional:  Positive for appetite change, chills and fatigue. Negative for fever.   HENT:  Negative for congestion.    Respiratory:  Positive for cough and shortness of breath.    Cardiovascular:  Negative for chest pain and leg swelling.   Gastrointestinal:  Positive for abdominal pain. Negative for constipation, diarrhea, nausea and vomiting.   Genitourinary:  Negative for difficulty urinating, dysuria and hematuria.   Musculoskeletal:  Negative for gait problem.   Neurological:  Negative for weakness and numbness.   All other systems reviewed and are negative.      Historical Information   Past Medical " History:   Diagnosis Date    Dysphagia     Hypertension      Past Surgical History:   Procedure Laterality Date    CHOLECYSTECTOMY LAPAROSCOPIC N/A 2/8/2021    Procedure: CHOLECYSTECTOMY LAPAROSCOPIC;  Surgeon: Wally Young MD;  Location:  MAIN OR;  Service: General    CORONARY STENT PLACEMENT       Social History     Tobacco Use    Smoking status: Never    Smokeless tobacco: Never   Vaping Use    Vaping status: Never Used   Substance and Sexual Activity    Alcohol use: Yes     Alcohol/week: 1.0 standard drink of alcohol     Types: 1 Glasses of wine per week     Comment: daily 2oz of wine    Drug use: Never    Sexual activity: Not on file     E-Cigarette/Vaping    E-Cigarette Use Never User      E-Cigarette/Vaping Substances    Nicotine No     THC No     CBD No     Flavoring No     Other No     Unknown No      History reviewed. No pertinent family history.  Social History:  Marital Status:    Occupation: Retired  Patient Pre-hospital Living Situation: Home with significant other  Patient Pre-hospital Level of Mobility: walks  Patient Pre-hospital Diet Restrictions: None    Meds/Allergies   I have reviewed home medications with patient personally.  Prior to Admission medications    Medication Sig Start Date End Date Taking? Authorizing Provider   apixaban (ELIQUIS) 5 mg Take 1 tablet (5 mg total) by mouth 2 (two) times a day 2/11/21 12/13/24 Yes Chester Chen MD   aspirin (ECOTRIN LOW STRENGTH) 81 mg EC tablet Take 81 mg by mouth daily   Yes Historical Provider, MD   cholecalciferol (VITAMIN D3) 400 units tablet Take 400 Units by mouth daily   Yes Historical Provider, MD   fluticasone (FLONASE) 50 mcg/act nasal spray 1 spray into each nostril 2 (two) times a day 11/25/24  Yes Historical Provider, MD   levothyroxine 88 mcg tablet Take 88 mcg by mouth daily at bedtime 8/30/24  Yes Historical Provider, MD   losartan (COZAAR) 25 mg tablet Take 25 mg by mouth daily 4/30/24  Yes Historical Provider, MD    metoprolol tartrate 75 MG TABS Take 1 tablet (75 mg total) by mouth every 12 (twelve) hours  Patient taking differently: Take 100 mg by mouth every 12 (twelve) hours 2/11/21 12/13/24 Yes Chester Chen MD   Multiple Vitamin (MULTIVITAMIN ADULT PO) Take by mouth 8/30/24  Yes Historical Provider, MD   pantoprazole (PROTONIX) 40 mg tablet Take 1 tablet by mouth daily 10/9/24  Yes Historical Provider, MD   rosuvastatin (CRESTOR) 20 MG tablet Take 20 mg by mouth daily 8/30/24  Yes Historical Provider, MD   tamsulosin (FLOMAX) 0.4 mg Take 0.8 mg by mouth daily at bedtime 10/9/24  Yes Historical Provider, MD   tiotropium-olodaterol (STIOLTO RESPIMAT) 2.5-2.5 MCG/ACT inhaler Inhale 2 puffs daily 10/9/24  Yes Historical Provider, MD   omeprazole (PriLOSEC) 20 mg delayed release capsule Take 20 mg by mouth daily  12/13/24 Yes Historical Provider, MD   dicyclomine (BENTYL) 20 mg tablet Take 1 tablet (20 mg total) by mouth every 6 (six) hours as needed (abd pain) 5/29/24   Nereida Hanson,      Allergies   Allergen Reactions    Pollen Extract        Objective :  Temp:  [97.5 °F (36.4 °C)-97.6 °F (36.4 °C)] 97.6 °F (36.4 °C)  HR:  [] 105  BP: ()/(55-81) 120/69  Resp:  [18-20] 20  SpO2:  [91 %-99 %] 93 %  O2 Device: None (Room air)    Physical Exam  Vitals and nursing note reviewed.   Constitutional:       General: He is not in acute distress.     Appearance: Normal appearance. He is not ill-appearing.      Comments: Pleasant and conversational   HENT:      Head: Normocephalic.      Nose: Nose normal.      Mouth/Throat:      Mouth: Mucous membranes are dry.   Eyes:      Conjunctiva/sclera: Conjunctivae normal.   Cardiovascular:      Rate and Rhythm: Tachycardia present. Rhythm irregular.      Pulses: Normal pulses.   Pulmonary:      Effort: Pulmonary effort is normal. No respiratory distress.      Breath sounds: Normal breath sounds. No wheezing, rhonchi or rales.   Abdominal:      General: Abdomen is flat.  "There is no distension.      Palpations: Abdomen is soft.      Tenderness: There is no guarding or rebound.   Musculoskeletal:         General: Normal range of motion.      Cervical back: Normal range of motion.      Right lower leg: No edema.      Left lower leg: No edema.   Skin:     General: Skin is warm and dry.      Coloration: Skin is not pale.   Neurological:      General: No focal deficit present.      Mental Status: He is alert and oriented to person, place, and time.   Psychiatric:         Mood and Affect: Mood normal.         Thought Content: Thought content normal.          Lines/Drains:            Lab Results: I have reviewed the following results:  Results from last 7 days   Lab Units 12/13/24  1602   WBC Thousand/uL 15.43*   HEMOGLOBIN g/dL 13.6   HEMATOCRIT % 41.9   PLATELETS Thousands/uL 164   SEGS PCT % 75   LYMPHO PCT % 16   MONO PCT % 7   EOS PCT % 1     Results from last 7 days   Lab Units 12/13/24  1602   SODIUM mmol/L 132*   POTASSIUM mmol/L 3.8   CHLORIDE mmol/L 104   CO2 mmol/L 20*   BUN mg/dL 20   CREATININE mg/dL 1.39*   ANION GAP mmol/L 8   CALCIUM mg/dL 8.5   ALBUMIN g/dL 3.6   TOTAL BILIRUBIN mg/dL 3.13*   ALK PHOS U/L 65   ALT U/L 15   AST U/L 14   GLUCOSE RANDOM mg/dL 126     Results from last 7 days   Lab Units 12/13/24  1718   INR  1.33*         No results found for: \"HGBA1C\"  Results from last 7 days   Lab Units 12/13/24  1718 12/13/24  1602   LACTIC ACID mmol/L 1.3  --    PROCALCITONIN ng/ml  --  1.07*       Imaging Results Review: I reviewed radiology reports from this admission including: CT abdomen/pelvis.  Other Study Results Review: EKG was personally reviewed and my interpretation is: A-fib with RVR.  Right bundle branch block...    Administrative Statements       ** Please Note: This note has been constructed using a voice recognition system. **    "

## 2024-12-14 NOTE — ASSESSMENT & PLAN NOTE
Lab Results   Component Value Date    EGFR 48 12/14/2024    EGFR 47 12/13/2024    EGFR 60 06/03/2024    CREATININE 1.35 (H) 12/14/2024    CREATININE 1.39 (H) 12/13/2024    CREATININE 1.13 06/03/2024   Creatinine 1.0-1.2  Creatinine admission 1.39  Patient does examine slightly hypovolemic on admit,   Continue IVF  Urinary retention protocol  Bladder scans  Avoid nephrotoxic agents and hypotension  Trend BMP daily  Slight improvement

## 2024-12-14 NOTE — PLAN OF CARE
Problem: Potential for Falls  Goal: Patient will remain free of falls  Description: INTERVENTIONS:  - Educate patient/family on patient safety including physical limitations  - Instruct patient to call for assistance with activity   - Consult OT/PT to assist with strengthening/mobility   - Keep Call bell within reach  - Keep bed low and locked with side rails adjusted as appropriate  - Keep care items and personal belongings within reach  - Initiate and maintain comfort rounds  - Make Fall Risk Sign visible to staff  - Offer Toileting every x Hours, in advance of need  - Initiate/Maintain xalarm  - Obtain necessary fall risk management equipment: x  - Apply yellow socks and bracelet for high fall risk patients  - Consider moving patient to room near nurses station  12/13/2024 2216 by Nanci Persaud RN  Outcome: Progressing  12/13/2024 2216 by Nanci Persaud RN  Outcome: Progressing     Problem: PAIN - ADULT  Goal: Verbalizes/displays adequate comfort level or baseline comfort level  Description: Interventions:  - Encourage patient to monitor pain and request assistance  - Assess pain using appropriate pain scale  - Administer analgesics based on type and severity of pain and evaluate response  - Implement non-pharmacological measures as appropriate and evaluate response  - Consider cultural and social influences on pain and pain management  - Notify physician/advanced practitioner if interventions unsuccessful or patient reports new pain  Outcome: Progressing     Problem: INFECTION - ADULT  Goal: Absence or prevention of progression during hospitalization  Description: INTERVENTIONS:  - Assess and monitor for signs and symptoms of infection  - Monitor lab/diagnostic results  - Monitor all insertion sites, i.e. indwelling lines, tubes, and drains  - Monitor endotracheal if appropriate and nasal secretions for changes in amount and color  - [place] appropriate cooling/warming therapies per order  - Administer  medications as ordered  - Instruct and encourage patient and family to use good hand hygiene technique  - Identify and instruct in appropriate isolation precautions for identified infection/condition  Outcome: Progressing  Goal: Absence of fever/infection during neutropenic period  Description: INTERVENTIONS:  - Monitor WBC    Outcome: Progressing     Problem: SAFETY ADULT  Goal: Patient will remain free of falls  Description: INTERVENTIONS:  - Educate patient/family on patient safety including physical limitations  - Instruct patient to call for assistance with activity   - Consult OT/PT to assist with strengthening/mobility   - Keep Call bell within reach  - Keep bed low and locked with side rails adjusted as appropriate  - Keep care items and personal belongings within reach  - Initiate and maintain comfort rounds  - Make Fall Risk Sign visible to staff  - Offer Toileting every x Hours, in advance of need  - Initiate/Maintain xalarm  - Obtain necessary fall risk management equipment: x  - Apply yellow socks and bracelet for high fall risk patients  - Consider moving patient to room near nurses station  12/13/2024 2216 by Nanci Persaud RN  Outcome: Progressing  12/13/2024 2216 by Nanci Persaud RN  Outcome: Progressing  Goal: Maintain or return to baseline ADL function  Description: INTERVENTIONS:  -  Assess patient's ability to carry out ADLs; assess patient's baseline for ADL function and identify physical deficits which impact ability to perform ADLs (bathing, care of mouth/teeth, toileting, grooming, dressing, etc.)  - Assess/evaluate cause of self-care deficits   - Assess range of motion  - Assess patient's mobility; develop plan if impaired  - Assess patient's need for assistive devices and provide as appropriate  - Encourage maximum independence but intervene and supervise when necessary  - Involve family in performance of ADLs  - Assess for home care needs following discharge   - Consider OT consult to assist  with ADL evaluation and planning for discharge  - Provide patient education as appropriate  Outcome: Progressing  Goal: Maintains/Returns to pre admission functional level  Description: INTERVENTIONS:  - Perform AM-PAC 6 Click Basic Mobility/ Daily Activity assessment daily.  - Set and communicate daily mobility goal to care team and patient/family/caregiver.   - Collaborate with rehabilitation services on mobility goals if consulted  - Perform Range of Motion x times a day.  - Reposition patient every x hours.  - Dangle patient x times a day  - Stand patient x times a day  - Ambulate patient x times a day  - Out of bed to chair x times a day   - Out of bed for meals x times a day  - Out of bed for toileting  - Record patient progress and toleration of activity level   Outcome: Progressing     Problem: DISCHARGE PLANNING  Goal: Discharge to home or other facility with appropriate resources  Description: INTERVENTIONS:  - Identify barriers to discharge w/patient and caregiver  - Arrange for needed discharge resources and transportation as appropriate  - Identify discharge learning needs (meds, wound care, etc.)  - Arrange for interpretive services to assist at discharge as needed  - Refer to Case Management Department for coordinating discharge planning if the patient needs post-hospital services based on physician/advanced practitioner order or complex needs related to functional status, cognitive ability, or social support system  Outcome: Progressing     Problem: Knowledge Deficit  Goal: Patient/family/caregiver demonstrates understanding of disease process, treatment plan, medications, and discharge instructions  Description: Complete learning assessment and assess knowledge base.  Interventions:  - Provide teaching at level of understanding  - Provide teaching via preferred learning methods  Outcome: Progressing

## 2024-12-14 NOTE — ASSESSMENT & PLAN NOTE
Lab Results   Component Value Date    EGFR 47 12/13/2024    EGFR 60 06/03/2024    EGFR 64 06/02/2024    CREATININE 1.39 (H) 12/13/2024    CREATININE 1.13 06/03/2024    CREATININE 1.07 06/02/2024   Creatinine 1.0-1.2  Creatinine admission 1.39  Patient does examine slightly hypovolemic on admit, therefore will place on gentle IVF overnight  Trend BMP daily

## 2024-12-14 NOTE — ASSESSMENT & PLAN NOTE
SIRS criteria: Leukocytosis and tachycardia  Source of infection not determined until 2144 when UA resulted, antibiotics were started shortly afterwards  Does meet severe sepsis criteria with elevated T. bili  Procalcitonin 1.07->downward trending  blood cultures pending

## 2024-12-14 NOTE — ASSESSMENT & PLAN NOTE
CT A/P obtained on admit concerning for bilateral lower lobe pneumonia  Patient does endorse cough  Continue ceftriaxone and doxycycline day 1  Urine strep and Legionella studies negative  Sputum culture and Gram stain respiratory protocol  Start Mucinex

## 2024-12-14 NOTE — ASSESSMENT & PLAN NOTE
CT A/P obtained on admit concerning for bilateral lower lobe pneumonia  Patient does endorse cough  Will start ceftriaxone and doxycycline  Urine strep and Legionella studies  Sputum culture and Gram stain respiratory protocol  Start Mucinex

## 2024-12-14 NOTE — ASSESSMENT & PLAN NOTE
Presenting with chills and fatigue  UA with 4-10 WBCs and innumerable bacteria  Will start ceftriaxone and send urine for culture

## 2024-12-14 NOTE — ASSESSMENT & PLAN NOTE
Wt Readings from Last 3 Encounters:   12/14/24 81.6 kg (179 lb 14.4 oz)   06/01/24 84 kg (185 lb 3.2 oz)   05/29/24 83.9 kg (185 lb)   Last echo 6/2024 with LVEF of 35%  Not maintained on diuretic outpatient  Examines hypovolemic on admit  Will place on gentle IVF overnight in setting of acute infection and improvement in heart rate with fluids earlier  I/os and Daily weights

## 2024-12-14 NOTE — ASSESSMENT & PLAN NOTE
RC: Metoprolol succinate 100 Mg twice daily  AC: Eliquis 5 Mg twice daily  Initially tachycardic in the ED, however responded well to IVF  Continue home medications

## 2024-12-14 NOTE — ASSESSMENT & PLAN NOTE
SIRS criteria: Leukocytosis and tachycardia  Source of infection not determined until 2144 when UA resulted, antibiotics were started shortly afterwards  Does meet severe sepsis criteria with elevated T. bili  Procalcitonin 1.07-trend daily  Follow-up blood cultures  Follow-up urine culture

## 2024-12-14 NOTE — ASSESSMENT & PLAN NOTE
Presenting with chills and fatigue  UA with 4-10 WBCs and innumerable bacteria  Continue ceftriaxone day 1  urine culture pending

## 2024-12-15 LAB
ANION GAP SERPL CALCULATED.3IONS-SCNC: 9 MMOL/L (ref 4–13)
BACTERIA UR CULT: NORMAL
BUN SERPL-MCNC: 26 MG/DL (ref 5–25)
CALCIUM SERPL-MCNC: 8.2 MG/DL (ref 8.4–10.2)
CHLORIDE SERPL-SCNC: 105 MMOL/L (ref 96–108)
CO2 SERPL-SCNC: 20 MMOL/L (ref 21–32)
CREAT SERPL-MCNC: 1.14 MG/DL (ref 0.6–1.3)
ERYTHROCYTE [DISTWIDTH] IN BLOOD BY AUTOMATED COUNT: 13.1 % (ref 11.6–15.1)
GFR SERPL CREATININE-BSD FRML MDRD: 59 ML/MIN/1.73SQ M
GLUCOSE SERPL-MCNC: 113 MG/DL (ref 65–140)
HCT VFR BLD AUTO: 37.9 % (ref 36.5–49.3)
HGB BLD-MCNC: 12.4 G/DL (ref 12–17)
MCH RBC QN AUTO: 33.2 PG (ref 26.8–34.3)
MCHC RBC AUTO-ENTMCNC: 32.7 G/DL (ref 31.4–37.4)
MCV RBC AUTO: 102 FL (ref 82–98)
PLATELET # BLD AUTO: 182 THOUSANDS/UL (ref 149–390)
PMV BLD AUTO: 10.2 FL (ref 8.9–12.7)
POTASSIUM SERPL-SCNC: 4.1 MMOL/L (ref 3.5–5.3)
RBC # BLD AUTO: 3.73 MILLION/UL (ref 3.88–5.62)
SODIUM SERPL-SCNC: 134 MMOL/L (ref 135–147)
WBC # BLD AUTO: 9.17 THOUSAND/UL (ref 4.31–10.16)

## 2024-12-15 PROCEDURE — 80048 BASIC METABOLIC PNL TOTAL CA: CPT | Performed by: STUDENT IN AN ORGANIZED HEALTH CARE EDUCATION/TRAINING PROGRAM

## 2024-12-15 PROCEDURE — 85027 COMPLETE CBC AUTOMATED: CPT | Performed by: STUDENT IN AN ORGANIZED HEALTH CARE EDUCATION/TRAINING PROGRAM

## 2024-12-15 PROCEDURE — 99233 SBSQ HOSP IP/OBS HIGH 50: CPT | Performed by: STUDENT IN AN ORGANIZED HEALTH CARE EDUCATION/TRAINING PROGRAM

## 2024-12-15 RX ADMIN — CHOLECALCIFEROL (VITAMIN D3) 10 MCG (400 UNIT) TABLET 400 UNITS: at 10:01

## 2024-12-15 RX ADMIN — GUAIFENESIN 600 MG: 600 TABLET ORAL at 17:36

## 2024-12-15 RX ADMIN — APIXABAN 5 MG: 5 TABLET, FILM COATED ORAL at 17:36

## 2024-12-15 RX ADMIN — ATORVASTATIN CALCIUM 40 MG: 40 TABLET, FILM COATED ORAL at 15:48

## 2024-12-15 RX ADMIN — METOPROLOL TARTRATE 100 MG: 50 TABLET, FILM COATED ORAL at 10:01

## 2024-12-15 RX ADMIN — FLUTICASONE PROPIONATE 1 SPRAY: 50 SPRAY, METERED NASAL at 10:03

## 2024-12-15 RX ADMIN — TAMSULOSIN HYDROCHLORIDE 0.8 MG: 0.4 CAPSULE ORAL at 22:13

## 2024-12-15 RX ADMIN — ACETAMINOPHEN 650 MG: 325 TABLET, FILM COATED ORAL at 22:13

## 2024-12-15 RX ADMIN — LEVOTHYROXINE SODIUM 88 MCG: 88 TABLET ORAL at 22:13

## 2024-12-15 RX ADMIN — ASPIRIN 81 MG: 81 TABLET, COATED ORAL at 10:01

## 2024-12-15 RX ADMIN — FLUTICASONE PROPIONATE 1 SPRAY: 50 SPRAY, METERED NASAL at 22:13

## 2024-12-15 RX ADMIN — PANTOPRAZOLE SODIUM 40 MG: 40 TABLET, DELAYED RELEASE ORAL at 05:48

## 2024-12-15 RX ADMIN — UMECLIDINIUM 1 PUFF: 62.5 AEROSOL, POWDER ORAL at 10:03

## 2024-12-15 RX ADMIN — APIXABAN 5 MG: 5 TABLET, FILM COATED ORAL at 10:01

## 2024-12-15 RX ADMIN — METOPROLOL TARTRATE 100 MG: 50 TABLET, FILM COATED ORAL at 22:13

## 2024-12-15 RX ADMIN — DOXYCYCLINE 100 MG: 100 CAPSULE ORAL at 10:01

## 2024-12-15 RX ADMIN — DOXYCYCLINE 100 MG: 100 CAPSULE ORAL at 22:13

## 2024-12-15 RX ADMIN — CEFTRIAXONE 1000 MG: 1 INJECTION, SOLUTION INTRAVENOUS at 22:13

## 2024-12-15 RX ADMIN — OLODATEROL RESPIMAT INHALATION SPRAY 2 PUFF: 2.5 SPRAY, METERED RESPIRATORY (INHALATION) at 10:03

## 2024-12-15 RX ADMIN — GUAIFENESIN 600 MG: 600 TABLET ORAL at 10:01

## 2024-12-15 NOTE — ASSESSMENT & PLAN NOTE
Lab Results   Component Value Date    EGFR 59 12/15/2024    EGFR 48 12/14/2024    EGFR 47 12/13/2024    CREATININE 1.14 12/15/2024    CREATININE 1.35 (H) 12/14/2024    CREATININE 1.39 (H) 12/13/2024   Creatinine 1.0-1.2  Creatinine admission 1.39  Patient does examine slightly hypovolemic on admit,   dc'd IVF  Urinary retention protocol  Bladder scans  Avoid nephrotoxic agents and hypotension  Trend BMP daily  Back to baseline

## 2024-12-15 NOTE — ASSESSMENT & PLAN NOTE
CT A/P obtained on admit concerning for bilateral lower lobe pneumonia  Patient does endorse cough  Continue ceftriaxone and doxycycline day 2  Urine strep and Legionella studies negative  Sputum culture and Gram stain pending  Start Mucinex

## 2024-12-15 NOTE — PROGRESS NOTES
Progress Note - Hospitalist   Name: Leander Saab 79 y.o. male I MRN: [id number]  Unit/Bed#: -01 I Date of Admission: 12/13/2024   Date of Service: 12/15/2024 I Hospital Day: 2    Assessment & Plan  Cystitis  Presenting with chills and fatigue  UA with 4-10 WBCs and innumerable bacteria  Continue ceftriaxone day 2  urine culture negative  CAP (community acquired pneumonia)  CT A/P obtained on admit concerning for bilateral lower lobe pneumonia  Patient does endorse cough  Continue ceftriaxone and doxycycline day 2  Urine strep and Legionella studies negative  Sputum culture and Gram stain pending  Start Mucinex  Severe sepsis ([facility])  SIRS criteria: Leukocytosis and tachycardia  Source of infection not determined until 2144 when UA resulted, antibiotics were started shortly afterwards  Does meet severe sepsis criteria with elevated T. bili  Procalcitonin 1.07->downward trending  blood cultures NGTD @ 24h  Stage 3a chronic kidney disease (HCC)  Lab Results   Component Value Date    EGFR 59 12/15/2024    EGFR 48 12/14/2024    EGFR 47 12/13/2024    CREATININE 1.14 12/15/2024    CREATININE 1.35 (H) 12/14/2024    CREATININE 1.39 (H) 12/13/2024   Creatinine 1.0-1.2  Creatinine admission 1.39  Patient does examine slightly hypovolemic on admit,   dc'd IVF  Urinary retention protocol  Bladder scans  Avoid nephrotoxic agents and hypotension  Trend BMP daily  Back to baseline  PAF (paroxysmal atrial fibrillation) ([facility])  RC: Metoprolol succinate 100 Mg twice daily  AC: Eliquis 5 Mg twice daily  Initially tachycardic in the ED, however responded well to IVF  Continue home medications  Hypertension  Home regimen: Metoprolol succinate 100 Mg twice daily, continue  Chronic combined systolic and diastolic congestive heart failure (HCC)  Wt Readings from Last 3 Encounters:   12/15/24 82.6 kg (182 lb)   06/01/24 84 kg (185 lb 3.2 oz)   05/29/24 83.9 kg (185 lb)   Last echo 6/2024 with LVEF of 35%  Not maintained on diuretic  outpatient  Examines hypovolemic on admit  Will place on gentle IVF overnight in setting of acute infection and improvement in heart rate with fluids earlier  I/os and Daily weights    VTE Pharmacologic Prophylaxis: VTE Score: 6 High Risk (Score >/= 5) - Pharmacological DVT Prophylaxis Ordered: apixaban (Eliquis). Sequential Compression Devices Ordered.    Mobility:   Basic Mobility Inpatient Raw Score: 20  JH-HLM Goal: 6: Walk 10 steps or more  JH-HLM Achieved: 6: Walk 10 steps or more  JH-HLM Goal NOT achieved. Continue with multidisciplinary rounding and encourage appropriate mobility to improve upon JH-HLM goals.    Patient Centered Rounds: I performed bedside rounds with nursing staff today.   Discussions with Specialists or Other Care Team Provider: CM    Education and Discussions with Family / Patient: Patient declined call to .     Current Length of Stay: 2 day(s)  Current Patient Status: Inpatient   Certification Statement: The patient will continue to require additional inpatient hospital stay due to sepsis  Discharge Plan: Anticipate discharge tomorrow to home.    Code Status: Level 1 - Full Code    Aretha Tabor was seen and examined at bedside.  No acute events overnight.  Discussed plan of care.  All questions and concerns were answered and addressed.  Has no acute complaints at this time.  Feels much more improved today. UC negative sputum cx pending, BC ngtd continue abx.    Objective :  Temp:  [97.6 °F (36.4 °C)-98.7 °F (37.1 °C)] 97.6 °F (36.4 °C)  HR:  [92-93] 92  BP: (100-129)/(61-77) 129/77  Resp:  [19-20] 20  SpO2:  [92 %-96 %] 93 %  O2 Device: None (Room air)    Body mass index is 28.51 kg/m².     Input and Output Summary (last 24 hours):     Intake/Output Summary (Last 24 hours) at 12/15/2024 0734  Last data filed at 12/15/2024 0701  Gross per 24 hour   Intake 512 ml   Output 1050 ml   Net -538 ml       Physical Exam  Vitals and nursing note reviewed.   Constitutional:        General: He is not in acute distress.     Appearance: He is not ill-appearing.   HENT:      Head: Normocephalic and atraumatic.   Cardiovascular:      Rate and Rhythm: Normal rate. Rhythm irregular.      Pulses: Normal pulses.      Heart sounds: Normal heart sounds.   Pulmonary:      Effort: Pulmonary effort is normal.      Breath sounds: Normal breath sounds.   Abdominal:      General: Abdomen is flat. Bowel sounds are normal.      Palpations: Abdomen is soft.   Musculoskeletal:      Right lower leg: No edema.      Left lower leg: No edema.   Skin:     General: Skin is warm.   Neurological:      General: No focal deficit present.      Mental Status: He is alert and oriented to person, place, and time.           Lines/Drains:              Lab Results: I have reviewed the following results:   Results from last 7 days   Lab Units 12/15/24  0459 12/14/24  0346 12/13/24  1602   WBC Thousand/uL 9.17   < > 15.43*   HEMOGLOBIN g/dL 12.4   < > 13.6   HEMATOCRIT % 37.9   < > 41.9   PLATELETS Thousands/uL 182   < > 164   SEGS PCT %  --   --  75   LYMPHO PCT %  --   --  16   MONO PCT %  --   --  7   EOS PCT %  --   --  1    < > = values in this interval not displayed.     Results from last 7 days   Lab Units 12/15/24  0459 12/14/24  0346 12/13/24  1602   SODIUM mmol/L 134*   < > 132*   POTASSIUM mmol/L 4.1   < > 3.8   CHLORIDE mmol/L 105   < > 104   CO2 mmol/L 20*   < > 20*   BUN mg/dL 26*   < > 20   CREATININE mg/dL 1.14   < > 1.39*   ANION GAP mmol/L 9   < > 8   CALCIUM mg/dL 8.2*   < > 8.5   ALBUMIN g/dL  --   --  3.6   TOTAL BILIRUBIN mg/dL  --   --  3.13*   ALK PHOS U/L  --   --  65   ALT U/L  --   --  15   AST U/L  --   --  14   GLUCOSE RANDOM mg/dL 113   < > 126    < > = values in this interval not displayed.     Results from last 7 days   Lab Units 12/13/24  1718   INR  1.33*             Results from last 7 days   Lab Units 12/14/24 0346 12/13/24 1718 12/13/24  1602   LACTIC ACID mmol/L  --  1.3  --     PROCALCITONIN ng/ml 0.97*  --  1.07*       Recent Cultures (last 7 days):   Results from last 7 days   Lab Units 12/14/24  0645 12/14/24  0048 12/13/24  1718   BLOOD CULTURE   --   --  No Growth at 24 hrs.  No Growth at 24 hrs.   GRAM STAIN RESULT  Rare Epithelial cells per low power field*  2+ Polys*  1+ Gram negative rods*  Rare Gram positive cocci in pairs*  --   --    LEGIONELLA URINARY ANTIGEN   --  Negative  --        Imaging Results Review: No pertinent imaging studies reviewed.  Other Study Results Review: No additional pertinent studies reviewed.    Last 24 Hours Medication List:     Current Facility-Administered Medications:     acetaminophen (TYLENOL) tablet 650 mg, Q6H PRN    apixaban (ELIQUIS) tablet 5 mg, BID    aspirin (ECOTRIN LOW STRENGTH) EC tablet 81 mg, Daily    atorvastatin (LIPITOR) tablet 40 mg, Daily With Dinner    benzonatate (TESSALON PERLES) capsule 100 mg, TID PRN    cefTRIAXone (ROCEPHIN) IVPB (premix in dextrose) 1,000 mg 50 mL, Q24H, Last Rate: 1,000 mg (12/14/24 2100)    Cholecalciferol (VITAMIN D3) tablet 400 Units, Daily    doxycycline hyclate (VIBRAMYCIN) capsule 100 mg, Q12H AVELINA    fluticasone (FLONASE) 50 mcg/act nasal spray 1 spray, BID    guaiFENesin (MUCINEX) 12 hr tablet 600 mg, BID    levothyroxine tablet 88 mcg, HS    metoprolol tartrate (LOPRESSOR) tablet 100 mg, Q12H AVELINA    umeclidinium 62.5 mcg/actuation inhaler AEPB 1 puff, Daily **AND** olodaterol HCl (STRIVERDI RESPIMAT) inhaler 2 puff, Daily    pantoprazole (PROTONIX) EC tablet 40 mg, Early Morning    senna (SENOKOT) tablet 8.6 mg, HS PRN    tamsulosin (FLOMAX) capsule 0.8 mg, HS    Administrative Statements   Today, Patient Was Seen By: Aisha Bone MD  I have spent a total time of 53 minutes in caring for this patient on the day of the visit/encounter including Diagnostic results, Prognosis, Risks and benefits of tx options, Instructions for management, Patient and family education, Importance of tx  compliance, Risk factor reductions, Impressions, Counseling / Coordination of care, Documenting in the medical record, Reviewing / ordering tests, medicine, procedures  , Obtaining or reviewing history  , and Communicating with other healthcare professionals .    **Please Note: This note may have been constructed using a voice recognition system.**

## 2024-12-15 NOTE — ASSESSMENT & PLAN NOTE
SIRS criteria: Leukocytosis and tachycardia  Source of infection not determined until 2144 when UA resulted, antibiotics were started shortly afterwards  Does meet severe sepsis criteria with elevated T. bili  Procalcitonin 1.07->downward trending  blood cultures NGTD @ 24h

## 2024-12-15 NOTE — ASSESSMENT & PLAN NOTE
Wt Readings from Last 3 Encounters:   12/15/24 82.6 kg (182 lb)   06/01/24 84 kg (185 lb 3.2 oz)   05/29/24 83.9 kg (185 lb)   Last echo 6/2024 with LVEF of 35%  Not maintained on diuretic outpatient  Examines hypovolemic on admit  Will place on gentle IVF overnight in setting of acute infection and improvement in heart rate with fluids earlier  I/os and Daily weights

## 2024-12-15 NOTE — ASSESSMENT & PLAN NOTE
Anemia of ESRD  S/p pRBC    Presenting with chills and fatigue  UA with 4-10 WBCs and innumerable bacteria  Continue ceftriaxone day 2  urine culture negative

## 2024-12-15 NOTE — PLAN OF CARE

## 2024-12-16 LAB
ANION GAP SERPL CALCULATED.3IONS-SCNC: 9 MMOL/L (ref 4–13)
BACTERIA SPT RESP CULT: ABNORMAL
BUN SERPL-MCNC: 23 MG/DL (ref 5–25)
CALCIUM SERPL-MCNC: 8.2 MG/DL (ref 8.4–10.2)
CHLORIDE SERPL-SCNC: 104 MMOL/L (ref 96–108)
CO2 SERPL-SCNC: 22 MMOL/L (ref 21–32)
CREAT SERPL-MCNC: 1.06 MG/DL (ref 0.6–1.3)
GFR SERPL CREATININE-BSD FRML MDRD: 65 ML/MIN/1.73SQ M
GLUCOSE SERPL-MCNC: 110 MG/DL (ref 65–140)
GRAM STN SPEC: ABNORMAL
POTASSIUM SERPL-SCNC: 4.1 MMOL/L (ref 3.5–5.3)
SODIUM SERPL-SCNC: 135 MMOL/L (ref 135–147)

## 2024-12-16 PROCEDURE — 99239 HOSP IP/OBS DSCHRG MGMT >30: CPT | Performed by: STUDENT IN AN ORGANIZED HEALTH CARE EDUCATION/TRAINING PROGRAM

## 2024-12-16 PROCEDURE — 80048 BASIC METABOLIC PNL TOTAL CA: CPT | Performed by: STUDENT IN AN ORGANIZED HEALTH CARE EDUCATION/TRAINING PROGRAM

## 2024-12-16 PROCEDURE — 92610 EVALUATE SWALLOWING FUNCTION: CPT

## 2024-12-16 RX ORDER — METOPROLOL TARTRATE 75 MG/1
100 TABLET ORAL EVERY 12 HOURS SCHEDULED
Start: 2024-12-16 | End: 2025-01-15

## 2024-12-16 RX ORDER — CEFPODOXIME PROXETIL 200 MG/1
200 TABLET, FILM COATED ORAL 2 TIMES DAILY
Qty: 4 TABLET | Refills: 0 | Status: SHIPPED | OUTPATIENT
Start: 2024-12-16 | End: 2024-12-18

## 2024-12-16 RX ADMIN — GUAIFENESIN 600 MG: 600 TABLET ORAL at 08:45

## 2024-12-16 RX ADMIN — APIXABAN 5 MG: 5 TABLET, FILM COATED ORAL at 08:45

## 2024-12-16 RX ADMIN — OLODATEROL RESPIMAT INHALATION SPRAY 2 PUFF: 2.5 SPRAY, METERED RESPIRATORY (INHALATION) at 08:45

## 2024-12-16 RX ADMIN — METOPROLOL TARTRATE 100 MG: 50 TABLET, FILM COATED ORAL at 08:45

## 2024-12-16 RX ADMIN — UMECLIDINIUM 1 PUFF: 62.5 AEROSOL, POWDER ORAL at 08:45

## 2024-12-16 RX ADMIN — CHOLECALCIFEROL (VITAMIN D3) 10 MCG (400 UNIT) TABLET 400 UNITS: at 08:45

## 2024-12-16 RX ADMIN — PANTOPRAZOLE SODIUM 40 MG: 40 TABLET, DELAYED RELEASE ORAL at 05:38

## 2024-12-16 RX ADMIN — ASPIRIN 81 MG: 81 TABLET, COATED ORAL at 08:45

## 2024-12-16 RX ADMIN — DOXYCYCLINE 100 MG: 100 CAPSULE ORAL at 08:44

## 2024-12-16 RX ADMIN — FLUTICASONE PROPIONATE 1 SPRAY: 50 SPRAY, METERED NASAL at 08:45

## 2024-12-16 NOTE — ASSESSMENT & PLAN NOTE
CT A/P obtained on admit concerning for bilateral lower lobe pneumonia  Complete 5 day course of antibiotics  Urine strep and Legionella studies negative  Sputum culture showing mixed respiratory sherice

## 2024-12-16 NOTE — SPEECH THERAPY NOTE
Speech Language/Pathology  Speech-Language Pathology Bedside Swallow Evaluation      Patient Name: Leander Saab    Today's Date: 12/16/2024     Problem List  Principal Problem:    Cystitis  Active Problems:    PAF (paroxysmal atrial fibrillation) (HCC)    Hypertension    Chronic combined systolic and diastolic congestive heart failure (HCC)    CAP (community acquired pneumonia)    Severe sepsis (HCC)    Stage 3a chronic kidney disease (HCC)      Past Medical History  Past Medical History:   Diagnosis Date    Dysphagia     Hypertension        Past Surgical History  Past Surgical History:   Procedure Laterality Date    CHOLECYSTECTOMY LAPAROSCOPIC N/A 2/8/2021    Procedure: CHOLECYSTECTOMY LAPAROSCOPIC;  Surgeon: Wally Young MD;  Location:  MAIN OR;  Service: General    CORONARY STENT PLACEMENT         Summary   Pt presents w/ s/s suggested of functional oropharyngeal swallow skills.     Complete labial seal for retrieval and containment of all materials, no anterior bolus loss present. Min prolonged rotary mastication of regular textures, ultimately functional, w/ complete bolus breakdown and adequate bolus transfer. No oral residue noted. Suspected fairly prompt swallow initiation and fair hyolaryngeal elevation to palpation. No overt s/s of aspiration at this time, of note cannot r/o sensory deficit at bedside.    Education initiated w/ pt regarding strategies to optimize swallow safety including frequent/thorough oral care and to notify medical staff if s/s of aspiration arise. Pt verbalized understanding and agreement, denies questions or concerns at this time.     Pt w/ increased risk of aspiration 2/2 abn lung imaging and multiple medical co-morbidities. SLP to f/u for diet tolerance x1 w/ larger PO sample, if further concern for ongoing aspiration can consider completion of VFSS.       Recommended Diet: regular diet and thin liquids   Recommended Form of Meds: as tolerated    Aspiration precautions and  swallowing strategies: upright posture, only feed when fully alert, slow rate of feeding, and small bites/sips  Other Recommendations: Continue frequent oral care        Current Medical Status  Pt is a 79 y.o. male  with a PMH of systolic HF, HTN, PAF, and CKD stage III who presents with fatigue and abdominal pain since Wednesday.  Patient reports he had an EGD on Wednesday.  Since then he endorses left upper quadrant abdominal pain with associated anorexia.  Pain is worse with deep breaths and coughing.  No nausea or vomiting.  Normal bowel movements.  Reports he has intermittent chills since Wednesday as well.  Mild dyspnea.  No chest pain.  Reports nonproductive cough.  No fever.  Was in his normal state of health prior to Wednesday.  Denies any urinary symptoms.     Current Precautions:  Fall  Aspiration    Allergies:  No known food allergies    Past medical history:  Please see H&P for details    Special Studies:  12/13/24 CT abdomen pelvis wo contrast:  1.  Bilateral lower lobe opacities may be due to pneumonia versus aspiration in the appropriate clinical setting.  2.  Mildly nodular contour of the liver correlate clinically for cirrhosis.  3.  Diverticulosis without evidence of diverticulitis.    12/13/24 XR chest pa and lateral:  No acute cardiopulmonary disease.     History of VFSS:  N/A     Social/Education/Vocational Hx:  Pt lives w/ spouse    Swallow Information   Current Diet: regular diet and thin liquids   Baseline Diet: regular diet and thin liquids per pt report       Baseline Assessment   Behavior/Cognition: alert  Speech/Language Status: able to participate in conversation and able to follow commands  Patient Positioning: upright at edge of bed   Pain Status/Interventions/Response to Interventions: No report of or nonverbal indications of pain.       Oral Mechanism Exam  Facial: symmetrical  Labial: WFL  Lingual: WFL  Velum: symmetrical  Mandible: adequate ROM  Dentition: edentulous- pt reports  he no longer wears dentures   Vocal quality:clear/adequate   Volitional Cough: strong/productive   Respiratory Status: on RA       Consistencies Assessed and Performance   Consistencies Administered: thin liquids and hard solids    Oral Stage:   Complete labial seal for retrieval and containment of all materials, no anterior bolus loss present. Min prolonged rotary mastication of regular textures, ultimately functional, w/ complete bolus breakdown and adequate bolus transfer. No oral residue noted.     Pharyngeal Stage:   Suspected fairly prompt swallow initiation and fair hyolaryngeal elevation to palpation. No overt s/s of aspiration at this time, of note cannot r/o sensory deficit at bedside.    Esophageal Concerns: h/o esophageal stricture w/ dilation     Strategies and Efficacy: alternate solids/liquids, small bites/sips    Summary and Recommendations (see above)    Results Reviewed with: patient, RN, and MD     Treatment Recommended: as able and appropriate for diet tolerance, will monitor need to complete VFSS     Dysphagia LTG  -Patient will demonstrate safe and effective oral intake (without overt s/s significant oral/pharyngeal dysphagia including s/s penetration or aspiration) for the highest appropriate diet level.     Short Term Goals:  -Pt will tolerate regular textures and thin liquid with no significant s/s oral or pharyngeal dysphagia across 1-3 diagnostic session/s    -Patient will comply with a Video/Modified Barium Swallow study for more complete assessment of swallowing anatomy/physiology/aspiration risk and to assess efficacy of treatment techniques so as to best guide treatment plan if medically indicated     Speech Therapy Prognosis   Prognosis: good    Prognosis Considerations: age, medical status, and prior medical history

## 2024-12-16 NOTE — ASSESSMENT & PLAN NOTE
Lab Results   Component Value Date    EGFR 65 12/16/2024    EGFR 59 12/15/2024    EGFR 48 12/14/2024    CREATININE 1.06 12/16/2024    CREATININE 1.14 12/15/2024    CREATININE 1.35 (H) 12/14/2024     Stable, did not meet MIRNA criteria. Stable.

## 2024-12-16 NOTE — ASSESSMENT & PLAN NOTE
Controlled  Continue Home regimen: Metoprolol succinate 100 Mg twice daily   Finasteride Pregnancy And Lactation Text: This medication is absolutely contraindicated during pregnancy. It is unknown if it is excreted in breast milk.

## 2024-12-16 NOTE — ASSESSMENT & PLAN NOTE
Wt Readings from Last 3 Encounters:   12/16/24 83.1 kg (183 lb 3.2 oz)   06/01/24 84 kg (185 lb 3.2 oz)   05/29/24 83.9 kg (185 lb)     Echo 6/2024 showing EF of 35%.   Euvolemic

## 2024-12-16 NOTE — ASSESSMENT & PLAN NOTE
79 year old male presented with chills and fatigue. Initial concern for UTI, but urine cultures continue  negative.

## 2024-12-16 NOTE — PLAN OF CARE

## 2024-12-16 NOTE — CASE MANAGEMENT
Case Management Discharge Planning Note    Patient name Leander Saab  Location /-01 MRN [id number]  : 1945 Date 2024       Current Admission Date: 2024  Current Admission Diagnosis:Cystitis   Patient Active Problem List    Diagnosis Date Noted Date Diagnosed    Cystitis 2024     CAP (community acquired pneumonia) 2024     Severe sepsis (HCC) 2024     Stage 3a chronic kidney disease (HCC) 2024     Abnormal CT of the abdomen 2024     Atrial fibrillation with rapid ventricular response (HCC) 10/12/2023     GERD (gastroesophageal reflux disease) 10/12/2023     Chronic combined systolic and diastolic congestive heart failure (HCC) 2021     Gallbladder disease      Dysphagia 2021     Gram-negative bacteremia 2021     Acute cholecystitis 2021     PAF (paroxysmal atrial fibrillation) (HCC) 2021     Hypertension 2021       LOS (days): 3  Geometric Mean LOS (GMLOS) (days):   Days to GMLOS:     OBJECTIVE:  Risk of Unplanned Readmission Score: 15.9         Current admission status: Inpatient   Preferred Pharmacy:   [facility] [address] [facility]  [place] [address]  Phone: [phone] Fax: [phone]    [facility] [address] [address]  Phone: [phone] Fax: [phone]    Primary Care Provider: Ashley Gibson MD    [facility]  Secondary Insurance: MEDICARE    DISCHARGE DETAILS:    IMM Given (Date):: 24  IMM Given to:: Patient

## 2024-12-16 NOTE — DISCHARGE SUMMARY
Discharge Summary - Hospitalist   Name: Leander Saab 79 y.o. male I MRN: [id number]  Unit/Bed#: -01 I Date of Admission: 12/13/2024   Date of Service: 12/16/2024 I Hospital Day: 3     Assessment & Plan  Cystitis  79 year old male presented with chills and fatigue. Initial concern for UTI, but urine cultures continue  negative.   CAP (community acquired pneumonia)  CT A/P obtained on admit concerning for bilateral lower lobe pneumonia  Complete 5 day course of antibiotics  Urine strep and Legionella studies negative  Sputum culture showing mixed respiratory sherice  Severe sepsis (HCC)  Sepsis possibly due to pneumonia. Cultures continue to remain negative.  Stage 3a chronic kidney disease (HCC)  Lab Results   Component Value Date    EGFR 65 12/16/2024    EGFR 59 12/15/2024    EGFR 48 12/14/2024    CREATININE 1.06 12/16/2024    CREATININE 1.14 12/15/2024    CREATININE 1.35 (H) 12/14/2024     Stable, did not meet MIRNA criteria. Stable.   PAF (paroxysmal atrial fibrillation) (HCC)  RC: Metoprolol succinate 100 Mg twice daily  AC: Eliquis 5 Mg twice daily  Hypertension  Controlled  Continue Home regimen: Metoprolol succinate 100 Mg twice daily  Chronic combined systolic and diastolic congestive heart failure (HCC)  Wt Readings from Last 3 Encounters:   12/16/24 83.1 kg (183 lb 3.2 oz)   06/01/24 84 kg (185 lb 3.2 oz)   05/29/24 83.9 kg (185 lb)     Echo 6/2024 showing EF of 35%.   Euvolemic     Discharging Physician / Practitioner: Wally Gautam MD  PCP: Ashley Gibson MD  Admission Date:   Admission Orders (From admission, onward)       Ordered        12/13/24 2301  INPATIENT ADMISSION  Once            12/13/24 1915  Place in Observation  Once                          Discharge Date: 12/16/24    Medical Problems       Resolved Problems  Date Reviewed: 10/14/2023   None         Consultations During Hospital Stay:  no    Procedures Performed:   no    Significant Findings / Test Results:   Imaging  XR  "Chest pa lateral:     No acute cardiopulmonary disease.    Ct abdomen pelvis:    1.  Bilateral lower lobe opacities may be due to pneumonia versus aspiration in the appropriate clinical setting.  2.  Mildly nodular contour of the liver correlate clinically for cirrhosis.  3.  Diverticulosis without evidence of diverticulitis.      Test Results Pending at Discharge (will require follow up):   cultures     Outpatient Tests Requested:  PCP  CBC, BMP    Complications:  none    Reason for Admission: sepsis pneumonia    Hospital Course:     Leander Saab is a 79 y.o. male patient who originally presented to the hospital on 12/13/2024 due to shortness of breath    Patient is an 79 year old male with a history of CHF, hypertension, PAF, and CKD III who presented with fatigue, abdominal pain, and shortness of breath. Imaging findings showed bilateral lower lobe opacities which may be due to pneumonia. He continued to show clinical improvement with antibiotics. Initially, there was concern for cystitis, but urine cultures was no growth. Sputum cultures showing mixed respiratory sherice. Patient reportedly is feeling much better and is looking forward to his discharge today.     Patient agrees to follow-up with his providers as scheduled and to take his medications as prescribed. All questions were addressed.    he understood the need to seek immediate medical attention should he develop any chest pain, shortness of breath, severe pain, fever, chills, or any other concerning symptoms.    Please see above list of diagnoses and related plan for additional information.     Condition at Discharge: good     Discharge Day Visit / Exam:     Subjective:  patient seen and examined at bedside. No new issues overnight.   Vitals: Blood Pressure: 132/70 (12/16/24 0754)  Pulse: 80 (12/16/24 0754)  Temperature: 98 °F (36.7 °C) (12/16/24 0754)  Temp Source: Oral (12/16/24 0754)  Respirations: 18 (12/16/24 0754)  Height: 5' 7\" (170.2 cm) " (12/13/24 2025)  Weight - Scale: 83.1 kg (183 lb 3.2 oz) (12/16/24 0541)  SpO2: 95 % (12/16/24 0800)  Exam:   Physical Exam  Vitals reviewed.   Constitutional:       General: He is not in acute distress.  HENT:      Head: Normocephalic.      Nose: Nose normal.      Mouth/Throat:      Mouth: Mucous membranes are moist.   Eyes:      General: No scleral icterus.  Cardiovascular:      Rate and Rhythm: Normal rate and regular rhythm.   Pulmonary:      Effort: Pulmonary effort is normal. No respiratory distress.      Breath sounds: No wheezing or rales.   Abdominal:      General: There is no distension.      Palpations: Abdomen is soft.      Tenderness: There is no abdominal tenderness.   Skin:     General: Skin is warm.   Neurological:      Mental Status: He is alert.   Psychiatric:         Mood and Affect: Mood normal.         Behavior: Behavior normal.       Discharge instructions/Information to patient and family:   See after visit summary for information provided to patient and family.      Provisions for Follow-Up Care:  See after visit summary for information related to follow-up care and any pertinent home health orders.      Disposition:     Home    Planned Readmission: No     Discharge Statement:  I spent 40 minutes discharging the patient. This time was spent on the day of discharge. I had direct contact with the patient on the day of discharge. Greater than 50% of the total time was spent examining patient, answering all patient questions, arranging and discussing plan of care with patient as well as directly providing post-discharge instructions.  Additional time then spent on discharge activities.    Discharge Medications:  See after visit summary for reconciled discharge medications provided to patient and family.      ** Please Note: This note has been constructed using a voice recognition system **

## 2024-12-16 NOTE — CASE MANAGEMENT
Case Management Assessment & Discharge Planning Note    Patient name Leander Saab  Location /-01 MRN [id number]  : 1945 Date 2024       Current Admission Date: 2024  Current Admission Diagnosis:Cystitis   Patient Active Problem List    Diagnosis Date Noted Date Diagnosed    Cystitis 2024     CAP (community acquired pneumonia) 2024     Severe sepsis (HCC) 2024     Stage 3a chronic kidney disease (HCC) 2024     Abnormal CT of the abdomen 2024     Atrial fibrillation with rapid ventricular response (HCC) 10/12/2023     GERD (gastroesophageal reflux disease) 10/12/2023     Chronic combined systolic and diastolic congestive heart failure (HCC) 2021     Gallbladder disease      Dysphagia 2021     Gram-negative bacteremia 2021     Acute cholecystitis 2021     PAF (paroxysmal atrial fibrillation) (HCC) 2021     Hypertension 2021       LOS (days): 3  Geometric Mean LOS (GMLOS) (days):   Days to GMLOS:     OBJECTIVE:    Risk of Unplanned Readmission Score: 15.9         Current admission status: Inpatient       Preferred Pharmacy:   [address] [address]  Phone: [phone] Fax: [phone]    [facility] [address] [address]  Phone: [phone] Fax: [phone]    Primary Care Provider: Ashley Gibson MD    Primary Insurance: [facility]  Secondary Insurance: MEDICARE    ASSESSMENT:  Active Health Care Proxies    There are no active Health Care Proxies on file.       Advance Directives  Does patient have a Health Care POA?: No  Was patient offered paperwork?: Yes (Declined- has paperwork)  Does patient currently have a Health Care decision maker?: No  Does patient have Advance Directives?: No  Was patient offered paperwork?: Yes (Declined-has paperwork)  Primary Contact:  Chiquis Gillette: girlfriend: [phone]    Readmission Root Cause  30 Day Readmission: No    Patient Information  Admitted from:: Home  Mental Status: Alert  During Assessment patient was accompanied by: Not accompanied during assessment  Assessment information provided by:: Patient  Primary Caregiver: Self  Support Systems: Self, Spouse/significant other  County of Residence: [place]  What city do you live in?: [place]  Home entry access options. Select all that apply.: Stairs  Number of steps to enter home.: 1  Do the steps have railings?: No  Type of Current Residence: 2 story home  Upon entering residence, is there a bedroom on the main floor (no further steps)?: No  A bedroom is located on the following floor levels of residence (select all that apply):: 2nd Floor  Upon entering residence, is there a bathroom on the main floor (no further steps)?: Yes  Number of steps to 2nd floor from main floor: One Flight  Living Arrangements: Lives w/ Spouse/significant other  Is patient a ?: Yes    Activities of Daily Living Prior to Admission  Functional Status: Independent  Completes ADLs independently?: Yes  Ambulates independently?: Yes  Does patient use assisted devices?: No  Does patient currently own DME?: Yes  What DME does the patient currently own?: Crutches, Walker  Does patient have a history of Outpatient Therapy (PT/OT)?: Yes  Does the patient have a history of Short-Term Rehab?: No  Does patient have a history of HHC?: Yes (unable to recall agency)  Does patient currently have HHC?: No    Patient Information Continued  Income Source: Pension/shelter  Does patient have prescription coverage?: Yes  Does patient receive dialysis treatments?: No  Does patient have a history of substance abuse?: No  Does patient have a history of Mental Health Diagnosis?: No    Means of Transportation  Means of Transport to Appts:: Drives Self    DISCHARGE DETAILS:    Discharge planning discussed with::  patient  Freedom of Choice: Yes  Comments - Freedom of Choice: Plans to return home, declines discharge needs    IMM Given (Date):: 12/16/24  IMM Given to:: Patient     Additional Comments: Met with Pt. Pt presents AA&Ox3. Discussed role of case management. Pt lives with SO in [address], [address] back door. Independent PTA. Has crutches, walker but does not use. [facility] to [facility]. Drives. Hx of outpt PT/OT and VNA. Denies hx of SNF/MH/D&A. Pt plans to return home and declines discharge needs. Pt's SO to transport home.

## 2024-12-17 VITALS
DIASTOLIC BLOOD PRESSURE: 57 MMHG | HEIGHT: 67 IN | BODY MASS INDEX: 28.75 KG/M2 | OXYGEN SATURATION: 95 % | SYSTOLIC BLOOD PRESSURE: 91 MMHG | HEART RATE: 75 BPM | RESPIRATION RATE: 18 BRPM | WEIGHT: 183.2 LBS | TEMPERATURE: 97.7 F

## 2024-12-18 LAB
BACTERIA BLD CULT: NORMAL
BACTERIA BLD CULT: NORMAL

## (undated) DEVICE — IRRIG ENDO FLO TUBING

## (undated) DEVICE — GLOVE SRG BIOGEL ORTHOPEDIC 7.5

## (undated) DEVICE — LIGAMAX 5 MM ENDOSCOPIC MULTIPLE CLIP APPLIER: Brand: LIGAMAX

## (undated) DEVICE — TROCAR: Brand: KII FIOS FIRST ENTRY

## (undated) DEVICE — NEEDLE HYPO 22G X 1-1/2 IN

## (undated) DEVICE — ELECTRODE LAP SPATULA CRV E-Z CLEAN 33CM -0018C

## (undated) DEVICE — PADS GROUND

## (undated) DEVICE — PENCIL ELECTROSURG E-Z CLEAN -0035H

## (undated) DEVICE — ADHESIVE SKIN HIGH VISCOSITY EXOFIN 1ML

## (undated) DEVICE — INTENDED FOR TISSUE SEPARATION, AND OTHER PROCEDURES THAT REQUIRE A SHARP SURGICAL BLADE TO PUNCTURE OR CUT.: Brand: BARD-PARKER SAFETY BLADES SIZE 11, STERILE

## (undated) DEVICE — TUBING SMOKE EVAC W/FILTRATION DEVICE PLUMEPORT

## (undated) DEVICE — SUT MONOCRYL 4-0 PS-2 27 IN Y426H

## (undated) DEVICE — PMI DISPOSABLE PUNCTURE CLOSURE DEVICE / SUTURE GRASPER: Brand: PMI

## (undated) DEVICE — TROCAR: Brand: KII® SLEEVE

## (undated) DEVICE — ENDOPATH PNEUMONEEDLE INSUFFLATION NEEDLES WITH LUER LOCK CONNECTORS 120MM: Brand: ENDOPATH

## (undated) DEVICE — SYRINGE 50ML LL

## (undated) DEVICE — HARMONIC ACE 5MM DIAMETER SHEARS 36CM SHAFT LENGTH + ADAPTIVE TISSUE TECHNOLOGY FOR USE WITH GENERATOR G11: Brand: HARMONIC ACE

## (undated) DEVICE — ALLENTOWN LAP CHOLE APP PACK: Brand: CARDINAL HEALTH

## (undated) DEVICE — ENDOPATH 5MM CURVED SCISSORS WITH MONOPOLAR CAUTERY: Brand: ENDOPATH

## (undated) DEVICE — SUT VICRYL 0 UR-6 27 IN J603H

## (undated) DEVICE — POOLE SUCTION HANDLE W/TUBING: Brand: CARDINAL HEALTH

## (undated) DEVICE — CHLORAPREP HI-LITE 26ML ORANGE

## (undated) DEVICE — Device: Brand: TISSUE RETRIEVAL SYSTEM